# Patient Record
Sex: FEMALE | Race: WHITE | NOT HISPANIC OR LATINO | Employment: FULL TIME | URBAN - METROPOLITAN AREA
[De-identification: names, ages, dates, MRNs, and addresses within clinical notes are randomized per-mention and may not be internally consistent; named-entity substitution may affect disease eponyms.]

---

## 2017-01-09 ENCOUNTER — HOSPITAL ENCOUNTER (OUTPATIENT)
Dept: ORTHOPEDIC SURGERY | Facility: CLINIC | Age: 37
Discharge: HOME | End: 2017-01-09
Attending: ORTHOPAEDIC SURGERY | Admitting: ORTHOPAEDIC SURGERY

## 2020-03-10 ENCOUNTER — OFFICE VISIT (OUTPATIENT)
Dept: ORTHOPEDIC SURGERY | Facility: CLINIC | Age: 40
End: 2020-03-10

## 2020-03-10 ENCOUNTER — HOSPITAL ENCOUNTER (OUTPATIENT)
Dept: CT IMAGING | Facility: HOSPITAL | Age: 40
Discharge: HOME | End: 2020-03-10
Admitting: FAMILY MEDICINE

## 2020-03-10 VITALS
HEIGHT: 62 IN | DIASTOLIC BLOOD PRESSURE: 76 MMHG | SYSTOLIC BLOOD PRESSURE: 117 MMHG | BODY MASS INDEX: 25.4 KG/M2 | WEIGHT: 138 LBS | HEART RATE: 105 BPM

## 2020-03-10 DIAGNOSIS — R47.81 SLURRED SPEECH: ICD-10-CM

## 2020-03-10 DIAGNOSIS — M25.511 ACUTE PAIN OF RIGHT SHOULDER: ICD-10-CM

## 2020-03-10 DIAGNOSIS — M75.51 SUBACROMIAL BURSITIS OF RIGHT SHOULDER JOINT: ICD-10-CM

## 2020-03-10 DIAGNOSIS — M75.81 TENDINITIS OF RIGHT ROTATOR CUFF: Primary | ICD-10-CM

## 2020-03-10 PROBLEM — M25.512 PAIN IN JOINT OF LEFT SHOULDER: Status: ACTIVE | Noted: 2017-01-09

## 2020-03-10 PROBLEM — M75.52 BURSITIS OF LEFT SHOULDER: Status: ACTIVE | Noted: 2017-01-09

## 2020-03-10 PROCEDURE — 70450 CT HEAD/BRAIN W/O DYE: CPT

## 2020-03-10 PROCEDURE — 99203 OFFICE O/P NEW LOW 30 MIN: CPT | Performed by: FAMILY MEDICINE

## 2020-03-10 RX ORDER — LEVONORGESTREL AND ETHINYL ESTRADIOL 0.1-0.02MG
1 KIT ORAL DAILY
COMMUNITY
Start: 2020-02-02

## 2020-03-10 RX ORDER — IBUPROFEN 200 MG
200 TABLET ORAL EVERY 6 HOURS PRN
COMMUNITY
End: 2020-07-14 | Stop reason: HOSPADM

## 2020-03-10 NOTE — PROGRESS NOTES
"Primary Care Sports Medicine Office Visit Note     Patient ID: Rashmi Diana is a 39 y.o. female.    Chief Complaint:  Chief Complaint   Patient presents with   • Right Shoulder - Initial Evaluation     HPI:    Ms. Rashmi Diana is a 39 y.o. female who presents to the clinic today for R shoulder pain. 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 Daily chronic achy pain. Worse with activity, worse overhead, difficulty brushing hair. Has to lift R arm with the L overhead.     She also states that last night while laying in the bathtub, she felt a moderate amount pain, and arm was comfortably laying next to her. She then felt tingling and numbness into the hand and fingers, whole hand. Speaking at that time became \"jumbled\", slurred. She had a moderate HA. Blurry VA. Had recently eaten. Sitting in warm bath. No other symptoms recently, no illness.     Past Medical History:   Diagnosis Date   • Headache    • Neck pain    • Right shoulder pain        Past Surgical History:   Procedure Laterality Date   • TONSILLECTOMY AND ADENOIDECTOMY     • WISDOM TOOTH EXTRACTION         Family History   Problem Relation Age of Onset   • Cancer Mother    • Cancer Paternal Grandmother    • Diabetes Paternal Grandfather      Social History     Occupational History   • Not on file   Tobacco Use   • Smoking status: Never Smoker   • Smokeless tobacco: Never Used   Substance and Sexual Activity   • Alcohol use: Yes     Comment: Social   • Drug use: Not on file   • Sexual activity: Not on file      Review of Systems   Constitutional: Negative for activity change and fever.   Respiratory: Negative for cough and shortness of breath.    Cardiovascular: Negative for chest pain.   Gastrointestinal: Negative for constipation, diarrhea, nausea and vomiting.   Musculoskeletal: Positive for arthralgias.   Skin: Negative for color change and rash.   Neurological: " "Negative for weakness.   Hematological: Does not bruise/bleed easily.       Objective:    /76 (BP Location: Left arm, Patient Position: Sitting, Cuff Size: Adult)   Pulse 105   Ht 157.5 cm (62\")   Wt 62.6 kg (138 lb)   BMI 25.24 kg/m²     Physical Examination:  Physical Exam   Constitutional: She appears well-developed and well-nourished. No distress.   HENT:   Head: Normocephalic and atraumatic.   Eyes: Conjunctivae are normal.   Cardiovascular: Intact distal pulses.   Pulmonary/Chest: Effort normal. No respiratory distress.   Neurological: She is alert.   Skin: Skin is warm. Capillary refill takes less than 2 seconds. She is not diaphoretic.   Nursing note and vitals reviewed.    Right Shoulder Exam     Range of Motion   Active abduction: 120   Passive abduction: normal   Extension: normal   External rotation: normal   Forward flexion: 120   Internal rotation 0 degrees: normal     Muscle Strength   Abduction: 5/5   External rotation: 4/5   Supraspinatus: 4/5   Subscapularis: 5/5   Biceps: 5/5     Tests   Beck test: positive  Impingement: positive  Drop arm: negative  Sulcus: absent    Other   Erythema: absent  Sensation: normal  Pulse: present    Comments:  Mildly positive Tonja for pain, positive resisted external rotation for pain as well, negative liftoff.  Negative [place]'s, negative scarf.  Positive Neer.  Negative speeds/Yergason's.      Cervical range of motion is full with no tenderness to palpation to the bony midline or paraspinal cervical musculature.  Spurling's maneuver to the right is negative.          Imaging and other tests:  3V XR R shoulder today yields no obvious fracture, malalignment or dislocation. Essentially negative XR R shoulder.     Assessment and Plan:    1. Acute pain of right shoulder  - XR Shoulder 2+ View Right    2. Subacromial bursitis of right shoulder joint    3. Tendinitis of right rotator cuff  - Ambulatory Referral to Physical Therapy Evaluate and treat; " "Stretching, ROM, Strengthening    4. Slurred speech  - CT Head Without Contrast; Future  - Ambulatory Referral to Neurology    I discussed with this patient today that though her shoulder pain is bothering her, she has some very concerning symptoms for TIA/CVA.  Though this is unlikely in a relatively healthy 39-year-old, we will advance to emergent CT head to rule out hemorrhagic process.  The patient has recently started oral contraceptives, but she does not smoke.  She was sent directly to the hospital across the street for immediate testing.  I received a phone call at around 330 that afternoon from the radiology department, notifying me of radiologist read of negative CT head.  I then discussed this finding with the patient over the phone, who was relieved.  I would still however like for her to see a neurologist for this episode, as this sounds to be a transient ischemic attack, and needs further evaluation.    Otherwise pertaining to her shoulder, I recommended we initiate physical therapy for stretching and strengthening of the rotator cuff.  It seems that her supraspinatus is bothering her the most.  She will attempt physical therapy and return to this office status post physical therapy, in 3 months for further evaluation related to shoulder pain.    Narayan DUGGAN \"Chance\" Douglas LEDEZMA DO, CAQSM  03/11/20  17:32    Disclaimer: Please note that areas of this note were completed with computer voice recognition software.  Quite often unanticipated grammatical, syntax, homophones, and other interpretive errors are inadvertently transcribed by the computer software. Please excuse any errors that have escaped final proofreading.  "

## 2020-03-20 ENCOUNTER — OFFICE VISIT (OUTPATIENT)
Dept: ORTHOPEDIC SURGERY | Facility: CLINIC | Age: 40
End: 2020-03-20

## 2020-03-20 VITALS
SYSTOLIC BLOOD PRESSURE: 114 MMHG | HEART RATE: 103 BPM | DIASTOLIC BLOOD PRESSURE: 78 MMHG | BODY MASS INDEX: 24.45 KG/M2 | HEIGHT: 63 IN | WEIGHT: 138 LBS | TEMPERATURE: 98.6 F

## 2020-03-20 DIAGNOSIS — M75.51 SUBACROMIAL BURSITIS OF RIGHT SHOULDER JOINT: Primary | ICD-10-CM

## 2020-03-20 DIAGNOSIS — M20.011 MALLET FINGER OF RIGHT HAND: ICD-10-CM

## 2020-03-20 DIAGNOSIS — M75.81 TENDINITIS OF RIGHT ROTATOR CUFF: ICD-10-CM

## 2020-03-20 PROCEDURE — 20610 DRAIN/INJ JOINT/BURSA W/O US: CPT | Performed by: FAMILY MEDICINE

## 2020-03-20 PROCEDURE — 99214 OFFICE O/P EST MOD 30 MIN: CPT | Performed by: FAMILY MEDICINE

## 2020-03-20 RX ORDER — TRIAMCINOLONE ACETONIDE 40 MG/ML
80 INJECTION, SUSPENSION INTRA-ARTICULAR; INTRAMUSCULAR
Status: COMPLETED | OUTPATIENT
Start: 2020-03-20 | End: 2020-03-20

## 2020-03-20 RX ADMIN — TRIAMCINOLONE ACETONIDE 80 MG: 40 INJECTION, SUSPENSION INTRA-ARTICULAR; INTRAMUSCULAR at 13:26

## 2020-03-20 NOTE — PROGRESS NOTES
Procedure   Large Joint Arthrocentesis: R subacromial bursa  Date/Time: 3/20/2020 1:26 PM  Consent given by: patient  Timeout: Immediately prior to procedure a time out was called to verify the correct patient, procedure, equipment, support staff and site/side marked as required   Supporting Documentation  Indications: pain   Procedure Details  Location: shoulder - R subacromial bursa  Preparation: Patient was prepped and draped in the usual sterile fashion  Needle size: 22 G  Approach: posterior  Medications administered: 80 mg triamcinolone acetonide 40 MG/ML (6cc of 1% lidocaine without epinepherine and 2cc of 40mg Kenalog)  Patient tolerance: patient tolerated the procedure well with no immediate complications (Blood loss negligable, pt admits to almost immediate pain reflief post injection with gentle ROM.)

## 2020-03-20 NOTE — PROGRESS NOTES
Primary Care Sports Medicine Office Visit Note     Patient ID: Rashmi Diana is a 39 y.o. female.    Chief Complaint:  Chief Complaint   Patient presents with   • Right Shoulder - Follow-up, Pain     HPI:    Ms. Rashmi Diana is a 39 y.o. female who returns to the clinic today for follow-up of right shoulder pain.  The patient was previously seen and evaluated for right shoulder pain, but unfortunately had much more pressing issues at her last visit.  There was concern for TIA versus stroke, and she was sent from our office directly for CT of the brain.  This work-up was essentially negative, she returns to discuss her shoulder today.  Her shoulder pain persists, continues to wake her from sleep, points to the posterior aspect of the shoulder.  She denies any weakness, numbness, or tingling of the shoulder, but does have a moderate amount of achiness, and occasional sharp stabbing pain with overhead motion or use.    Lastly, she also states she is having difficulty after an injury of her left third finger.  She states she was lifting a bag out of the car, and jammed a straight extended finger into the back of a car seat.  This caused a forceful flexion of the DIP, that was extremely painful.  She now cannot extend this digit at the DIP joint.    Past Medical History:   Diagnosis Date   • Headache    • Neck pain    • Right shoulder pain    • Rotator cuff injury        Past Surgical History:   Procedure Laterality Date   • TONSILLECTOMY AND ADENOIDECTOMY     • WISDOM TOOTH EXTRACTION         Family History   Problem Relation Age of Onset   • Cancer Mother    • Cancer Paternal Grandmother    • Diabetes Paternal Grandfather      Social History     Occupational History   • Not on file   Tobacco Use   • Smoking status: Never Smoker   • Smokeless tobacco: Never Used   • Tobacco comment: On occassion vapes with CBD oil/ No passive exposure   Substance and Sexual Activity   • Alcohol use: Yes     Comment: Social   •  "Drug use: Never   • Sexual activity: Defer      Review of Systems   Constitutional: Negative for activity change and fever.   Respiratory: Negative for cough and shortness of breath.    Cardiovascular: Negative for chest pain.   Gastrointestinal: Negative for constipation, diarrhea, nausea and vomiting.   Musculoskeletal: Positive for arthralgias.   Skin: Negative for color change and rash.   Neurological: Negative for weakness.   Hematological: Does not bruise/bleed easily.     Objective:    /78   Pulse 103   Temp 98.6 °F (37 °C) (Oral)   Ht 160 cm (63\")   Wt 62.6 kg (138 lb)   LMP 02/25/2020   BMI 24.45 kg/m²     Physical Examination:  Physical Exam   Constitutional: She appears well-developed and well-nourished. No distress.   HENT:   Head: Normocephalic and atraumatic.   Eyes: Conjunctivae are normal.   Cardiovascular: Intact distal pulses.   Pulmonary/Chest: Effort normal. No respiratory distress.   Neurological: She is alert.   Skin: Skin is warm. Capillary refill takes less than 2 seconds. She is not diaphoretic.   Nursing note and vitals reviewed.    Left Hand Exam     Range of Motion   Wrist   Extension: normal   Flexion: normal   Pronation: normal   Supination: normal   Hand   MP Middle: normal   PIP Middle: normal   DIP Middle: normal     Muscle Strength   Wrist extension: 5/5   Wrist flexion: 5/5   :  5/5     Other   Erythema: absent  Sensation: normal    Comments:  Full passive ROM of 3rd DIP, but 0/5 strength of DIP extension      Right Shoulder Exam     Range of Motion   Active abduction: normal   Passive abduction: normal   Extension: normal   External rotation: normal   Forward flexion: normal   Internal rotation 0 degrees: normal     Muscle Strength   Abduction: 5/5   External rotation: 5/5   Supraspinatus: 5/5   Subscapularis: 5/5   Biceps: 5/5     Tests   Beck test: positive  Impingement: positive  Drop arm: negative  Sulcus: absent    Other   Erythema: absent  Sensation: " "normal  Pulse: present    Comments:  Mildly positive Tonja for pain, positive resisted external rotation for pain as well, negative liftoff.  Negative [place]'s, negative scarf.  Positive Neer.  Negative speeds/Yergason's.      Cervical range of motion is full with no tenderness to palpation to the bony midline or paraspinal cervical musculature.  Spurling's maneuver to the right is negative.            Imaging and other tests:  Three-view XR of the right hand shows no obvious fracture, malalignment, or dislocation.    Assessment and Plan:    1. Subacromial bursitis of right shoulder joint    2. Tendinitis of right rotator cuff    3. Extensor Tendon rupture, mallet finger, of right hand    After discussion of risks and benefits, the patient elected to proceed with corticosteroid injection to the R subacromial bursa.  The patient tolerated this procedure well without any complaints or problems.  I recommended continuation of conservative management as previous, RTC in 3-6 months or sooner if symptoms recur.    As far as the finger injury, the patient exhibits extensor tendon rupture.  I would like for her to be splinted in mild hyperextension for the next 6 weeks continuously.  I stressed the importance of no flexion activity at all.  Patient verbalized understanding.  She was placed in a Stax splint today.  RTC in 4 weeks.    Narayan DUGGAN \"Danilo\" Douglas LEDEZMA DO, CAQSM  03/20/20  12:29    Disclaimer: Please note that areas of this note were completed with computer voice recognition software.  Quite often unanticipated grammatical, syntax, homophones, and other interpretive errors are inadvertently transcribed by the computer software. Please excuse any errors that have escaped final proofreading.  "

## 2020-06-09 ENCOUNTER — OFFICE VISIT (OUTPATIENT)
Dept: ORTHOPEDIC SURGERY | Facility: CLINIC | Age: 40
End: 2020-06-09

## 2020-06-09 VITALS
TEMPERATURE: 98 F | HEART RATE: 88 BPM | WEIGHT: 143 LBS | SYSTOLIC BLOOD PRESSURE: 113 MMHG | DIASTOLIC BLOOD PRESSURE: 75 MMHG | BODY MASS INDEX: 25.34 KG/M2 | HEIGHT: 63 IN

## 2020-06-09 DIAGNOSIS — M75.51 SUBACROMIAL BURSITIS OF RIGHT SHOULDER JOINT: ICD-10-CM

## 2020-06-09 DIAGNOSIS — S46.009A ROTATOR CUFF INJURY, INITIAL ENCOUNTER: Primary | ICD-10-CM

## 2020-06-09 PROCEDURE — 99214 OFFICE O/P EST MOD 30 MIN: CPT | Performed by: FAMILY MEDICINE

## 2020-06-09 RX ORDER — IBUPROFEN AND FAMOTIDINE 26.6; 8 MG/1; MG/1
1 TABLET, FILM COATED ORAL 2 TIMES DAILY
Qty: 90 TABLET | Refills: 3 | Status: SHIPPED | OUTPATIENT
Start: 2020-06-09 | End: 2020-06-10 | Stop reason: SDUPTHER

## 2020-06-09 NOTE — PROGRESS NOTES
Primary Care Sports Medicine Office Visit Note     Patient ID: Rashmi Diana is a 40 y.o. female.    Chief Complaint:  Chief Complaint   Patient presents with   • Right Shoulder - Pain     HPI:    Ms. Rashmi Diana is a 40 y.o. female who presents to the clinic today for follow up of R shoulder pain. She states that initially since last visit and corticosteroid injection on 3/10/2020, she had a significant amount of relief. Unfortunately due to pandemic, PT was closed and she was unable to go. She has had return of pain, about one month ago now. Worse than before. She is again having difficultly with overhead use, combing/washing hair. Pain now radiating to th lateral and anterior arm, and down into the elbow. Taking IBU, 800mg nightly for pain, but unfortunately continues to have pain and awakens at night. Requests repeat injection.     Lastly, pt states she also had another fall since last visit. She was walking in the dark carrying a cat, and fell forward on an outstretched R arm. Pain in the shoulder significantly worsened.     Past Medical History:   Diagnosis Date   • Headache    • Neck pain    • Right shoulder pain    • Rotator cuff injury        Past Surgical History:   Procedure Laterality Date   • TONSILLECTOMY AND ADENOIDECTOMY     • WISDOM TOOTH EXTRACTION         Family History   Problem Relation Age of Onset   • Cancer Mother    • Cancer Paternal Grandmother    • Diabetes Paternal Grandfather      Social History     Occupational History   • Not on file   Tobacco Use   • Smoking status: Never Smoker   • Smokeless tobacco: Never Used   • Tobacco comment: On occassion vapes with CBD oil/ No passive exposure   Substance and Sexual Activity   • Alcohol use: Yes     Comment: Social   • Drug use: Never   • Sexual activity: Defer      Review of Systems   Constitutional: Negative for activity change and fever.   Musculoskeletal: Positive for arthralgias.   Skin: Negative for color change and rash.  "  Neurological: Negative for weakness.     Objective:    /75   Pulse 88   Temp 98 °F (36.7 °C) (Oral)   Ht 158.8 cm (62.5\")   Wt 64.9 kg (143 lb)   BMI 25.74 kg/m²     Physical Examination:  Physical Exam   Constitutional: She appears well-developed and well-nourished. No distress.   HENT:   Head: Normocephalic and atraumatic.   Eyes: Conjunctivae are normal.   Cardiovascular: Intact distal pulses.   Pulmonary/Chest: Effort normal. No respiratory distress.   Neurological: She is alert.   Skin: Skin is warm. Capillary refill takes less than 2 seconds. She is not diaphoretic.   Nursing note and vitals reviewed.    Right Shoulder Exam     Range of Motion   Active abduction:  100 abnormal   Passive abduction: normal   Extension: normal   External rotation: normal   Forward flexion:  120 abnormal   Internal rotation 0 degrees: normal     Muscle Strength   Abduction: 5/5   External rotation: 2/5   Supraspinatus: 2/5   Subscapularis: 5/5   Biceps: 5/5     Tests   Beck test: positive  Drop arm: positive  Sulcus: absent    Other   Erythema: absent  Sensation: normal  Pulse: present    Comments:  Strongly positive Tonja for weakness, positive resisted external rotation for weakness as well, negative modified liftoff.  Positive Neer.  Negative speeds/Yergason's.    Cervical range of motion is full with no tenderness to palpation to the bony midline or paraspinal cervical musculature.  Spurling's maneuver to the right is negative.            Imaging and other tests:  No new imaging today.     Assessment and Plan:    1. Subacromial bursitis of right shoulder joint    2. Rotator cuff injury, initial encounter  - Ibuprofen-Famotidine (Duexis) 800-26.6 MG tablet; Take 1 tablet by mouth 2 (Two) Times a Day for 90 days.  Dispense: 90 tablet; Refill: 3  - MRI Shoulder Right Without Contrast; Future      Status post new fall since previous evaluation, the patient is considerably weak.  With positive drop arm concern for " "full-thickness supraspinatus tear.  We will advanced MRI for further evaluation, and the patient can return to clinic in 5 to 7 days status post MRI for results discussion and treatment options at that time.  We will hold off on injection for now until further diagnostic information is obtained with MRI.    Narayan DUGGAN \"Danilo\" Douglas LEDEZMA DO, CAQSM  06/09/20  10:11    Disclaimer: Please note that areas of this note were completed with computer voice recognition software.  Quite often unanticipated grammatical, syntax, homophones, and other interpretive errors are inadvertently transcribed by the computer software. Please excuse any errors that have escaped final proofreading.  "

## 2020-06-10 DIAGNOSIS — S46.009A ROTATOR CUFF INJURY, INITIAL ENCOUNTER: ICD-10-CM

## 2020-06-10 RX ORDER — IBUPROFEN AND FAMOTIDINE 26.6; 8 MG/1; MG/1
1 TABLET, FILM COATED ORAL 2 TIMES DAILY
Qty: 90 TABLET | Refills: 3 | Status: SHIPPED | OUTPATIENT
Start: 2020-06-10 | End: 2020-07-14 | Stop reason: HOSPADM

## 2020-06-10 NOTE — TELEPHONE ENCOUNTER
Fax from pt local pharmacy regarding Duexis; need to send to speciality pharmacy.   E-scribed to [facility].

## 2020-06-19 ENCOUNTER — TELEPHONE (OUTPATIENT)
Dept: ORTHOPEDIC SURGERY | Facility: CLINIC | Age: 40
End: 2020-06-19

## 2020-06-19 NOTE — TELEPHONE ENCOUNTER
Patient called and states that she has not gotten the prescription for duexis, I let her know that it looked like you had faxed the script and that she should try and contact the pharmacy about it. I let her know if they did not get it, to call us back.

## 2020-06-22 ENCOUNTER — OFFICE VISIT (OUTPATIENT)
Dept: ORTHOPEDIC SURGERY | Facility: CLINIC | Age: 40
End: 2020-06-22

## 2020-06-22 VITALS
BODY MASS INDEX: 25.37 KG/M2 | DIASTOLIC BLOOD PRESSURE: 72 MMHG | HEART RATE: 81 BPM | HEIGHT: 63 IN | WEIGHT: 143.2 LBS | SYSTOLIC BLOOD PRESSURE: 109 MMHG

## 2020-06-22 DIAGNOSIS — S46.811A TRAUMATIC TEAR OF SUPRASPINATUS TENDON OF RIGHT SHOULDER, INITIAL ENCOUNTER: ICD-10-CM

## 2020-06-22 DIAGNOSIS — S43.431A LABRAL TEAR OF SHOULDER, RIGHT, INITIAL ENCOUNTER: Primary | ICD-10-CM

## 2020-06-22 PROCEDURE — 99214 OFFICE O/P EST MOD 30 MIN: CPT | Performed by: FAMILY MEDICINE

## 2020-06-22 NOTE — PROGRESS NOTES
"Primary Care Sports Medicine Office Visit Note     Patient ID: Rashmi Diana is a 40 y.o. female.    Chief Complaint:  Chief Complaint   Patient presents with   • Right Shoulder - Follow-up     HPI:    Ms. Rashmi Diana is a 40 y.o. female who presents to the clinic today for follow-up evaluation of right shoulder pain, and MRI results.  The patient 3 months ago had a subacromial bursa corticosteroid injection which seemed to have worked very well at that time.  She had some mild achy pain then.  Unfortunately since that visit, she had another fall with a jarring injury to her shoulder.  At last evaluation status post this fall, she was significantly weak on shoulder examination, specifically to the rotator cuff musculature.  She returns today to discuss her MRI results status post fall.    Past Medical History:   Diagnosis Date   • Headache    • Neck pain    • Right shoulder pain    • Rotator cuff injury        Past Surgical History:   Procedure Laterality Date   • TONSILLECTOMY AND ADENOIDECTOMY     • WISDOM TOOTH EXTRACTION         Family History   Problem Relation Age of Onset   • Cancer Mother    • Cancer Paternal Grandmother    • Diabetes Paternal Grandfather      Social History     Occupational History   • Not on file   Tobacco Use   • Smoking status: Never Smoker   • Smokeless tobacco: Never Used   • Tobacco comment: On occassion vapes with CBD oil/ No passive exposure   Substance and Sexual Activity   • Alcohol use: Yes     Comment: Social   • Drug use: Never   • Sexual activity: Defer      Review of Systems   Constitutional: Negative for activity change and fever.   Musculoskeletal: Positive for arthralgias.   Skin: Negative for color change and rash.   Neurological: Positive for weakness.       Objective:    /72   Pulse 81   Ht 158.8 cm (62.5\")   Wt 65 kg (143 lb 3.2 oz)   BMI 25.77 kg/m²     Physical Examination:  Physical Exam   Constitutional: She appears well-developed and " well-nourished. No distress.   HENT:   Head: Normocephalic and atraumatic.   Eyes: Conjunctivae are normal.   Cardiovascular: Intact distal pulses.   Pulmonary/Chest: Effort normal. No respiratory distress.   Neurological: She is alert.   Skin: Skin is warm. Capillary refill takes less than 2 seconds. She is not diaphoretic.   Nursing note and vitals reviewed.    Right Shoulder Exam     Range of Motion   Active abduction: 100   Passive abduction: normal   Extension: normal   External rotation: normal   Forward flexion: 100   Internal rotation 0 degrees: normal     Muscle Strength   Abduction: 5/5   External rotation: 4/5   Supraspinatus: 2/5   Subscapularis: 5/5   Biceps: 5/5     Tests   Drop arm: positive    Other   Erythema: absent  Sensation: normal  Pulse: present    Comments:  Strongly positive Tonja for weakness, positive resisted external rotation for weakness as well, negative modified liftoff.  Positive Neer.  Negative speeds/Yergason's.     Cervical range of motion is full with no tenderness to palpation to the bony midline or paraspinal cervical musculature.  Spurling's maneuver to the right is negative.            Imaging and other tests:  MRI dated 6/18/2020 yields the following IMPRESSION:   1. There is full-thickness tearing of the supraspinatus tendon without muscle atrophy. It is small in size.   2. There is a small focal full-thickness tear of the anterior infraspinatus tendon with high-grade partial-thickness tearing of the mid and posterior portion. No muscle atrophy.   3. Superior labral tear.    Assessment and Plan:    1. Labral tear of shoulder, right, initial encounter    2. Traumatic tear of supraspinatus tendon of right shoulder, initial encounter    Patient did well previous with corticosteroid injection prior to fall.  Unfortunately I feel at that time, she had a near complete disruption of supraspinatus.  She has very few fibers left on MRI.  I feel she is a good candidate for surgical  "repair.  She also has what appears to be a superior labral tear, not fully evaluated on noncontrasted MRI.  I would like for her to discuss this pathology with our shoulder surgeon, Dr. Valencia at next available visit.  I would be happy to her back showed a nonoperative course be chosen.  RTC to me PRN.    Narayan DUGGAN \"Chance\" Douglas LEDEZMA, , CAQSM  06/22/20  09:49    Disclaimer: Please note that areas of this note were completed with computer voice recognition software.  Quite often unanticipated grammatical, syntax, homophones, and other interpretive errors are inadvertently transcribed by the computer software. Please excuse any errors that have escaped final proofreading.  "

## 2020-06-29 ENCOUNTER — OFFICE VISIT (OUTPATIENT)
Dept: ORTHOPEDIC SURGERY | Facility: CLINIC | Age: 40
End: 2020-06-29

## 2020-06-29 ENCOUNTER — PREP FOR SURGERY (OUTPATIENT)
Dept: OTHER | Facility: HOSPITAL | Age: 40
End: 2020-06-29

## 2020-06-29 VITALS
SYSTOLIC BLOOD PRESSURE: 112 MMHG | BODY MASS INDEX: 25.34 KG/M2 | WEIGHT: 143 LBS | DIASTOLIC BLOOD PRESSURE: 76 MMHG | HEIGHT: 63 IN | HEART RATE: 97 BPM

## 2020-06-29 DIAGNOSIS — S46.811A TRAUMATIC TEAR OF SUPRASPINATUS TENDON OF RIGHT SHOULDER, INITIAL ENCOUNTER: Primary | ICD-10-CM

## 2020-06-29 DIAGNOSIS — S46.011D TRAUMATIC COMPLETE TEAR OF RIGHT ROTATOR CUFF, SUBSEQUENT ENCOUNTER: Primary | ICD-10-CM

## 2020-06-29 PROCEDURE — 99214 OFFICE O/P EST MOD 30 MIN: CPT | Performed by: ORTHOPAEDIC SURGERY

## 2020-06-29 RX ORDER — KETOROLAC TROMETHAMINE 15.75 MG/1
SPRAY, METERED NASAL
Qty: 1 EACH | Refills: 0 | Status: SHIPPED | OUTPATIENT
Start: 2020-06-29 | End: 2020-08-13

## 2020-06-29 NOTE — PROGRESS NOTES
"     Patient ID: Rashmi Diana is a 40 y.o. female.  Right shoulder pain  This is a 40-year-old female here with right shoulder pain since lifting an object and feeling a pop in February of this year.  She has been treated with rehab exercises, cortisone injection, rest and still has significant pain and difficulty lifting her arm.  Pain is sharp and a 6/10 and worse with lifting and at night    Review of Systems:  Right shoulder pain  Denies chest pain      Objective:    /76   Pulse 97   Ht 158.8 cm (62.5\")   Wt 64.9 kg (143 lb)   BMI 25.74 kg/m²     Physical Examination:   She is a pleasant female in no distress. She is alert and oriented x3 and appears her stated age.  Right shoulder demonstrates no scars with mild pain over the bicep groove.  Passive elevation is 160 degrees abduction 120 degrees external rotation 30 degrees internal rotation S1.  She has pain weakness on Speed, Nelson, supraspinatus testing.  Belly press and liftoff are 5/5 and 3/5.Sensory and motor exam are intact all distributions. Radial pulse is palpable and capillary refill is less than two seconds to all digits      Imaging:   X-rays demonstrate well-maintained joint spaces, MRI demonstrates full-thickness tear of the supraspinatus and infraspinatus with fluid in the biceps sheath    Assessment:    Right shoulder rotator cuff tear with possible bicep tendinitis    Plan:  Treatment options discussed, recommend right shoulder arthroscopy with rotator cuff repair and possible bicep tenodesis.Risks and benefits, specifically risks of bleeding, scar, infection, fracture, stiffness, retear, nerve, tendon, artery damage, the need for further surgery, DVT, and loss of life or limb and rehab were discussed. All questions were answered and addressed.          Disclaimer: Please note that areas of this note were completed with computer voice recognition software.  Quite often unanticipated grammatical, syntax, homophones, and other " interpretive errors are inadvertently transcribed by the computer software. Please excuse any errors that have escaped final proofreading.

## 2020-07-02 PROBLEM — S46.811A TRAUMATIC TEAR OF SUPRASPINATUS TENDON OF RIGHT SHOULDER: Status: ACTIVE | Noted: 2020-07-02

## 2020-07-11 ENCOUNTER — HOSPITAL ENCOUNTER (OUTPATIENT)
Dept: CARDIOLOGY | Facility: HOSPITAL | Age: 40
Discharge: HOME | End: 2020-07-11
Admitting: ORTHOPAEDIC SURGERY

## 2020-07-11 ENCOUNTER — LAB (OUTPATIENT)
Dept: LAB | Facility: HOSPITAL | Age: 40
End: 2020-07-11

## 2020-07-11 DIAGNOSIS — S46.811A TRAUMATIC TEAR OF SUPRASPINATUS TENDON OF RIGHT SHOULDER, INITIAL ENCOUNTER: ICD-10-CM

## 2020-07-11 LAB
ANION GAP SERPL CALCULATED.3IONS-SCNC: 12.3 MMOL/L (ref 5–15)
APTT PPP: 23.7 SECONDS (ref 24–31)
BASOPHILS # BLD AUTO: 0.01 10*3/MM3 (ref 0–0.2)
BASOPHILS NFR BLD AUTO: 0.2 % (ref 0–1.5)
BUN SERPL-MCNC: 7 MG/DL (ref 6–20)
BUN/CREAT SERPL: 8.4 (ref 7–25)
CALCIUM SPEC-SCNC: 9.4 MG/DL (ref 8.6–10.5)
CHLORIDE SERPL-SCNC: 104 MMOL/L (ref 98–107)
CO2 SERPL-SCNC: 24.7 MMOL/L (ref 22–29)
CREAT SERPL-MCNC: 0.83 MG/DL (ref 0.57–1)
DEPRECATED RDW RBC AUTO: 40 FL (ref 37–54)
EOSINOPHIL # BLD AUTO: 0.04 10*3/MM3 (ref 0–0.4)
EOSINOPHIL NFR BLD AUTO: 0.6 % (ref 0.3–6.2)
ERYTHROCYTE [DISTWIDTH] IN BLOOD BY AUTOMATED COUNT: 11.9 % (ref 12.3–15.4)
GFR SERPL CREATININE-BSD FRML MDRD: 75 ML/MIN/1.73
GLUCOSE SERPL-MCNC: 94 MG/DL (ref 65–99)
HCT VFR BLD AUTO: 38 % (ref 34–46.6)
HGB BLD-MCNC: 13.3 G/DL (ref 12–15.9)
IMM GRANULOCYTES # BLD AUTO: 0.01 10*3/MM3 (ref 0–0.05)
IMM GRANULOCYTES NFR BLD AUTO: 0.2 % (ref 0–0.5)
INR PPP: 0.92 (ref 0.9–1.1)
LYMPHOCYTES # BLD AUTO: 2.25 10*3/MM3 (ref 0.7–3.1)
LYMPHOCYTES NFR BLD AUTO: 35.9 % (ref 19.6–45.3)
MCH RBC QN AUTO: 31.9 PG (ref 26.6–33)
MCHC RBC AUTO-ENTMCNC: 35 G/DL (ref 31.5–35.7)
MCV RBC AUTO: 91.1 FL (ref 79–97)
MONOCYTES # BLD AUTO: 0.39 10*3/MM3 (ref 0.1–0.9)
MONOCYTES NFR BLD AUTO: 6.2 % (ref 5–12)
NEUTROPHILS NFR BLD AUTO: 3.56 10*3/MM3 (ref 1.7–7)
NEUTROPHILS NFR BLD AUTO: 56.9 % (ref 42.7–76)
NRBC BLD AUTO-RTO: 0 /100 WBC (ref 0–0.2)
PLATELET # BLD AUTO: 282 10*3/MM3 (ref 140–450)
PMV BLD AUTO: 10.2 FL (ref 6–12)
POTASSIUM SERPL-SCNC: 3.8 MMOL/L (ref 3.5–5.2)
PROTHROMBIN TIME: 9.8 SECONDS (ref 9.6–11.7)
RBC # BLD AUTO: 4.17 10*6/MM3 (ref 3.77–5.28)
SODIUM SERPL-SCNC: 141 MMOL/L (ref 136–145)
WBC # BLD AUTO: 6.26 10*3/MM3 (ref 3.4–10.8)

## 2020-07-11 PROCEDURE — 85610 PROTHROMBIN TIME: CPT

## 2020-07-11 PROCEDURE — 85730 THROMBOPLASTIN TIME PARTIAL: CPT

## 2020-07-11 PROCEDURE — 93005 ELECTROCARDIOGRAM TRACING: CPT | Performed by: ORTHOPAEDIC SURGERY

## 2020-07-11 PROCEDURE — U0004 COV-19 TEST NON-CDC HGH THRU: HCPCS

## 2020-07-11 PROCEDURE — C9803 HOPD COVID-19 SPEC COLLECT: HCPCS

## 2020-07-11 PROCEDURE — U0002 COVID-19 LAB TEST NON-CDC: HCPCS

## 2020-07-11 PROCEDURE — 80048 BASIC METABOLIC PNL TOTAL CA: CPT

## 2020-07-11 PROCEDURE — 85025 COMPLETE CBC W/AUTO DIFF WBC: CPT

## 2020-07-11 PROCEDURE — 36415 COLL VENOUS BLD VENIPUNCTURE: CPT

## 2020-07-13 ENCOUNTER — ANESTHESIA EVENT (OUTPATIENT)
Dept: PERIOP | Facility: HOSPITAL | Age: 40
End: 2020-07-13

## 2020-07-13 DIAGNOSIS — S46.811A TRAUMATIC TEAR OF SUPRASPINATUS TENDON OF RIGHT SHOULDER, INITIAL ENCOUNTER: Primary | ICD-10-CM

## 2020-07-13 LAB
REF LAB TEST METHOD: NORMAL
SARS-COV-2 RNA RESP QL NAA+PROBE: NOT DETECTED

## 2020-07-13 PROCEDURE — 93010 ELECTROCARDIOGRAM REPORT: CPT | Performed by: INTERNAL MEDICINE

## 2020-07-13 RX ORDER — NAPROXEN 500 MG/1
500 TABLET ORAL 2 TIMES DAILY WITH MEALS
Qty: 28 TABLET | Refills: 0 | Status: SHIPPED | OUTPATIENT
Start: 2020-07-13 | End: 2020-08-13

## 2020-07-13 RX ORDER — CEPHALEXIN 500 MG/1
500 CAPSULE ORAL 4 TIMES DAILY
Qty: 4 CAPSULE | Refills: 0 | Status: SHIPPED | OUTPATIENT
Start: 2020-07-13 | End: 2020-07-14

## 2020-07-13 RX ORDER — PROMETHAZINE HYDROCHLORIDE 25 MG/1
25 TABLET ORAL EVERY 6 HOURS PRN
Qty: 21 TABLET | Refills: 1 | Status: SHIPPED | OUTPATIENT
Start: 2020-07-13 | End: 2020-08-13

## 2020-07-13 RX ORDER — OXYCODONE AND ACETAMINOPHEN 10; 325 MG/1; MG/1
1 TABLET ORAL EVERY 6 HOURS PRN
Qty: 28 TABLET | Refills: 0 | Status: SHIPPED | OUTPATIENT
Start: 2020-07-13 | End: 2020-08-13

## 2020-07-14 ENCOUNTER — HOSPITAL ENCOUNTER (OUTPATIENT)
Facility: HOSPITAL | Age: 40
Setting detail: HOSPITAL OUTPATIENT SURGERY
Discharge: HOME | End: 2020-07-14
Attending: ORTHOPAEDIC SURGERY | Admitting: ORTHOPAEDIC SURGERY

## 2020-07-14 ENCOUNTER — ANESTHESIA (OUTPATIENT)
Dept: PERIOP | Facility: HOSPITAL | Age: 40
End: 2020-07-14

## 2020-07-14 VITALS
HEIGHT: 63 IN | SYSTOLIC BLOOD PRESSURE: 115 MMHG | WEIGHT: 143 LBS | TEMPERATURE: 97.9 F | HEART RATE: 110 BPM | DIASTOLIC BLOOD PRESSURE: 69 MMHG | RESPIRATION RATE: 16 BRPM | BODY MASS INDEX: 25.34 KG/M2 | OXYGEN SATURATION: 99 %

## 2020-07-14 DIAGNOSIS — S46.811A TRAUMATIC TEAR OF SUPRASPINATUS TENDON OF RIGHT SHOULDER, INITIAL ENCOUNTER: ICD-10-CM

## 2020-07-14 LAB — B-HCG UR QL: NEGATIVE

## 2020-07-14 PROCEDURE — C1713 ANCHOR/SCREW BN/BN,TIS/BN: HCPCS | Performed by: ORTHOPAEDIC SURGERY

## 2020-07-14 PROCEDURE — 25010000002 ROPIVACAINE PER 1 MG: Performed by: ANESTHESIOLOGY

## 2020-07-14 PROCEDURE — 81025 URINE PREGNANCY TEST: CPT | Performed by: ORTHOPAEDIC SURGERY

## 2020-07-14 PROCEDURE — 25010000002 DEXAMETHASONE PER 1 MG: Performed by: ANESTHESIOLOGY

## 2020-07-14 PROCEDURE — 25010000002 KETOROLAC TROMETHAMINE PER 15 MG: Performed by: NURSE ANESTHETIST, CERTIFIED REGISTERED

## 2020-07-14 PROCEDURE — 25010000002 EPINEPHRINE PER 0.1 MG: Performed by: ORTHOPAEDIC SURGERY

## 2020-07-14 PROCEDURE — 25010000003 LIDOCAINE 1 % SOLUTION 50 ML VIAL: Performed by: ORTHOPAEDIC SURGERY

## 2020-07-14 PROCEDURE — 25010000002 MIDAZOLAM PER 1 MG: Performed by: ANESTHESIOLOGY

## 2020-07-14 PROCEDURE — 25010000002 KETOROLAC TROMETHAMINE PER 15 MG: Performed by: ORTHOPAEDIC SURGERY

## 2020-07-14 PROCEDURE — 25010000003 LIDOCAINE 1 % SOLUTION 20 ML VIAL: Performed by: ORTHOPAEDIC SURGERY

## 2020-07-14 PROCEDURE — 25010000002 DEXAMETHASONE PER 1 MG: Performed by: NURSE ANESTHETIST, CERTIFIED REGISTERED

## 2020-07-14 PROCEDURE — 25010000002 PROPOFOL 10 MG/ML EMULSION: Performed by: NURSE ANESTHETIST, CERTIFIED REGISTERED

## 2020-07-14 PROCEDURE — 25010000002 ONDANSETRON PER 1 MG: Performed by: NURSE ANESTHETIST, CERTIFIED REGISTERED

## 2020-07-14 PROCEDURE — 25010000002 FENTANYL CITRATE (PF) 100 MCG/2ML SOLUTION: Performed by: ANESTHESIOLOGY

## 2020-07-14 PROCEDURE — 25010000003 MEPERIDINE PER 100 MG: Performed by: NURSE ANESTHETIST, CERTIFIED REGISTERED

## 2020-07-14 PROCEDURE — 29827 SHO ARTHRS SRG RT8TR CUF RPR: CPT | Performed by: ORTHOPAEDIC SURGERY

## 2020-07-14 DEVICE — SUT/ANCH BIOCOMP SWIVELOCK/C 4.75X19.1MM: Type: IMPLANTABLE DEVICE | Site: SHOULDER | Status: FUNCTIONAL

## 2020-07-14 DEVICE — SUT/ANCH BIOCOMP SWIVELOCK/C TIGR TP LP WHT: Type: IMPLANTABLE DEVICE | Site: SHOULDER | Status: FUNCTIONAL

## 2020-07-14 DEVICE — SUT/ANCH BIOCOMP SWIVELOCK/C BLU FIBR TP LP: Type: IMPLANTABLE DEVICE | Site: SHOULDER | Status: FUNCTIONAL

## 2020-07-14 DEVICE — SUT FIBERLINK W/SUT TP 1.3MM WHT/BLU: Type: IMPLANTABLE DEVICE | Site: SHOULDER | Status: FUNCTIONAL

## 2020-07-14 RX ORDER — ONDANSETRON 2 MG/ML
INJECTION INTRAMUSCULAR; INTRAVENOUS AS NEEDED
Status: DISCONTINUED | OUTPATIENT
Start: 2020-07-14 | End: 2020-07-14 | Stop reason: SURG

## 2020-07-14 RX ORDER — FENTANYL CITRATE 50 UG/ML
INJECTION, SOLUTION INTRAMUSCULAR; INTRAVENOUS
Status: COMPLETED | OUTPATIENT
Start: 2020-07-14 | End: 2020-07-14

## 2020-07-14 RX ORDER — SODIUM CHLORIDE 0.9 % (FLUSH) 0.9 %
10 SYRINGE (ML) INJECTION EVERY 12 HOURS SCHEDULED
Status: DISCONTINUED | OUTPATIENT
Start: 2020-07-14 | End: 2020-07-14 | Stop reason: HOSPADM

## 2020-07-14 RX ORDER — FENTANYL CITRATE 50 UG/ML
25 INJECTION, SOLUTION INTRAMUSCULAR; INTRAVENOUS
Status: DISCONTINUED | OUTPATIENT
Start: 2020-07-14 | End: 2020-07-14 | Stop reason: HOSPADM

## 2020-07-14 RX ORDER — ONDANSETRON 2 MG/ML
4 INJECTION INTRAMUSCULAR; INTRAVENOUS ONCE AS NEEDED
Status: DISCONTINUED | OUTPATIENT
Start: 2020-07-14 | End: 2020-07-14 | Stop reason: HOSPADM

## 2020-07-14 RX ORDER — KETOROLAC TROMETHAMINE 30 MG/ML
INJECTION, SOLUTION INTRAMUSCULAR; INTRAVENOUS AS NEEDED
Status: DISCONTINUED | OUTPATIENT
Start: 2020-07-14 | End: 2020-07-14 | Stop reason: SURG

## 2020-07-14 RX ORDER — MIDAZOLAM HYDROCHLORIDE 1 MG/ML
INJECTION INTRAMUSCULAR; INTRAVENOUS
Status: COMPLETED | OUTPATIENT
Start: 2020-07-14 | End: 2020-07-14

## 2020-07-14 RX ORDER — DEXAMETHASONE SODIUM PHOSPHATE 4 MG/ML
INJECTION, SOLUTION INTRA-ARTICULAR; INTRALESIONAL; INTRAMUSCULAR; INTRAVENOUS; SOFT TISSUE AS NEEDED
Status: DISCONTINUED | OUTPATIENT
Start: 2020-07-14 | End: 2020-07-14 | Stop reason: SURG

## 2020-07-14 RX ORDER — ROPIVACAINE HYDROCHLORIDE 5 MG/ML
INJECTION, SOLUTION EPIDURAL; INFILTRATION; PERINEURAL
Status: COMPLETED | OUTPATIENT
Start: 2020-07-14 | End: 2020-07-14

## 2020-07-14 RX ORDER — MEPERIDINE HYDROCHLORIDE 25 MG/ML
12.5 INJECTION INTRAMUSCULAR; INTRAVENOUS; SUBCUTANEOUS
Status: DISCONTINUED | OUTPATIENT
Start: 2020-07-14 | End: 2020-07-14 | Stop reason: HOSPADM

## 2020-07-14 RX ORDER — SODIUM CHLORIDE 0.9 % (FLUSH) 0.9 %
10 SYRINGE (ML) INJECTION AS NEEDED
Status: DISCONTINUED | OUTPATIENT
Start: 2020-07-14 | End: 2020-07-14 | Stop reason: HOSPADM

## 2020-07-14 RX ORDER — LIDOCAINE HYDROCHLORIDE 10 MG/ML
INJECTION, SOLUTION EPIDURAL; INFILTRATION; INTRACAUDAL; PERINEURAL AS NEEDED
Status: DISCONTINUED | OUTPATIENT
Start: 2020-07-14 | End: 2020-07-14 | Stop reason: SURG

## 2020-07-14 RX ORDER — SODIUM CHLORIDE, SODIUM LACTATE, POTASSIUM CHLORIDE, CALCIUM CHLORIDE 600; 310; 30; 20 MG/100ML; MG/100ML; MG/100ML; MG/100ML
9 INJECTION, SOLUTION INTRAVENOUS CONTINUOUS PRN
Status: DISCONTINUED | OUTPATIENT
Start: 2020-07-14 | End: 2020-07-14 | Stop reason: HOSPADM

## 2020-07-14 RX ORDER — PHENYLEPHRINE HCL IN 0.9% NACL 0.5 MG/5ML
SYRINGE (ML) INTRAVENOUS AS NEEDED
Status: DISCONTINUED | OUTPATIENT
Start: 2020-07-14 | End: 2020-07-14 | Stop reason: SURG

## 2020-07-14 RX ORDER — ESMOLOL HYDROCHLORIDE 10 MG/ML
INJECTION INTRAVENOUS AS NEEDED
Status: DISCONTINUED | OUTPATIENT
Start: 2020-07-14 | End: 2020-07-14 | Stop reason: SURG

## 2020-07-14 RX ORDER — DEXAMETHASONE SODIUM PHOSPHATE 4 MG/ML
INJECTION, SOLUTION INTRA-ARTICULAR; INTRALESIONAL; INTRAMUSCULAR; INTRAVENOUS; SOFT TISSUE
Status: COMPLETED | OUTPATIENT
Start: 2020-07-14 | End: 2020-07-14

## 2020-07-14 RX ORDER — KETAMINE HYDROCHLORIDE 10 MG/ML
INJECTION INTRAMUSCULAR; INTRAVENOUS AS NEEDED
Status: DISCONTINUED | OUTPATIENT
Start: 2020-07-14 | End: 2020-07-14 | Stop reason: SURG

## 2020-07-14 RX ORDER — PROPOFOL 10 MG/ML
VIAL (ML) INTRAVENOUS AS NEEDED
Status: DISCONTINUED | OUTPATIENT
Start: 2020-07-14 | End: 2020-07-14 | Stop reason: SURG

## 2020-07-14 RX ORDER — HYDROMORPHONE HCL 110MG/55ML
0.25 PATIENT CONTROLLED ANALGESIA SYRINGE INTRAVENOUS
Status: DISCONTINUED | OUTPATIENT
Start: 2020-07-14 | End: 2020-07-14 | Stop reason: HOSPADM

## 2020-07-14 RX ADMIN — LIDOCAINE HYDROCHLORIDE 100 MG: 10 INJECTION, SOLUTION EPIDURAL; INFILTRATION; INTRACAUDAL; PERINEURAL at 11:20

## 2020-07-14 RX ADMIN — MIDAZOLAM 2 MG: 1 INJECTION INTRAMUSCULAR; INTRAVENOUS at 09:53

## 2020-07-14 RX ADMIN — FENTANYL CITRATE 100 MCG: 50 INJECTION, SOLUTION INTRAMUSCULAR; INTRAVENOUS at 09:53

## 2020-07-14 RX ADMIN — ONDANSETRON 4 MG: 2 INJECTION INTRAMUSCULAR; INTRAVENOUS at 12:31

## 2020-07-14 RX ADMIN — DEXAMETHASONE SODIUM PHOSPHATE 6 MG: 4 INJECTION, SOLUTION INTRAMUSCULAR; INTRAVENOUS at 11:19

## 2020-07-14 RX ADMIN — KETAMINE HYDROCHLORIDE 10 MG: 10 INJECTION INTRAMUSCULAR; INTRAVENOUS at 12:00

## 2020-07-14 RX ADMIN — CEFAZOLIN SODIUM 2 G: 1 INJECTION, POWDER, FOR SOLUTION INTRAMUSCULAR; INTRAVENOUS at 11:24

## 2020-07-14 RX ADMIN — PHENYLEPHRINE HYDROCHLORIDE 100 MCG: 10 INJECTION INTRAVENOUS at 12:23

## 2020-07-14 RX ADMIN — FENTANYL CITRATE 50 MCG: 50 INJECTION, SOLUTION INTRAMUSCULAR; INTRAVENOUS at 11:22

## 2020-07-14 RX ADMIN — PHENYLEPHRINE HYDROCHLORIDE 100 MCG: 10 INJECTION INTRAVENOUS at 11:57

## 2020-07-14 RX ADMIN — KETAMINE HYDROCHLORIDE 15 MG: 10 INJECTION INTRAMUSCULAR; INTRAVENOUS at 12:31

## 2020-07-14 RX ADMIN — MEPERIDINE HYDROCHLORIDE 12.5 MG: 25 INJECTION INTRAMUSCULAR; INTRAVENOUS; SUBCUTANEOUS at 13:25

## 2020-07-14 RX ADMIN — PHENYLEPHRINE HYDROCHLORIDE 100 MCG: 10 INJECTION INTRAVENOUS at 12:11

## 2020-07-14 RX ADMIN — PROPOFOL 150 MG: 10 INJECTION, EMULSION INTRAVENOUS at 11:25

## 2020-07-14 RX ADMIN — PHENYLEPHRINE HYDROCHLORIDE 100 MCG: 10 INJECTION INTRAVENOUS at 11:50

## 2020-07-14 RX ADMIN — PHENYLEPHRINE HYDROCHLORIDE 100 MCG: 10 INJECTION INTRAVENOUS at 12:18

## 2020-07-14 RX ADMIN — ROPIVACAINE HYDROCHLORIDE 20 ML: 5 INJECTION, SOLUTION EPIDURAL; INFILTRATION; PERINEURAL at 09:53

## 2020-07-14 RX ADMIN — KETOROLAC TROMETHAMINE 30 MG: 30 INJECTION, SOLUTION INTRAMUSCULAR at 12:31

## 2020-07-14 RX ADMIN — KETAMINE HYDROCHLORIDE 25 MG: 10 INJECTION INTRAMUSCULAR; INTRAVENOUS at 11:16

## 2020-07-14 RX ADMIN — ESMOLOL HYDROCHLORIDE 20 MG: 10 INJECTION, SOLUTION INTRAVENOUS at 11:22

## 2020-07-14 RX ADMIN — DEXAMETHASONE SODIUM PHOSPHATE 4 MG: 4 INJECTION, SOLUTION INTRAMUSCULAR; INTRAVENOUS at 09:53

## 2020-07-14 RX ADMIN — SODIUM CHLORIDE, SODIUM LACTATE, POTASSIUM CHLORIDE, AND CALCIUM CHLORIDE: .6; .31; .03; .02 INJECTION, SOLUTION INTRAVENOUS at 11:11

## 2020-07-14 RX ADMIN — ESMOLOL HYDROCHLORIDE 10 MG: 10 INJECTION, SOLUTION INTRAVENOUS at 11:25

## 2020-07-14 NOTE — ANESTHESIA PROCEDURE NOTES
Airway  Date/Time: 7/14/2020 11:15 AM  Airway not difficult    General Information and Staff    Patient location during procedure: OR  Anesthesiologist: Katy Freitas MD  CRNA: Jayda Villalba CRNA    Indications and Patient Condition  Indications for airway management: airway protection    Preoxygenated: yes  MILS maintained throughout  Mask difficulty assessment: 0 - not attempted    Final Airway Details  Final airway type: supraglottic airway      Successful airway: unique  Size 4    Number of attempts at approach: 1  Assessment: lips, teeth, and gum same as pre-op and atraumatic intubation

## 2020-07-14 NOTE — ANESTHESIA POSTPROCEDURE EVALUATION
Patient: Rashmi Diana    Procedure Summary     Date:  07/14/20 Room / Location:  [facility] OR  / [facility] MAIN OR    Anesthesia Start:  1111 Anesthesia Stop:  1250    Procedure:  SHOULDER ARTHROSCOPY WITH ROTATOR CUFF REPAIR (Right Shoulder) Diagnosis:       Traumatic tear of supraspinatus tendon of right shoulder, initial encounter      (Traumatic tear of supraspinatus tendon of right shoulder, initial encounter [S46.811A])    Surgeon:  Dylan Valencia MD Provider:  Katy Freitas MD    Anesthesia Type:  general with block ASA Status:  2          Anesthesia Type: general with block    Vitals  Vitals Value Taken Time   /69 7/14/2020  1:39 PM   Temp 97.6 °F (36.4 °C) 7/14/2020 12:52 PM   Pulse 110 7/14/2020  1:39 PM   Resp 16 7/14/2020  1:37 PM   SpO2 95 % 7/14/2020  1:39 PM   Vitals shown include unvalidated device data.        Post Anesthesia Care and Evaluation    Patient location during evaluation: PACU  Patient participation: complete - patient participated  Level of consciousness: awake  Pain management: adequate  Airway patency: patent  Anesthetic complications: No anesthetic complications  PONV Status: controlled  Cardiovascular status: acceptable  Respiratory status: acceptable  Hydration status: acceptable

## 2020-07-14 NOTE — OP NOTE
SHOULDER ARTHROSCOPY WITH ROTATOR CUFF REPAIR  Procedure Report    Patient Name:  Rashmi Diana  YOB: 1980    Date of Surgery:  7/14/2020     Indications: This is a 40 y.o. female with pain to the right shoulder.  Imaging demonstrated rotator cuff tear.Treatment options were discussed.  They desired to proceed with shoulder arthroscopy with rotator cuff repair after discussing the risks including bleeding, scarring, infection, stiffness, nerve damage, tendon damage, artery damage, continued pain, DVT, loss of life or limb, and a need for further surgery.      Pre-op Diagnosis:   Traumatic tear of supraspinatus tendon of right shoulder, initial encounter [S46.811A]       Post-Op Diagnosis Codes:     * Traumatic tear of supraspinatus tendon of right shoulder, initial encounter [S46.811A]    Procedure/CPT® Codes: 23564    Procedure(s):  SHOULDER ARTHROSCOPY WITH ROTATOR CUFF REPAIR    Assistant: Sonny Mejia certified first assist    was responsible for performing the following activities: Retraction, Suction, Irrigation, Suturing, Closing and Placing Dressing and their skilled assistance was necessary for the success of this case.         Anesthesia: General with Block    IV fluids: See anesthesia record    Estimated Blood Loss: minimal    Implants:    Implant Name Type Inv. Item Serial No.  Lot No. LRB No. Used   SUT/ANCH BIOCOMP SWIVELOCK/C NANCI FIBR TP LP - [id number] Implant SUT/ANCH BIOCOMP SWIVELOCK/C NANCI FIBR TP LP  [employer] [id number] Right 1   SUT FIBERLINK W/SUT TP 1.3MM WHT/NANCI - [id number] Implant SUT FIBERLINK W/SUT TP 1.3MM WHT/NANCI  [id number] . Right 3   SUT/ANCH BIOCOMP SWIVELOCK/C TIGR TP LP WHT - [id number] Implant SUT/ANCH BIOCOMP SWIVELOCK/C TIGR TP LP T  [employer] [id number] Right 1   SUT/ANCH BIOCOMP SWIVELOCK/C 4.75X19.1MM - [id number] Implant SUT/ANCH BIOCOMP SWIVELOCK/C 4.75X19.1MM  [employer] [id number] Right 1   SUT/ANCH BIOCOMP SWIVELOCK/C 4.75X19.1MM - [id number] Implant  SUT/ANCH BIOCOMP SWIVELOCK/C 4.75X19.1MM  [employer] [id number] Right 1         Complications: None    Description of Procedure: The patient's operative site was marked.  Regional anesthesia was administered.  They were brought to the operating room and placed  on the operating room table.  General anesthesia was administered. Antibiotics were dosed.  A timeout was taken, confirming the correct operative site and procedure.  Exam under anesthesia indicated full range of motion and no instability.  They were placed in a semilateral position.  An axillary roll and SCDs were placed.  The right shoulder was prepped and draped in the standard surgical fashion.  The shoulder was injected with local anesthetic and was placed into traction.  A posterior portal was created.  A camera was inserted.  The glenoid chondral surface was intact.  The humerus surface was intact.  The subscapularis was intact.  The biceps was intact.  The labrum was intact with a [place] complex anteriorly.  The undersurface of the cuff demonstrated full-thickness supraspinatus tear. A shaver was inserted.  The labrum probed and intact.  The biceps was pulled into the shoulder and found to be intact.  The axillary pouch was free of synovitis or loose bodies. The instruments were placed in the subacromial space.  A full-thickness bursectomy was performed.  The CA ligament was intact.  No impingement was noted.  An accessory portal was created. Tear was visualized and prepared.  It was a 3 cm delaminated mildly retracted supraspinatus tear.  The tuberosity was skeletonized to bleeding bone.  A microfracture was performed and the split was abraded with a rasp.  Two medial row anchors were placed as well as 2 FiberLink sutures which were passed through the tendon split, crisscrossed, and secured to lateral row of fixation, restoring the tendon to its footprint.  The instruments were removed.  The wounds were closed with suture and Steri-Strips.    30 mg of  Toradol and lidocaine were injected into the deltoid  and a sterile dressing was applied to the rest of the shoulder.  They were placed in a sling and taken to the recovery room.  There were no complications.  I was present for all portions.  All counts were correct.  Good capillary refill was noted to the hand.      Dylan Valencia MD     Date: 7/14/2020  Time: 12:34

## 2020-07-14 NOTE — ANESTHESIA PREPROCEDURE EVALUATION
Anesthesia Evaluation     Patient summary reviewed and Nursing notes reviewed   no history of anesthetic complications:  NPO Solid Status: > 8 hours  NPO Liquid Status: > 8 hours           Airway   Mallampati: I  TM distance: >3 FB  Neck ROM: full  No difficulty expected  Dental      Pulmonary - negative pulmonary ROS    breath sounds clear to auscultation  Cardiovascular - negative cardio ROS    ECG reviewed  Rhythm: regular  Rate: normal        Neuro/Psych  (+) headaches, psychiatric history Anxiety and Depression,     GI/Hepatic/Renal/Endo - negative ROS     Musculoskeletal     (+) neck pain,   Abdominal     Abdomen: soft.   Substance History - negative use     OB/GYN negative ob/gyn ROS         Other - negative ROS                       Anesthesia Plan    ASA 2     general with block     intravenous induction     Anesthetic plan, all risks, benefits, and alternatives have been provided, discussed and informed consent has been obtained with: patient.    Plan discussed with CAA.

## 2020-07-14 NOTE — ANESTHESIA PROCEDURE NOTES
Peripheral Block    Pre-sedation assessment completed: 7/14/2020 9:35 AM    Patient reassessed immediately prior to procedure    Patient location during procedure: pre-op  Start time: 7/14/2020 9:40 AM  Stop time: 7/14/2020 9:45 AM  Reason for block: at surgeon's request and post-op pain management  Performed by  Anesthesiologist: Katy Freitas MD  Assisted by: Den Allison RN  Preanesthetic Checklist  Completed: patient identified, site marked, surgical consent, pre-op evaluation, timeout performed, IV checked, risks and benefits discussed and monitors and equipment checked  Prep:  Pt Position: sitting  Sterile barriers:cap, gloves and sterile barriers  Prep: ChloraPrep  Patient monitoring: blood pressure monitoring, continuous pulse oximetry and EKG  Procedure  Sedation:yes  Performed under: local infiltration  Guidance:ultrasound guided  ULTRASOUND INTERPRETATION.  Using ultrasound guidance a 21 G gauge needle was placed in close proximity to the brachial plexus nerve, at which point, under ultrasound guidance anesthetic was injected in the area of the nerve and spread of the anesthesia was seen on ultrasound in close proximity thereto.  There were no abnormalities seen on ultrasound; a digital image was taken; and the patient tolerated the procedure with no complications. Images:still images obtained, printed/placed on chart    Laterality:right  Block Type:interscalene  Injection Technique:single-shot  Needle Type:echogenic  Needle Gauge:21 G  Resistance on Injection: none  Sedation medications used: midazolam (VERSED) injection, 2 mg  fentaNYL citrate (PF) (SUBLIMAZE) injection, 100 mcg  Medications Used: dexamethasone (DECADRON) injection, 4 mg  ropivacaine (NAROPIN) 0.5 % injection, 20 mL      Post Assessment  Injection Assessment: negative aspiration for heme, no paresthesia on injection and incremental injection  Patient Tolerance:comfortable throughout block  Complications:no

## 2020-07-16 ENCOUNTER — OFFICE VISIT (OUTPATIENT)
Dept: ORTHOPEDIC SURGERY | Facility: CLINIC | Age: 40
End: 2020-07-16

## 2020-07-16 VITALS
BODY MASS INDEX: 25.34 KG/M2 | DIASTOLIC BLOOD PRESSURE: 83 MMHG | HEIGHT: 63 IN | SYSTOLIC BLOOD PRESSURE: 131 MMHG | HEART RATE: 88 BPM | WEIGHT: 143 LBS

## 2020-07-16 DIAGNOSIS — Z47.89 ORTHOPEDIC AFTERCARE: Primary | ICD-10-CM

## 2020-07-16 DIAGNOSIS — S46.011D TRAUMATIC COMPLETE TEAR OF RIGHT ROTATOR CUFF, SUBSEQUENT ENCOUNTER: ICD-10-CM

## 2020-07-16 PROCEDURE — 99024 POSTOP FOLLOW-UP VISIT: CPT | Performed by: ORTHOPAEDIC SURGERY

## 2020-07-16 NOTE — PROGRESS NOTES
Patient ID: Rashmi Diana is a 40 y.o. female.  7/14/20 right shoulder arthroscopy with supraspinatus repair  Pain mild    Objective:    LMP 06/16/2020 (Approximate)     Physical Examination:  Wounds are well approximated without infection.  Sensory and motor exam are intact all distributions. Radial pulse is palpable and capillary refill is less than two seconds to all digits      Imaging:      Assessment:  Doing well after cuff repair    Plan:  Wounds were cleaned and redressed. Restrictions discussed.  Begin therapy and see me in 4 weeks.  Remove dressings in two weeks

## 2020-07-16 NOTE — PATIENT INSTRUCTIONS
Shoulder Arthroscopy: Post- Operative Visit Objectives    1) Review the operative findings, procedures and photos.  2) Make sure medications are effective and not causing problems.  a) Pain: Oxycodone or hydrocodone is for pain. You may take 1 tablet every 6 hours as necessary.  Some patients don’t require the use of these…in those circumstances just use Tylenol extra-strength 1 or 2 tablets every 4-6 hours.   b) Naproxen 500 mg. For pain and inflammation.  You should take 1 every twice a day.  Do not use Aleve, Ibuprofen, Motrin or Advil during this time.  If you have had any problems stop taking these medicines and please tell us!  c) Keflex (cephalexin). This is an antibiotic to be taken as a preventive medicine.  Take 1 pill every 6 hours for 1 day. If you have a penicillin allergy this will be replaced by other options.  3) Wound Care:  a) Today we will change your dressings and cover your wounds with a plastic covering called Tegaderm. This will allow you to shower immediately.  Remove the tegaderm and underlying dressings in two weeks then ok to get wet in a shower. No Baths or swimming until 4 weeks after surgery.  Keep a towel on the dressing while applying ice.  4) Exercises and Physical Therapy Remember the protocol is 3 phases:  a) Healing (6 wks)  Continue the use of the sling for 6 weeks; depending on procedure utilized this may be shorter. You can do gentle range of motion exercise of the elbow and wrist immediately with the arm at the side.  Formal physical therapy will usually start in two weeks.    b) Rehabilitative (6 wks) You begin a more aggressive phase of physical therapy at the 6 week angel. Light strengthening and a continued emphasis on protecting the repair are important at this stage.  c) Restorative (4 wks)  Back to your sports and job activities gradually   5) Follow Up appointments Schedule Follow up visits as directed usually in 4 weeks. Physical therapy will be ordered today and you  should receive a phone call or can schedule yourself if appropriate.  6) Notes  Make sure you have all necessary notes and documentation for school or work.  7) Issues: Remember our goal is to make this process smooth and easy if there is any thing you need please ask us or call back [phone]  8)

## 2020-07-29 ENCOUNTER — TREATMENT (OUTPATIENT)
Dept: PHYSICAL THERAPY | Facility: CLINIC | Age: 40
End: 2020-07-29

## 2020-07-29 DIAGNOSIS — S46.011D TRAUMATIC TEAR OF RIGHT ROTATOR CUFF, SUBSEQUENT ENCOUNTER: Primary | ICD-10-CM

## 2020-07-29 DIAGNOSIS — M25.511 ACUTE PAIN OF RIGHT SHOULDER: ICD-10-CM

## 2020-07-29 PROCEDURE — 97140 MANUAL THERAPY 1/> REGIONS: CPT | Performed by: PHYSICAL THERAPIST

## 2020-07-29 PROCEDURE — 97014 ELECTRIC STIMULATION THERAPY: CPT | Performed by: PHYSICAL THERAPIST

## 2020-07-29 PROCEDURE — 97161 PT EVAL LOW COMPLEX 20 MIN: CPT | Performed by: PHYSICAL THERAPIST

## 2020-07-29 NOTE — PROGRESS NOTES
Physical Therapy Initial Evaluation and Plan of Care    Patient: Rashmi Diana   : 1980  Diagnosis/ICD-10 Code:  Traumatic tear of right rotator cuff, subsequent encounter [S46.011D]  Referring practitioner: Dylan Valencia, *  Date of Initial Visit: 2020  Today's Date: 2020  Patient seen for 1 sessions           Subjective Questionnaire: QuickDASH: 82% impairment      Subjective Evaluation    History of Present Illness  Date of surgery: 2020  Mechanism of injury: Pt underwent R RCR. 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 Pt received shot in 3/2020 which helped. Then sustained fall in 2020 and further injured shoulder. Stopped taking pain meds 5 days post-op due to wanting to drive. Taking Naproxen 2x/day and tylenol prn. Using ice almost every night. Having pain in R UT, R bicep, and lateral upper arm. Difficulty sleeping. Has been working from home, rearranged desk for comfort and transitioned to stand up desk.      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   Precautions and Work Restrictions: follow protocolQuality of life: excellent    Pain  Current pain ratin  At best pain ratin  At worst pain ratin  Location: R shoulder  Quality: dull ache and radiating  Relieving factors: ice and medications  Aggravating factors: keyboarding, movement, prolonged positioning and sleeping    Social Support  Lives with: spouse    Hand dominance: right    Patient Goals  Patient goals for therapy: decreased edema, decreased pain, increased motion, increased strength, independence with ADLs/IADLs, return to sport/leisure activities and return to work             Objective          Postural Observations  Seated posture: fair    Additional Postural Observation Details  Rounded shoulders, B scap abducted (R>L). Sling and abduction pillow in place.    Palpation     Right   Muscle spasm in the biceps. Tenderness of the biceps,  levator scapulae and upper trapezius.     Cervical/Thoracic Screen   Cervical range of motion within normal limits with the following exceptions: Mild tightness R UT    Active Range of Motion   Left Shoulder   Normal active range of motion    Additional Active Range of Motion Details  R elbow AROM WFL with mild tightness in bicep.    Passive Range of Motion     Right Shoulder   Flexion: 85 degrees with pain  Abduction: 70 degrees with pain  External rotation 45°: 5 degrees with pain  Internal rotation 45°: 30 degrees with pain    Scapular Mobility   Left Shoulder   Scapular mobility: WFL    Right Shoulder   Scapular mobility: fair    Strength/Myotome Testing     Left Shoulder   Normal muscle strength          Assessment & Plan     Assessment  Impairments: abnormal muscle firing, abnormal or restricted ROM, activity intolerance, impaired physical strength, lacks appropriate home exercise program and pain with function  Assessment details: Pt is 39 yo female s/p R RCR. Pt presents with decreased strength and ROM of R shoulder. Pt with limited use of R UE per protocol. Pt is having difficulty with all ADLS. Difficulty working at computer. Difficulty sleeping. Quick DASH indicates 82% impairment.    Patient presents with the impairments listed above and based on the objective findings and the physical therapy evaluation, the patient’s condition has the potential to improve in response to therapy.   The patient’s condition and/or services required are at a level of complexity that necessitates the skill & supervision of a physical therapist.    Prognosis: good  Functional Limitations: carrying objects, lifting, sleeping, pulling, pushing, uncomfortable because of pain, moving in bed, reaching behind back and reaching overhead  Goals  Plan Goals: STG:  - Increase R shoulder flex PROM to 120 deg and Ext Rt to 45 deg in 2 weeks.  - Pt to demonstrate improved posture with min verbal cues in 2 weeks.  - Pt to report 50%  improvement in pain in 3 weeks.  LTG:  - Improve Quick DASH to 20% or less impairment by discharge.  - Increase R shoulder flex strength to 4-/5 for improved functional use of by discharge.  - Pt to perform all home and work tasks with max pain of 2/10 by discharge.  - Pt to be independent with HEP by discharge.    Plan  Therapy options: will be seen for skilled physical therapy services  Planned modality interventions: cryotherapy, TENS and thermotherapy (hydrocollator packs)  Other planned modality interventions: modalities as indicated  Planned therapy interventions: body mechanics training, flexibility, functional ROM exercises, home exercise program, joint mobilization, postural training, soft tissue mobilization, spinal/joint mobilization, strengthening, stretching and therapeutic activities  Frequency: 2x week  Duration in visits: 20  Treatment plan discussed with: patient        Timed:         Manual Therapy:    20     mins  66067;     Therapeutic Exercise:    5     mins  41912;     Neuromuscular Mallorie:        mins  60062;    Therapeutic Activity:          mins  58259;     Gait Training:           mins  67180;     Ultrasound:          mins  53753;    Ionto                                   mins   48265  Can Repos          mins 06042    Un-Timed:  Electrical Stimulation:    15     mins  82386 ( );  Dry Needling          mins self-pay  Traction          mins 42627  Low Eval     20     Mins  16953  Mod Eval          Mins  17974  High Eval                            Mins  38410  Self - Care                          mins  38484        Timed Treatment:   25   mins   Total Treatment:     60   mins    PT SIGNATURE: Minnie Kathleen, PT   DATE TREATMENT INITIATED: 7/29/2020    Initial Certification  Certification Period: 10/27/2020  I certify that the therapy services are furnished while this patient is under my care.  The services outlined above are required by this patient, and will be reviewed every 90  days.     PHYSICIAN: Dylan Valencia MD  _________________________________________________________________    DATE:     Please sign and return via fax to  .. Thank you, [facility] Physical Therapy.

## 2020-07-31 ENCOUNTER — TREATMENT (OUTPATIENT)
Dept: PHYSICAL THERAPY | Facility: CLINIC | Age: 40
End: 2020-07-31

## 2020-07-31 DIAGNOSIS — M25.511 ACUTE PAIN OF RIGHT SHOULDER: ICD-10-CM

## 2020-07-31 DIAGNOSIS — S46.011D TRAUMATIC TEAR OF RIGHT ROTATOR CUFF, SUBSEQUENT ENCOUNTER: Primary | ICD-10-CM

## 2020-07-31 PROCEDURE — 97140 MANUAL THERAPY 1/> REGIONS: CPT | Performed by: PHYSICAL THERAPIST

## 2020-07-31 PROCEDURE — 97014 ELECTRIC STIMULATION THERAPY: CPT | Performed by: PHYSICAL THERAPIST

## 2020-07-31 PROCEDURE — 97110 THERAPEUTIC EXERCISES: CPT | Performed by: PHYSICAL THERAPIST

## 2020-07-31 NOTE — PROGRESS NOTES
Physical Therapy Daily Progress Note        Patient: Rashmi Diana   : 1980  Diagnosis/ICD-10 Code:  Traumatic tear of right rotator cuff, subsequent encounter [S46.011D]  Referring practitioner: Dylan Valencia, *  Date of Initial Visit: Type: THERAPY  Noted: 2020  Today's Date: 2020  Patient seen for 2 sessions         Rashmi Diana reports: feeling continued tightness in biceps and UT. Reports good response to initial visit.       Subjective     Objective Passive flexion to 100 degrees   See Exercise, Manual, and Modality Logs for complete treatment.       Assessment/Plan  Patient demonstrated improved passive flexion today following manual techniques. Patient required mild verbal cues to decrease guarding. Patient provided proper return demonstration with reviewed HEP.   Progress per Plan of Care           Manual Therapy:    20     mins  03313;  Therapeutic Exercise:    10     mins  39573;     Neuromuscular Mallorie:        mins  47243;    Therapeutic Activity:          mins  91127;     Gait Training:           mins  34288;     Ultrasound:          mins  28157;    Electrical Stimulation:    15     mins  21590 ( );  Dry Needling          mins self-pay    Timed Treatment:   30   mins   Total Treatment:     45   mins    Bulmaro Monique PTA  Physical Therapist

## 2020-08-05 ENCOUNTER — TREATMENT (OUTPATIENT)
Dept: PHYSICAL THERAPY | Facility: CLINIC | Age: 40
End: 2020-08-05

## 2020-08-05 DIAGNOSIS — M25.511 ACUTE PAIN OF RIGHT SHOULDER: ICD-10-CM

## 2020-08-05 DIAGNOSIS — S46.011D TRAUMATIC TEAR OF RIGHT ROTATOR CUFF, SUBSEQUENT ENCOUNTER: Primary | ICD-10-CM

## 2020-08-05 PROCEDURE — 97110 THERAPEUTIC EXERCISES: CPT | Performed by: PHYSICAL THERAPIST

## 2020-08-05 PROCEDURE — 97014 ELECTRIC STIMULATION THERAPY: CPT | Performed by: PHYSICAL THERAPIST

## 2020-08-05 PROCEDURE — 97140 MANUAL THERAPY 1/> REGIONS: CPT | Performed by: PHYSICAL THERAPIST

## 2020-08-05 NOTE — PROGRESS NOTES
Physical Therapy Daily Progress Note        Patient: Rashmi Diana   : 1980  Diagnosis/ICD-10 Code:  Traumatic tear of right rotator cuff, subsequent encounter [S46.011D]  Referring practitioner: Dylan Valencia, *  Date of Initial Visit: Type: THERAPY  Noted: 2020  Today's Date: 2020  Patient seen for 3 sessions         Rashmi Diana reports: feeling good following last visit, has no questions with HEP.       Subjective     Objective Added wrist flex/ext, supination/pronation 2#   See Exercise, Manual, and Modality Logs for complete treatment.        Assessment/Plan Patient tolerated progressed therapeutic exercise well with no reports of increased symptoms. Patient provided proper return demonstration with added HEP. Patient maintaining good passive motion flexion to 95 degrees today.     Progress per Plan of Care           Manual Therapy:    20     mins  70000;  Therapeutic Exercise:    10     mins  54805;     Neuromuscular Mallorie:        mins  43789;    Therapeutic Activity:          mins  11151;     Gait Training:           mins  95887;     Ultrasound:          mins  33730;    Electrical Stimulation:    15     mins  80216 ( );  Dry Needling          mins self-pay    Timed Treatment:   30   mins   Total Treatment:     45   mins    Bulmaro Monique PTA  Physical Therapist

## 2020-08-07 ENCOUNTER — TREATMENT (OUTPATIENT)
Dept: PHYSICAL THERAPY | Facility: CLINIC | Age: 40
End: 2020-08-07

## 2020-08-07 DIAGNOSIS — M25.511 ACUTE PAIN OF RIGHT SHOULDER: ICD-10-CM

## 2020-08-07 DIAGNOSIS — S46.011D TRAUMATIC TEAR OF RIGHT ROTATOR CUFF, SUBSEQUENT ENCOUNTER: Primary | ICD-10-CM

## 2020-08-07 PROCEDURE — 97014 ELECTRIC STIMULATION THERAPY: CPT | Performed by: PHYSICAL THERAPIST

## 2020-08-07 PROCEDURE — 97110 THERAPEUTIC EXERCISES: CPT | Performed by: PHYSICAL THERAPIST

## 2020-08-07 PROCEDURE — 97140 MANUAL THERAPY 1/> REGIONS: CPT | Performed by: PHYSICAL THERAPIST

## 2020-08-07 NOTE — PROGRESS NOTES
Physical Therapy Daily Progress Note        Patient: Rashmi Diana   : 1980  Diagnosis/ICD-10 Code:  Traumatic tear of right rotator cuff, subsequent encounter [S46.011D]  Referring practitioner: Dylan Valencia, *  Date of Initial Visit: Type: THERAPY  Noted: 2020  Today's Date: 2020  Patient seen for 4 sessions         Rashmi Diana reports: having some continued tenderness in right shoulder with occasional spasms.       Subjective     Objective dowel contreras shoulder flexion,   See Exercise, Manual, and Modality Logs for complete treatment.       Assessment/Plan Patient demonstrated improved passive flexion to 105 degrees with decreased verbal cues to relax. Patient demonstrating improved scapular awareness while performing passive ROM.     Progress per Plan of Care           Manual Therapy:    20     mins  58013;  Therapeutic Exercise:    10     mins  17406;     Neuromuscular Mallorie:        mins  92465;    Therapeutic Activity:          mins  57769;     Gait Training:           mins  47132;     Ultrasound:          mins  81518;    Electrical Stimulation:    15     mins  80815 ( );  Dry Needling          mins self-pay    Timed Treatment:   30   mins   Total Treatment:     45   mins    Bulmaro Monique PTA  Physical Therapist

## 2020-08-10 ENCOUNTER — TREATMENT (OUTPATIENT)
Dept: PHYSICAL THERAPY | Facility: CLINIC | Age: 40
End: 2020-08-10

## 2020-08-10 DIAGNOSIS — S46.011D TRAUMATIC TEAR OF RIGHT ROTATOR CUFF, SUBSEQUENT ENCOUNTER: Primary | ICD-10-CM

## 2020-08-10 DIAGNOSIS — M25.511 ACUTE PAIN OF RIGHT SHOULDER: ICD-10-CM

## 2020-08-10 PROCEDURE — 97140 MANUAL THERAPY 1/> REGIONS: CPT | Performed by: PHYSICAL THERAPIST

## 2020-08-10 PROCEDURE — 97110 THERAPEUTIC EXERCISES: CPT | Performed by: PHYSICAL THERAPIST

## 2020-08-10 PROCEDURE — 97014 ELECTRIC STIMULATION THERAPY: CPT | Performed by: PHYSICAL THERAPIST

## 2020-08-10 NOTE — PROGRESS NOTES
Physical Therapy Daily Progress Note      Patient: Rashmi Diana   : 1980  Diagnosis/ICD-10 Code:  Traumatic tear of right rotator cuff, subsequent encounter [S46.011D]  Referring practitioner: Dylan Valencia, *  Date of Initial Visit: Type: THERAPY  Noted: 2020  Today's Date: 8/10/2020  Patient seen for 5 sessions         Rashmi Diana reports: she is able to get arm higher with use of pulleys and has been working hard on it. Reports having pain in tricep. Sees surgeon on Thurs, 2020. States using ice at end of session seems to help a lot.      Objective : Added supine ext rot PROM with use of dowel contreras to ther ex and HEP. Pt demonstrated good understanding. Issued copy of exercises. Guarded during supine PROM flex and abd.   See Exercise, Manual, and Modality Logs for complete treatment.       Assessment/Plan : Flex PROM improving with use of over the door pulleys. Needs continued ROM work.     Progress per Plan of Care per protocol.           Timed:         Manual Therapy:    20     mins  99451;     Therapeutic Exercise:    10     mins  78420;     Neuromuscular Mallorie:        mins  55442;    Therapeutic Activity:          mins  14245;     Gait Training:           mins  69271;     Ultrasound:          mins  51090;    Ionto                                   mins   83602  Self Care                            mins   16616  Canalith Repos                   mins  4209    Un-Timed:  Electrical Stimulation:    15     mins  27695 ( );  Dry Needling          mins self-pay  Traction          mins 33398  Low Eval          Mins  14255  Mod Eval          Mins  15321  High Eval                            Mins  33538    Timed Treatment:   30   mins   Total Treatment:     45   mins    Minnie Kathleen, PT  Physical Therapist

## 2020-08-12 ENCOUNTER — TREATMENT (OUTPATIENT)
Dept: PHYSICAL THERAPY | Facility: CLINIC | Age: 40
End: 2020-08-12

## 2020-08-12 DIAGNOSIS — S46.011D TRAUMATIC TEAR OF RIGHT ROTATOR CUFF, SUBSEQUENT ENCOUNTER: Primary | ICD-10-CM

## 2020-08-12 DIAGNOSIS — M25.511 ACUTE PAIN OF RIGHT SHOULDER: ICD-10-CM

## 2020-08-12 PROCEDURE — 97140 MANUAL THERAPY 1/> REGIONS: CPT | Performed by: PHYSICAL THERAPIST

## 2020-08-12 PROCEDURE — 97014 ELECTRIC STIMULATION THERAPY: CPT | Performed by: PHYSICAL THERAPIST

## 2020-08-12 PROCEDURE — 97110 THERAPEUTIC EXERCISES: CPT | Performed by: PHYSICAL THERAPIST

## 2020-08-12 NOTE — PROGRESS NOTES
Physical Therapy Daily Progress Note        Patient: Rashmi Diana   : 1980  Diagnosis/ICD-10 Code:  Traumatic tear of right rotator cuff, subsequent encounter [S46.011D]  Referring practitioner: Dylan Valencia, *  Date of Initial Visit: Type: THERAPY  Noted: 2020  Today's Date: 2020  Patient seen for 6 sessions         Rashmi Diana reports: feeling better and better every week, noticing improved passive motion and is pleased with progress.       Subjective     Objective   See Exercise, Manual, and Modality Logs for complete treatment.       Assessment/Plan Patient requires mild verbal cues for decreased guarding and with patient responding well and demonstrating improved passive flexion with over the door pulleys. Patient needs continued improvements with ROM at this time.     Progress per Plan of Care           Manual Therapy:    20     mins  91548;  Therapeutic Exercise:    15     mins  96302;     Neuromuscular Mallorie:        mins  84949;    Therapeutic Activity:          mins  41552;     Gait Training:           mins  95156;     Ultrasound:          mins  09083;    Electrical Stimulation:    15     mins  46850 ( );  Dry Needling          mins self-pay    Timed Treatment:   35   mins   Total Treatment:     50   mins    Bulmaro Monique PTA  Physical Therapist

## 2020-08-13 ENCOUNTER — OFFICE VISIT (OUTPATIENT)
Dept: ORTHOPEDIC SURGERY | Facility: CLINIC | Age: 40
End: 2020-08-13

## 2020-08-13 VITALS
BODY MASS INDEX: 25.34 KG/M2 | DIASTOLIC BLOOD PRESSURE: 77 MMHG | HEIGHT: 63 IN | SYSTOLIC BLOOD PRESSURE: 112 MMHG | HEART RATE: 91 BPM | WEIGHT: 143 LBS

## 2020-08-13 DIAGNOSIS — Z47.89 ORTHOPEDIC AFTERCARE: Primary | ICD-10-CM

## 2020-08-13 PROCEDURE — 99024 POSTOP FOLLOW-UP VISIT: CPT | Performed by: ORTHOPAEDIC SURGERY

## 2020-08-13 NOTE — PROGRESS NOTES
Patient ID: Rashmi Diana is a 40 y.o. female.    7/14/20 right shoulder arthroscopy with supraspinatus repair  Pain mild    Objective:    There were no vitals taken for this visit.    Physical Examination:  Incisions are healed, passive elevation 90 degrees, external rotation 5 degrees      Imaging:      Assessment:  Mild stiffness after cuff repair    Plan:  Restrictions discussed. Continue physical therapy. See me in 8 weeks. Sling for 2 weeks

## 2020-08-17 ENCOUNTER — TREATMENT (OUTPATIENT)
Dept: PHYSICAL THERAPY | Facility: CLINIC | Age: 40
End: 2020-08-17

## 2020-08-17 DIAGNOSIS — M25.511 ACUTE PAIN OF RIGHT SHOULDER: ICD-10-CM

## 2020-08-17 DIAGNOSIS — S46.011D TRAUMATIC TEAR OF RIGHT ROTATOR CUFF, SUBSEQUENT ENCOUNTER: Primary | ICD-10-CM

## 2020-08-17 PROCEDURE — 97014 ELECTRIC STIMULATION THERAPY: CPT | Performed by: PHYSICAL THERAPIST

## 2020-08-17 PROCEDURE — 97110 THERAPEUTIC EXERCISES: CPT | Performed by: PHYSICAL THERAPIST

## 2020-08-17 PROCEDURE — 97140 MANUAL THERAPY 1/> REGIONS: CPT | Performed by: PHYSICAL THERAPIST

## 2020-08-17 NOTE — PROGRESS NOTES
Physical Therapy Daily Progress Note      Patient: Rashmi Diana   : 1980  Diagnosis/ICD-10 Code:  Traumatic tear of right rotator cuff, subsequent encounter [S46.011D]  Referring practitioner: Dylan Valencia, *  Date of Initial Visit: Type: THERAPY  Noted: 2020  Today's Date: 2020  Patient seen for 7 sessions         Rashmi Diana reports: she has not done any of her exercises and today and shoulder is tight. States she is so ready to get rid of her sling. (6 wk post-op = 2020)    Objective : Added bent over rows to neutral. Provided cues for technique and to avoid overactivation of UT. Educated pt on importance of doing HEP and strongly encouraged to use over the door pulleys if she is short on time and can only do a couple exercises. Pt verbalized understanding.  See Exercise, Manual, and Modality Logs for complete treatment.       Assessment/Plan : Increased tightness at start of session, improved with PROM. Cues provided for proper technique during exercises. Pt needs continued scap base strengthening.     Progress per Plan of Care per protocol.           Timed:         Manual Therapy:    20     mins  50873;     Therapeutic Exercise:    15     mins  42495;     Neuromuscular Mallorie:        mins  88582;    Therapeutic Activity:          mins  94401;     Gait Training:           mins  18156;     Ultrasound:          mins  72979;    Ionto                                   mins   00876  Self Care                            mins   87932  Canalith Repos                   mins  4209    Un-Timed:  Electrical Stimulation:    15     mins  88707 ( );  Dry Needling          mins self-pay  Traction          mins 72577  Low Eval          Mins  14136  Mod Eval          Mins  27962  High Eval                            Mins  16485    Timed Treatment:   35   mins   Total Treatment:     60   mins    Minnie Kathleen, PT  Physical Therapist

## 2020-08-19 ENCOUNTER — TREATMENT (OUTPATIENT)
Dept: PHYSICAL THERAPY | Facility: CLINIC | Age: 40
End: 2020-08-19

## 2020-08-19 DIAGNOSIS — S46.011D TRAUMATIC TEAR OF RIGHT ROTATOR CUFF, SUBSEQUENT ENCOUNTER: Primary | ICD-10-CM

## 2020-08-19 DIAGNOSIS — M25.511 ACUTE PAIN OF RIGHT SHOULDER: ICD-10-CM

## 2020-08-19 PROCEDURE — 97140 MANUAL THERAPY 1/> REGIONS: CPT | Performed by: PHYSICAL THERAPIST

## 2020-08-19 PROCEDURE — 97110 THERAPEUTIC EXERCISES: CPT | Performed by: PHYSICAL THERAPIST

## 2020-08-19 PROCEDURE — 97014 ELECTRIC STIMULATION THERAPY: CPT | Performed by: PHYSICAL THERAPIST

## 2020-08-19 NOTE — PROGRESS NOTES
Physical Therapy Daily Progress Note        Patient: Rashmi Diana   : 1980  Diagnosis/ICD-10 Code:  Traumatic tear of right rotator cuff, subsequent encounter [S46.011D]  Referring practitioner: Dylan Valencia, *  Date of Initial Visit: Type: THERAPY  Noted: 2020  Today's Date: 2020  Patient seen for 8 sessions         Rashmi Diana reports: shoulder feeling ok, with continued tightness noted.       Subjective     Objective Required mild verbal cues for improved scapular awareness with patient providing proper return demonstration.   See Exercise, Manual, and Modality Logs for complete treatment.       Assessment/Plan Patient demonstrated improved passive motion following verbal cues and is gradually making progress towards goals at this time.     Progress per Plan of Care           Manual Therapy:    20     mins  59656;  Therapeutic Exercise:    15     mins  46563;     Neuromuscular Mallorie:        mins  02495;    Therapeutic Activity:          mins  25847;     Gait Training:           mins  15864;     Ultrasound:          mins  58169;    Electrical Stimulation:    15     mins  56099 ( );  Dry Needling          mins self-pay    Timed Treatment:   35   mins   Total Treatment:     60   mins    Bulmaro Monique PTA  Physical Therapist

## 2020-08-24 ENCOUNTER — TREATMENT (OUTPATIENT)
Dept: PHYSICAL THERAPY | Facility: CLINIC | Age: 40
End: 2020-08-24

## 2020-08-24 DIAGNOSIS — S46.011D TRAUMATIC TEAR OF RIGHT ROTATOR CUFF, SUBSEQUENT ENCOUNTER: Primary | ICD-10-CM

## 2020-08-24 DIAGNOSIS — M25.511 ACUTE PAIN OF RIGHT SHOULDER: ICD-10-CM

## 2020-08-24 PROCEDURE — 97014 ELECTRIC STIMULATION THERAPY: CPT | Performed by: PHYSICAL THERAPIST

## 2020-08-24 PROCEDURE — 97140 MANUAL THERAPY 1/> REGIONS: CPT | Performed by: PHYSICAL THERAPIST

## 2020-08-24 PROCEDURE — 97110 THERAPEUTIC EXERCISES: CPT | Performed by: PHYSICAL THERAPIST

## 2020-08-24 NOTE — PROGRESS NOTES
Physical Therapy Daily Progress Note      Patient: Rashmi Diana   : 1980  Diagnosis/ICD-10 Code:  Traumatic tear of right rotator cuff, subsequent encounter [S46.011D]  Referring practitioner: Dylan Valencia, *  Date of Initial Visit: Type: THERAPY  Noted: 2020  Today's Date: 2020  Patient seen for 9 sessions         Rashmi Diana reports: she stopped wearing her brace today and shoulder feels more achy. States she is having elbow and bicep pain.      Objective : Added strap for teres stretch during over the door pulleys.  See Exercise, Manual, and Modality Logs for complete treatment.       Assessment/Plan : Supination and pronation mobility improved post-mobs. Needs continued shoulder ROM.    Progress per Plan of Care per protocol.           Timed:         Manual Therapy:    20     mins  46921;     Therapeutic Exercise:    15     mins  89154;     Neuromuscular Mallorie:        mins  36267;    Therapeutic Activity:          mins  83131;     Gait Training:           mins  82989;     Ultrasound:          mins  12313;    Ionto                                   mins   09901  Self Care                            mins   44431  Canalith Repos                   mins  4209    Un-Timed:  Electrical Stimulation:    15     mins  22300 ( );  Dry Needling          mins self-pay  Traction          mins 45118  Low Eval          Mins  47570  Mod Eval          Mins  78868  High Eval                            Mins  10344    Timed Treatment:   35   mins   Total Treatment:     50   mins    Minnie Kathleen, PT  Physical Therapist

## 2020-08-26 ENCOUNTER — TREATMENT (OUTPATIENT)
Dept: PHYSICAL THERAPY | Facility: CLINIC | Age: 40
End: 2020-08-26

## 2020-08-26 DIAGNOSIS — S46.011D TRAUMATIC TEAR OF RIGHT ROTATOR CUFF, SUBSEQUENT ENCOUNTER: Primary | ICD-10-CM

## 2020-08-26 DIAGNOSIS — M25.511 ACUTE PAIN OF RIGHT SHOULDER: ICD-10-CM

## 2020-08-26 PROCEDURE — 97014 ELECTRIC STIMULATION THERAPY: CPT | Performed by: PHYSICAL THERAPIST

## 2020-08-26 PROCEDURE — 97140 MANUAL THERAPY 1/> REGIONS: CPT | Performed by: PHYSICAL THERAPIST

## 2020-08-26 PROCEDURE — 97110 THERAPEUTIC EXERCISES: CPT | Performed by: PHYSICAL THERAPIST

## 2020-08-26 NOTE — PROGRESS NOTES
Physical Therapy Daily Progress Note        Patient: Rashmi Diana   : 1980  Diagnosis/ICD-10 Code:  Traumatic tear of right rotator cuff, subsequent encounter [S46.011D]  Referring practitioner: Dylan Valencia, *  Date of Initial Visit: Type: THERAPY  Noted: 2020  Today's Date: 2020  Patient seen for 10 sessions         Rashmi Diana reports: feeling ok today, does have continued soreness in elbow from time to time.       Subjective     Objective  Added scaption wall slides with therapist resistance  See Exercise, Manual, and Modality Logs for complete treatment.       Assessment/Plan Patient tolerated progressed therapeutic exercise well with no reports of increased symptoms. Patient displayed continued teres minor tightness with decreased scapular upward rotation causing increased impingement symptoms with shoulder flexion.     Progress per Plan of Care           Manual Therapy:    20     mins  20741;  Therapeutic Exercise:    15     mins  66644;     Neuromuscular Mallorie:        mins  71070;    Therapeutic Activity:          mins  24722;     Gait Training:           mins  54534;     Ultrasound:          mins  60257;    Electrical Stimulation:    15     mins  28563 ( );  Dry Needling          mins self-pay    Timed Treatment:   35   mins   Total Treatment:     50   mins    Bulmaro Monique PTA  Physical Therapist

## 2020-08-31 ENCOUNTER — TREATMENT (OUTPATIENT)
Dept: PHYSICAL THERAPY | Facility: CLINIC | Age: 40
End: 2020-08-31

## 2020-08-31 DIAGNOSIS — M25.511 ACUTE PAIN OF RIGHT SHOULDER: ICD-10-CM

## 2020-08-31 DIAGNOSIS — S46.011D TRAUMATIC TEAR OF RIGHT ROTATOR CUFF, SUBSEQUENT ENCOUNTER: Primary | ICD-10-CM

## 2020-08-31 PROCEDURE — 97014 ELECTRIC STIMULATION THERAPY: CPT | Performed by: PHYSICAL THERAPIST

## 2020-08-31 PROCEDURE — 97140 MANUAL THERAPY 1/> REGIONS: CPT | Performed by: PHYSICAL THERAPIST

## 2020-08-31 PROCEDURE — 97110 THERAPEUTIC EXERCISES: CPT | Performed by: PHYSICAL THERAPIST

## 2020-08-31 NOTE — PROGRESS NOTES
Physical Therapy Daily Progress Note      Patient: Rashmi Diana   : 1980  Diagnosis/ICD-10 Code:  Traumatic tear of right rotator cuff, subsequent encounter [S46.011D]  Referring practitioner: Dylan Valencia, *  Date of Initial Visit: Type: THERAPY  Noted: 2020  Today's Date: 2020  Patient seen for 11 sessions         Rashmi Diana reports: her shoulder feels very stiff. Wants to try dry needling. 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 At end of session, pt stated she felt like dry needling helped and wants to try doing it 1x/wk.      Objective : Dry needling with written consent. R lat, teres, and subscap with manual palpation and deep tissue activity. MHP applied to this area after TDN prior to additional stretching. Improved flex ROM noted post-tx and pt reported improved comfort with flexion.  See Exercise, Manual, and Modality Logs for complete treatment.       Assessment/Plan : Continued tightness at lateral scap impairing mobility and biomechanics. Good initial response to dry needling. Needs continued scap base strengthening and further shoulder ROM.    Progress per Plan of Care. Progress ther ex per protocol as tolerated.           Timed:         Manual Therapy:    30     mins  74612;     Therapeutic Exercise:    15     mins  56549;     Neuromuscular Mallorie:        mins  94869;    Therapeutic Activity:          mins  71268;     Gait Training:           mins  77309;     Ultrasound:          mins  55701;    Ionto                                   mins   86067  Self Care                            mins   56873  Canalith Repos                   mins  4209    Un-Timed:  Electrical Stimulation:    15     mins  45054 ( );  Dry Needling          mins self-pay  Traction          mins 67247  Low Eval          Mins  18546  Mod Eval          Mins  12430  High Eval                            Mins  50297    Timed Treatment:    45   mins   Total Treatment:     70   mins    Minnie Kathleen, PT  Physical Therapist

## 2020-09-02 ENCOUNTER — TREATMENT (OUTPATIENT)
Dept: PHYSICAL THERAPY | Facility: CLINIC | Age: 40
End: 2020-09-02

## 2020-09-02 DIAGNOSIS — M75.52 BURSITIS OF LEFT SHOULDER: ICD-10-CM

## 2020-09-02 DIAGNOSIS — M25.512 PAIN IN JOINT OF LEFT SHOULDER: ICD-10-CM

## 2020-09-02 DIAGNOSIS — S46.811D TRAUMATIC TEAR OF SUPRASPINATUS TENDON OF RIGHT SHOULDER, SUBSEQUENT ENCOUNTER: Primary | ICD-10-CM

## 2020-09-02 PROCEDURE — 97110 THERAPEUTIC EXERCISES: CPT | Performed by: PHYSICAL THERAPIST

## 2020-09-02 PROCEDURE — 97014 ELECTRIC STIMULATION THERAPY: CPT | Performed by: PHYSICAL THERAPIST

## 2020-09-02 PROCEDURE — 97140 MANUAL THERAPY 1/> REGIONS: CPT | Performed by: PHYSICAL THERAPIST

## 2020-09-02 NOTE — PROGRESS NOTES
Physical Therapy Daily Progress Note      Patient: Rashmi Diana   : 1980  Diagnosis/ICD-10 Code:  Traumatic tear of supraspinatus tendon of right shoulder, subsequent encounter [S46.811D]  Referring practitioner: Dylan Valencia, *  Date of Initial Visit: Type: THERAPY  Noted: 2020  Today's Date: 2020  Patient seen for 12 sessions         Rashmi Diana reports: having good response to dry needling last visit noticing decreased irritation with passive motion.       Objective :  Improved flex ROM noted post-tx and pt reported improved comfort with flexion.  See Exercise, Manual, and Modality Logs for complete treatment.       Assessment/Plan : Continued tightness at lateral scap impairing mobility and biomechanics.  Needs continued scap base strengthening and further shoulder ROM.    Progress per Plan of Care..           Timed:         Manual Therapy:    20     mins  38006;     Therapeutic Exercise:    15     mins  74520;     Neuromuscular Mallorie:        mins  23610;    Therapeutic Activity:          mins  14083;     Gait Training:           mins  17733;     Ultrasound:          mins  05593;    Ionto                                   mins   74978  Self Care                            mins   00007  Canalith Repos                   mins  4209    Un-Timed:  Electrical Stimulation:    15     mins  13086 ( );  Dry Needling          mins self-pay  Traction          mins 22997  Low Eval          Mins  64449  Mod Eval          Mins  29082  High Eval                            Mins  56570    Timed Treatment:   35   mins   Total Treatment:     60   mins    Bulmaro Monique PTA  Physical Therapist

## 2020-09-09 ENCOUNTER — TREATMENT (OUTPATIENT)
Dept: PHYSICAL THERAPY | Facility: CLINIC | Age: 40
End: 2020-09-09

## 2020-09-09 DIAGNOSIS — M75.52 BURSITIS OF LEFT SHOULDER: ICD-10-CM

## 2020-09-09 DIAGNOSIS — M25.512 PAIN IN JOINT OF LEFT SHOULDER: ICD-10-CM

## 2020-09-09 DIAGNOSIS — S46.811D TRAUMATIC TEAR OF SUPRASPINATUS TENDON OF RIGHT SHOULDER, SUBSEQUENT ENCOUNTER: Primary | ICD-10-CM

## 2020-09-09 PROCEDURE — 97014 ELECTRIC STIMULATION THERAPY: CPT | Performed by: PHYSICAL THERAPIST

## 2020-09-09 PROCEDURE — 97110 THERAPEUTIC EXERCISES: CPT | Performed by: PHYSICAL THERAPIST

## 2020-09-09 PROCEDURE — 97140 MANUAL THERAPY 1/> REGIONS: CPT | Performed by: PHYSICAL THERAPIST

## 2020-09-09 NOTE — PROGRESS NOTES
Physical Therapy Daily Progress Note      Patient: Rashmi Diana   : 1980  Diagnosis/ICD-10 Code:  Traumatic tear of supraspinatus tendon of right shoulder, subsequent encounter [S46.811D]   Problem List Items Addressed This Visit        Nervous and Auditory    Pain in joint of left shoulder       Musculoskeletal and Integument    Bursitis of left shoulder    Traumatic tear of supraspinatus tendon of right shoulder - Primary         Referring practitioner: Dylan Valencia, *  Date of Initial Visit: Type: THERAPY  Noted: 2020  Today's Date: 2020    VISIT#: 13    Subjective Patient reports feeling increased tightness over the weekend and would like to do dry needling again.       Objective TDN R lat, teres, UT, subscap performed by Kelli HOOD, contract relax with over the door pulleys and supine passive motion.     See Exercise, Manual, and Modality Logs for complete treatment.     Assessment/Plan Patient demonstrated improved flexion and scapular upward rotation following dry needling and manual interventions with improved flexion. Patient will continue to benefit from improved passive motion delaying base scapular strengthening at this time.     Progress per Plan of Care         Timed:         Manual Therapy:    20     mins  32435;     Therapeutic Exercise:    15     mins  49412;     Neuromuscular Mallorie:        mins  65200;    Therapeutic Activity:          mins  08138;     Gait Training:           mins  85896;     Ultrasound:          mins  74422;    Ionto                                   mins   46611  Canalith Repos                   mins  17279    Un-Timed:  Electrical Stimulation:    15     mins  95793 ( );  Dry Needling     10     mins self-pay  Traction          mins 78536    Timed Treatment:   45   mins   Total Treatment:     70   mins    Bulmaro Monique PTA  Physical Therapist

## 2020-09-11 ENCOUNTER — TREATMENT (OUTPATIENT)
Dept: PHYSICAL THERAPY | Facility: CLINIC | Age: 40
End: 2020-09-11

## 2020-09-11 DIAGNOSIS — M75.52 BURSITIS OF LEFT SHOULDER: ICD-10-CM

## 2020-09-11 DIAGNOSIS — S46.811D TRAUMATIC TEAR OF SUPRASPINATUS TENDON OF RIGHT SHOULDER, SUBSEQUENT ENCOUNTER: Primary | ICD-10-CM

## 2020-09-11 DIAGNOSIS — M25.512 PAIN IN JOINT OF LEFT SHOULDER: ICD-10-CM

## 2020-09-11 PROCEDURE — 97110 THERAPEUTIC EXERCISES: CPT | Performed by: PHYSICAL THERAPIST

## 2020-09-11 PROCEDURE — 97140 MANUAL THERAPY 1/> REGIONS: CPT | Performed by: PHYSICAL THERAPIST

## 2020-09-11 PROCEDURE — 97014 ELECTRIC STIMULATION THERAPY: CPT | Performed by: PHYSICAL THERAPIST

## 2020-09-11 NOTE — PROGRESS NOTES
Physical Therapy Daily Progress Note      Patient: Rashmi Diana   : 1980  Diagnosis/ICD-10 Code:  Traumatic tear of supraspinatus tendon of right shoulder, subsequent encounter [S46.811D]   Problem List Items Addressed This Visit        Nervous and Auditory    Pain in joint of left shoulder       Musculoskeletal and Integument    Bursitis of left shoulder    Traumatic tear of supraspinatus tendon of right shoulder - Primary         Referring practitioner: Dylan Valencia, *  Date of Initial Visit: Type: THERAPY  Noted: 2020  Today's Date: 2020    VISIT#: 14    Subjective reports mild but manageable soreness following last visit. Noticing improved ability to perform passive stretch with over the door pulleys.       Objective     See Exercise, Manual, and Modality Logs for complete treatment.     Assessment/Plan Improved flexion to 140 degrees, continued teres minor tightness limiting scapular upward rotation. Patient will continue to benefit from improved passive motion in order to progress to base scapular strengthening.     Progress per Plan of Care         Timed:         Manual Therapy:    20     mins  55962;     Therapeutic Exercise:    15     mins  97222;     Neuromuscular Mallorie:        mins  67976;    Therapeutic Activity:          mins  39952;     Gait Training:           mins  33120;     Ultrasound:          mins  50884;    Ionto                                  mins   08791  Canalith Repos                   mins  72113    Un-Timed:  Electrical Stimulation:    15     mins  74141 ( );  Dry Needling          mins self-pay  Traction          mins 43006    Timed Treatment:   35   mins   Total Treatment:     60   mins    Bulmaro Monique PTA  Physical Therapist

## 2020-09-14 ENCOUNTER — TREATMENT (OUTPATIENT)
Dept: PHYSICAL THERAPY | Facility: CLINIC | Age: 40
End: 2020-09-14

## 2020-09-14 DIAGNOSIS — S46.811D TRAUMATIC TEAR OF SUPRASPINATUS TENDON OF RIGHT SHOULDER, SUBSEQUENT ENCOUNTER: Primary | ICD-10-CM

## 2020-09-14 DIAGNOSIS — M75.52 BURSITIS OF LEFT SHOULDER: ICD-10-CM

## 2020-09-14 DIAGNOSIS — M25.512 PAIN IN JOINT OF LEFT SHOULDER: ICD-10-CM

## 2020-09-14 PROCEDURE — 97110 THERAPEUTIC EXERCISES: CPT | Performed by: PHYSICAL THERAPIST

## 2020-09-14 PROCEDURE — 97014 ELECTRIC STIMULATION THERAPY: CPT | Performed by: PHYSICAL THERAPIST

## 2020-09-14 PROCEDURE — 97140 MANUAL THERAPY 1/> REGIONS: CPT | Performed by: PHYSICAL THERAPIST

## 2020-09-14 NOTE — PROGRESS NOTES
Physical Therapy Daily Progress Note      Patient: Rashmi Diana   : 1980  Diagnosis/ICD-10 Code:  Traumatic tear of supraspinatus tendon of right shoulder, subsequent encounter [S46.811D]   Problem List Items Addressed This Visit        Nervous and Auditory    Pain in joint of left shoulder       Musculoskeletal and Integument    Bursitis of left shoulder    Traumatic tear of supraspinatus tendon of right shoulder - Primary         Referring practitioner: Dylan Valencia, *  Date of Initial Visit: Type: THERAPY  Noted: 2020  Today's Date: 2020    VISIT#: 15    Subjective Patient reports feeling improved shoulder mobility over the weekend and is pleased with progress.       Objective improved passive motion to 150 degrees flexion.     See Exercise, Manual, and Modality Logs for complete treatment.     Assessment/Plan Required less cueing to relax and is demonstrating improved scapular upward rotation with passive shoulder flexion. Patient making gradual gains towards goals at this time.     Progress per Plan of Care         Timed:         Manual Therapy:    20     mins  98342;     Therapeutic Exercise:    15     mins  90944;     Neuromuscular Mallorie:        mins  19915;    Therapeutic Activity:          mins  07373;     Gait Training:           mins  69023;     Ultrasound:          mins  27945;    Ionto                                   mins   80179  Canalith Repos                   mins  47351    Un-Timed:  Electrical Stimulation:    15     mins  01562 ( );  Dry Needling          mins self-pay  Traction          mins 76033    Timed Treatment:   35   mins   Total Treatment:     60   mins    Bulmaro Monique PTA  Physical Therapist

## 2020-09-16 ENCOUNTER — TREATMENT (OUTPATIENT)
Dept: PHYSICAL THERAPY | Facility: CLINIC | Age: 40
End: 2020-09-16

## 2020-09-16 DIAGNOSIS — M25.512 PAIN IN JOINT OF LEFT SHOULDER: ICD-10-CM

## 2020-09-16 DIAGNOSIS — S46.811D TRAUMATIC TEAR OF SUPRASPINATUS TENDON OF RIGHT SHOULDER, SUBSEQUENT ENCOUNTER: Primary | ICD-10-CM

## 2020-09-16 DIAGNOSIS — M75.52 BURSITIS OF LEFT SHOULDER: ICD-10-CM

## 2020-09-16 PROCEDURE — 97110 THERAPEUTIC EXERCISES: CPT | Performed by: PHYSICAL THERAPIST

## 2020-09-16 PROCEDURE — DRYNDL PR CUSTOM DRY NEEDLING SELF PAY: Performed by: PHYSICAL THERAPIST

## 2020-09-16 PROCEDURE — 97014 ELECTRIC STIMULATION THERAPY: CPT | Performed by: PHYSICAL THERAPIST

## 2020-09-16 PROCEDURE — 97140 MANUAL THERAPY 1/> REGIONS: CPT | Performed by: PHYSICAL THERAPIST

## 2020-09-16 NOTE — PROGRESS NOTES
Physical Therapy Daily Progress Note      Patient: Rashmi Diana   : 1980  Diagnosis/ICD-10 Code:  Traumatic tear of supraspinatus tendon of right shoulder, subsequent encounter [S46.811D]   Problem List Items Addressed This Visit        Nervous and Auditory    Pain in joint of left shoulder       Musculoskeletal and Integument    Bursitis of left shoulder    Traumatic tear of supraspinatus tendon of right shoulder - Primary         Referring practitioner: Dylan Valencia, *  Date of Initial Visit: Type: THERAPY  Noted: 2020  Today's Date: 2020    VISIT#: 16    Subjective patient reports tightness has returned and continues to feel lateral shoulder discomfort.       Objective resumed TDN R UT/ subscapularis,  Manual stretch beginning and end of treatment.     See Exercise, Manual, and Modality Logs for complete treatment.     Assessment/Plan Patient responded well to TDN improving shoulder flexion to 150 degrees with more ease. Patient will continue to benefit from improved passive motion and base scapular strength.     Progress per Plan of Care         Timed:         Manual Therapy:    25     mins  35684;     Therapeutic Exercise:    15     mins  17144;     Neuromuscular Mallorie:        mins  55077;    Therapeutic Activity:          mins  98602;     Gait Training:           mins  37426;     Ultrasound:          mins  79542;    Ionto                                   mins   84628  Canalith Repos                   mins  31053    Un-Timed:  Electrical Stimulation:    15     mins  13063 ( );  Dry Needling     10     mins self-pay  Traction          mins 31939    Timed Treatment:   40   mins   Total Treatment:     65   mins    Bulmaro Monique PTA  Physical Therapist

## 2020-09-21 ENCOUNTER — TREATMENT (OUTPATIENT)
Dept: PHYSICAL THERAPY | Facility: CLINIC | Age: 40
End: 2020-09-21

## 2020-09-21 DIAGNOSIS — M75.52 BURSITIS OF LEFT SHOULDER: ICD-10-CM

## 2020-09-21 DIAGNOSIS — M25.512 PAIN IN JOINT OF LEFT SHOULDER: ICD-10-CM

## 2020-09-21 DIAGNOSIS — S46.811D TRAUMATIC TEAR OF SUPRASPINATUS TENDON OF RIGHT SHOULDER, SUBSEQUENT ENCOUNTER: Primary | ICD-10-CM

## 2020-09-21 PROCEDURE — DRYNDL PR CUSTOM DRY NEEDLING SELF PAY: Performed by: PHYSICAL THERAPIST

## 2020-09-21 PROCEDURE — 97014 ELECTRIC STIMULATION THERAPY: CPT | Performed by: PHYSICAL THERAPIST

## 2020-09-21 PROCEDURE — 97140 MANUAL THERAPY 1/> REGIONS: CPT | Performed by: PHYSICAL THERAPIST

## 2020-09-21 PROCEDURE — 97110 THERAPEUTIC EXERCISES: CPT | Performed by: PHYSICAL THERAPIST

## 2020-09-21 NOTE — PROGRESS NOTES
Physical Therapy Daily Progress Note      Patient: Rashmi Diana   : 1980  Diagnosis/ICD-10 Code:  Traumatic tear of supraspinatus tendon of right shoulder, subsequent encounter [S46.811D]   Problem List Items Addressed This Visit        Nervous and Auditory    Pain in joint of left shoulder       Musculoskeletal and Integument    Bursitis of left shoulder    Traumatic tear of supraspinatus tendon of right shoulder - Primary         Referring practitioner: Dylan Valencia, *  Date of Initial Visit: Type: THERAPY  Noted: 2020  Today's Date: 2020    VISIT#: 17    Subjective Patient reports having continued tightness but was able to put hair into a high ponytail.       Objective Added wall push ups, performed TDN R UT, teres minor, subscapularis by Kelli HOOD.     See Exercise, Manual, and Modality Logs for complete treatment.     Assessment/Plan Patient achieving 145 degrees flexion with pulleys before feeling impingement like symptoms. Patient responding well to TDN achieving 145 degrees quicker without impingement like symptoms. Patient will continue to benefit from improved strength and scapular awareness.     Progress per Plan of Care         Timed:         Manual Therapy:    15     mins  31712;     Therapeutic Exercise:    25     mins  21007;     Neuromuscular Mallorie:        mins  67507;    Therapeutic Activity:          mins  01069;     Gait Training:           mins  07025;     Ultrasound:          mins  75895;    Ionto                                   mins   51417  Canalith Repos                   mins  78542    Un-Timed:  Electrical Stimulation:    15     mins  09761 ( );  Dry Needling     10     mins self-pay  Traction          mins 02275    Timed Treatment:   50   mins   Total Treatment:     65   mins    Bulmaro Monique PTA  Physical Therapist

## 2020-09-23 ENCOUNTER — TREATMENT (OUTPATIENT)
Dept: PHYSICAL THERAPY | Facility: CLINIC | Age: 40
End: 2020-09-23

## 2020-09-23 DIAGNOSIS — S46.811D TRAUMATIC TEAR OF SUPRASPINATUS TENDON OF RIGHT SHOULDER, SUBSEQUENT ENCOUNTER: Primary | ICD-10-CM

## 2020-09-23 DIAGNOSIS — M25.511 ACUTE PAIN OF RIGHT SHOULDER: ICD-10-CM

## 2020-09-23 DIAGNOSIS — S46.011D TRAUMATIC TEAR OF RIGHT ROTATOR CUFF, SUBSEQUENT ENCOUNTER: ICD-10-CM

## 2020-09-23 PROCEDURE — DRYNDL PR CUSTOM DRY NEEDLING SELF PAY: Performed by: PHYSICAL THERAPIST

## 2020-09-23 PROCEDURE — 97014 ELECTRIC STIMULATION THERAPY: CPT | Performed by: PHYSICAL THERAPIST

## 2020-09-23 PROCEDURE — 97110 THERAPEUTIC EXERCISES: CPT | Performed by: PHYSICAL THERAPIST

## 2020-09-23 PROCEDURE — 97140 MANUAL THERAPY 1/> REGIONS: CPT | Performed by: PHYSICAL THERAPIST

## 2020-09-24 NOTE — PROGRESS NOTES
Physical Therapy Daily Progress Note      Patient: Rashmi Diana   : 1980  Diagnosis/ICD-10 Code:  Traumatic tear of supraspinatus tendon of right shoulder, subsequent encounter [S46.811D]   Problem List Items Addressed This Visit        Musculoskeletal and Integument    Traumatic tear of supraspinatus tendon of right shoulder - Primary      Other Visit Diagnoses     Traumatic tear of right rotator cuff, subsequent encounter        Acute pain of right shoulder            Referring practitioner: Dylan Valencia, *  Date of Initial Visit: Type: THERAPY  Noted: 2020  Today's Date: 2020    VISIT#: 18    Subjective : Reports continued tightness. Wants to be able to use arm better.      Objective : TDN done this date of B UT, R teres minor and subscapularis by trained PT. Add contract/relax flex standing with red tband. Also added weighted pendulums with cues for scap awareness. Pt with difficulty holding scap position and relaxing shoulder but with practice this improved.     See Exercise, Manual, and Modality Logs for complete treatment.     Assessment/Plan : Pt demonstrating improved flexion with over-the-door pulleys at end of session. Responded well to contract/relax flex exercise. Improved flexion ROM post-tx. Will benefit from continued strengthening and scap awareness.    Progress per Plan of Care         Timed:         Manual Therapy:    15     mins  93708;     Therapeutic Exercise:    25     mins  61957;     Neuromuscular Mallorie:        mins  05947;    Therapeutic Activity:          mins  79919;     Gait Training:           mins  05057;     Ultrasound:          mins  15996;    Ionto                                   mins   72742  Self Care                            mins   77978  Canalith Repos                   mins  37445    Un-Timed:  Electrical Stimulation:    15     mins  38419 ( );  Dry Needling     10     mins self-pay  Traction         mins 87262  Low Eval          Mins   46073  Mod Eval          Mins  98932  High Eval                            Mins  00803  Re-Eval                               mins  76735    Timed Treatment:   40   mins   Total Treatment:     75   mins    Minnie Kathleen, PT  Physical Therapist

## 2020-09-28 ENCOUNTER — TREATMENT (OUTPATIENT)
Dept: PHYSICAL THERAPY | Facility: CLINIC | Age: 40
End: 2020-09-28

## 2020-09-28 DIAGNOSIS — M25.511 ACUTE PAIN OF RIGHT SHOULDER: ICD-10-CM

## 2020-09-28 DIAGNOSIS — S46.811D TRAUMATIC TEAR OF SUPRASPINATUS TENDON OF RIGHT SHOULDER, SUBSEQUENT ENCOUNTER: Primary | ICD-10-CM

## 2020-09-28 DIAGNOSIS — S46.011D TRAUMATIC TEAR OF RIGHT ROTATOR CUFF, SUBSEQUENT ENCOUNTER: ICD-10-CM

## 2020-09-28 PROCEDURE — 97014 ELECTRIC STIMULATION THERAPY: CPT | Performed by: PHYSICAL THERAPIST

## 2020-09-28 PROCEDURE — 97110 THERAPEUTIC EXERCISES: CPT | Performed by: PHYSICAL THERAPIST

## 2020-09-28 PROCEDURE — 97140 MANUAL THERAPY 1/> REGIONS: CPT | Performed by: PHYSICAL THERAPIST

## 2020-09-28 PROCEDURE — DRYNDL PR CUSTOM DRY NEEDLING SELF PAY: Performed by: PHYSICAL THERAPIST

## 2020-09-28 NOTE — PROGRESS NOTES
Physical Therapy Daily Progress Note      Patient: Rashmi Diana   : 1980  Diagnosis/ICD-10 Code:  Traumatic tear of supraspinatus tendon of right shoulder, subsequent encounter [S46.811D]   Problem List Items Addressed This Visit        Musculoskeletal and Integument    Traumatic tear of supraspinatus tendon of right shoulder - Primary      Other Visit Diagnoses     Traumatic tear of right rotator cuff, subsequent encounter        Acute pain of right shoulder             Referring practitioner: Dylan Valencia, *  Date of Initial Visit: Type: THERAPY  Noted: 2020  Today's Date: 2020    VISIT#: 19    Subjective Patient reports beginning to notice improved mobility in shoulder and is pleased with progress with decreased aching in shoulder.       Objective Added PNF seatbelt 1.5#, TDN performed on R subscap, teres minor, deltoid, UT. PROM flexion to 155 degrees on OTD pulleys.     See Exercise, Manual, and Modality Logs for complete treatment.     Assessment/Plan Patient continues to respond well to dry needling with improved capsular mobility. Patient making progress towards goals with improved passive motion.     Progress per Plan of Care         Timed:         Manual Therapy:    15     mins  23624;     Therapeutic Exercise:    20     mins  98317;     Neuromuscular Mallorie:        mins  73593;    Therapeutic Activity:          mins  07691;     Gait Training:           mins  57254;     Ultrasound:          mins  34454;    Ionto                                   mins   32663  Canalith Repos                   mins  39600    Un-Timed:  Electrical Stimulation:    15     mins  51803 ( );  Dry Needling     10     mins self-pay  Traction          mins 14992    Timed Treatment:   45   mins   Total Treatment:     60   mins    Bulmaro Monique PTA  Physical Therapist

## 2020-09-30 ENCOUNTER — TREATMENT (OUTPATIENT)
Dept: PHYSICAL THERAPY | Facility: CLINIC | Age: 40
End: 2020-09-30

## 2020-09-30 DIAGNOSIS — M25.511 ACUTE PAIN OF RIGHT SHOULDER: ICD-10-CM

## 2020-09-30 DIAGNOSIS — S46.011D TRAUMATIC TEAR OF RIGHT ROTATOR CUFF, SUBSEQUENT ENCOUNTER: ICD-10-CM

## 2020-09-30 DIAGNOSIS — S46.811D TRAUMATIC TEAR OF SUPRASPINATUS TENDON OF RIGHT SHOULDER, SUBSEQUENT ENCOUNTER: Primary | ICD-10-CM

## 2020-09-30 PROCEDURE — 97140 MANUAL THERAPY 1/> REGIONS: CPT | Performed by: PHYSICAL THERAPIST

## 2020-09-30 PROCEDURE — 97110 THERAPEUTIC EXERCISES: CPT | Performed by: PHYSICAL THERAPIST

## 2020-09-30 PROCEDURE — DRYNDL PR CUSTOM DRY NEEDLING SELF PAY: Performed by: PHYSICAL THERAPIST

## 2020-09-30 PROCEDURE — 97014 ELECTRIC STIMULATION THERAPY: CPT | Performed by: PHYSICAL THERAPIST

## 2020-09-30 NOTE — PROGRESS NOTES
Re-Assessment / Re-Certification        Patient: Rashmi Diana   : 1980  Diagnosis/ICD-10 Code:  Traumatic tear of supraspinatus tendon of right shoulder, subsequent encounter [S46.813A]  Referring practitioner: Dylan Valencia, *  Date of Initial Visit: Type: THERAPY  Noted: 2020  Today's Date: 2020  Patient seen for 20 sessions      Subjective:   Rashmi Diana reports: her shoulder is feeling tight today. States she is doing HEP consistently and wishes her arm would quit tightening back up. Has reported longer relief of tightness with dry needling.  Subjective Questionnaire: QuickDASH: not completed this date in error.  Clinical Progress: improved but with slow ROM gains.  Home Program Compliance: Yes  Treatment has included: therapeutic exercise, manual therapy, electrical stimulation, dry needling and moist heat    Objective : PROM flex = 150 deg, abd = 140. PROM measurements were after stretching.   Flex AROM in standing to 80 deg with start of shoulder hiking and compensation.   Pt with significant tightness at lats and teres. Pt responds well to dry needling to UT, posterior lateral scap border, and subscapularis.    Assessment/Plan : PROM improved post-tx. Cues provided to perform ther ex slower. Needs continued work on L shoulder ROM and scap strengthening.  Progress toward previous goals: Partially Met   STG:  - Increase R shoulder flex PROM to 120 deg and Ext Rt to 45 deg in 2 weeks. - partially met  - Pt to demonstrate improved posture with min verbal cues in 2 weeks. - MET  - Pt to report 50% improvement in pain in 3 weeks. - MET  LTG:  - Improve Quick DASH to 20% or less impairment by discharge. - Not met  - Increase R shoulder flex strength to 4-/5 for improved functional use of by discharge. - Not met  - Pt to perform all home and work tasks with max pain of 2/10 by discharge. - Not met  - Pt to be independent with HEP by discharge. - Progressing    New Goals  Short-term  goals (STG): Increase R shoulder flex AROM to 100 deg with good mechanics in 3 weeks.  Long-term goals (LTG):  Plan to continue to work toward established goals.      Recommendations: Continue as planned  Timeframe: 20 additional visits if needed  Prognosis to achieve goals: good    PT Signature: Minnie Kathleen, PT      Based upon review of the patient's progress and continued therapy plan, it is my medical opinion that Rashmi Diana should continue physical therapy treatment at [facility]  [address] [place] [address] BIA  [place] [address]  [facility] [place] [address] [phone].    Signature: __________________________________  Dylan Valencia MD    Timed:         Manual Therapy:    15     mins  10799;     Therapeutic Exercise:    20     mins  55384;     Neuromuscular Mallorie:        mins  83648;    Therapeutic Activity:          mins  62466;     Gait Training:           mins  67823;     Ultrasound:          mins  43824;    Ionto                                   mins   46915  Self Care                            mins   20972  Canalith Repos                   mins  4209    Un-Timed:  Electrical Stimulation:    15     mins  16093 ( );  Dry Needling     10     mins self-pay  Traction          mins 64132  Low Eval          Mins  53671  Mod Eval          Mins  86507  High Eval                            Mins  62504  Re-Eval                               mins  43240      Timed Treatment:   35   mins   Total Treatment:     70   mins

## 2020-10-07 ENCOUNTER — TELEPHONE (OUTPATIENT)
Dept: ORTHOPEDIC SURGERY | Facility: CLINIC | Age: 40
End: 2020-10-07

## 2020-10-07 ENCOUNTER — TREATMENT (OUTPATIENT)
Dept: PHYSICAL THERAPY | Facility: CLINIC | Age: 40
End: 2020-10-07

## 2020-10-07 DIAGNOSIS — S46.011D TRAUMATIC COMPLETE TEAR OF RIGHT ROTATOR CUFF, SUBSEQUENT ENCOUNTER: Primary | ICD-10-CM

## 2020-10-07 DIAGNOSIS — M75.52 BURSITIS OF LEFT SHOULDER: ICD-10-CM

## 2020-10-07 DIAGNOSIS — M25.512 PAIN IN JOINT OF LEFT SHOULDER: ICD-10-CM

## 2020-10-07 DIAGNOSIS — S46.011D TRAUMATIC TEAR OF RIGHT ROTATOR CUFF, SUBSEQUENT ENCOUNTER: ICD-10-CM

## 2020-10-07 DIAGNOSIS — M25.511 ACUTE PAIN OF RIGHT SHOULDER: ICD-10-CM

## 2020-10-07 DIAGNOSIS — S46.811D TRAUMATIC TEAR OF SUPRASPINATUS TENDON OF RIGHT SHOULDER, SUBSEQUENT ENCOUNTER: Primary | ICD-10-CM

## 2020-10-07 PROCEDURE — 97140 MANUAL THERAPY 1/> REGIONS: CPT | Performed by: PHYSICAL THERAPIST

## 2020-10-07 PROCEDURE — 97110 THERAPEUTIC EXERCISES: CPT | Performed by: PHYSICAL THERAPIST

## 2020-10-07 PROCEDURE — 97014 ELECTRIC STIMULATION THERAPY: CPT | Performed by: PHYSICAL THERAPIST

## 2020-10-07 NOTE — PROGRESS NOTES
Physical Therapy Daily Progress Note      Patient: Rashmi Diana   : 1980  Diagnosis/ICD-10 Code:  Traumatic tear of supraspinatus tendon of right shoulder, subsequent encounter [S46.811D]   Problem List Items Addressed This Visit        Nervous and Auditory    Pain in joint of left shoulder       Musculoskeletal and Integument    Bursitis of left shoulder    Traumatic tear of supraspinatus tendon of right shoulder - Primary      Other Visit Diagnoses     Traumatic tear of right rotator cuff, subsequent encounter        Acute pain of right shoulder             Referring practitioner: Dylan Valencia, *  Date of Initial Visit: Type: THERAPY  Noted: 2020  Today's Date: 10/7/2020    VISIT#: 21    Subjective Patient reports having wet cupping and dry cupping performed in left shoulder an is feeling improved mobility in arm, feeling more normal.       Objective Passive flexion improved to 160 degrees, active flexion to 80 degrees with UT hiking.     See Exercise, Manual, and Modality Logs for complete treatment.     Assessment/Plan Patient demonstrated improved passive mobility with decreased impingement like symptoms. Patient will continue to benefit from improved base scapular strength and passive motion in order to return to full and pain free function on L UE.   STG:  - Increase R shoulder flex PROM to 120 deg and Ext Rt to 45 deg in 2 weeks. - partially met  - Pt to demonstrate improved posture with min verbal cues in 2 weeks. - MET  - Pt to report 50% improvement in pain in 3 weeks. - MET  LTG:  - Improve Quick DASH to 20% or less impairment by discharge. - Not met  - Increase R shoulder flex strength to 4-/5 for improved functional use of by discharge. - Not met  - Pt to perform all home and work tasks with max pain of 2/10 by discharge. - Not met  - Pt to be independent with HEP by discharge. - Progressing  Progress per Plan of Care         Timed:         Manual Therapy:    15     mins   69294;     Therapeutic Exercise:    20     mins  51205;     Neuromuscular Mallorie:        mins  98410;    Therapeutic Activity:          mins  68660;     Gait Training:           mins  22282;     Ultrasound:          mins  81438;    Ionto                                   mins   54204  Canalith Repos                   mins  51148    Un-Timed:  Electrical Stimulation:    15     mins  31214 ( );  Dry Needling          mins self-pay  Traction          mins 80713    Timed Treatment:   35   mins   Total Treatment:     50   mins    Bulmaro Monique PTA  Physical Therapist

## 2020-10-08 NOTE — TELEPHONE ENCOUNTER
Jesus denied Duexis because she hasn't had a trial of either generic misoprostol, a generic histamine- 2 receptor antagonist, or a preferred PPIs.     I called and verified with patient and she said she has never had any stomach issues or ever been on Omeprazole or generic Pantoprazole.     Patient hasn't to know if we can prescribe stronger dose of Ibuprofen.

## 2020-10-09 ENCOUNTER — TREATMENT (OUTPATIENT)
Dept: PHYSICAL THERAPY | Facility: CLINIC | Age: 40
End: 2020-10-09

## 2020-10-09 DIAGNOSIS — S46.011D TRAUMATIC TEAR OF RIGHT ROTATOR CUFF, SUBSEQUENT ENCOUNTER: ICD-10-CM

## 2020-10-09 DIAGNOSIS — S46.811D TRAUMATIC TEAR OF SUPRASPINATUS TENDON OF RIGHT SHOULDER, SUBSEQUENT ENCOUNTER: Primary | ICD-10-CM

## 2020-10-09 DIAGNOSIS — M25.511 ACUTE PAIN OF RIGHT SHOULDER: ICD-10-CM

## 2020-10-09 PROCEDURE — 97110 THERAPEUTIC EXERCISES: CPT | Performed by: PHYSICAL THERAPIST

## 2020-10-09 PROCEDURE — 97140 MANUAL THERAPY 1/> REGIONS: CPT | Performed by: PHYSICAL THERAPIST

## 2020-10-09 PROCEDURE — 97014 ELECTRIC STIMULATION THERAPY: CPT | Performed by: PHYSICAL THERAPIST

## 2020-10-09 PROCEDURE — DRYNDL PR CUSTOM DRY NEEDLING SELF PAY: Performed by: PHYSICAL THERAPIST

## 2020-10-09 NOTE — PROGRESS NOTES
Physical Therapy Daily Progress Note      Patient: Rashmi Diana   : 1980  Diagnosis/ICD-10 Code:  Traumatic tear of supraspinatus tendon of right shoulder, subsequent encounter [S46.811D]   Problem List Items Addressed This Visit        Musculoskeletal and Integument    Traumatic tear of supraspinatus tendon of right shoulder - Primary      Other Visit Diagnoses     Traumatic tear of right rotator cuff, subsequent encounter        Acute pain of right shoulder             Referring practitioner: Dylan Valencia, *  Date of Initial Visit: Type: THERAPY  Noted: 2020  Today's Date: 10/9/2020    VISIT#: 22    Subjective Patient reports noticing increased tightness in right shoulder.       Objective performed TDN to R teres minor, subscapularis, UT, distal tricep. Passive flexion to 160 degrees post stretch     See Exercise, Manual, and Modality Logs for complete treatment.     Assessment/Plan Patient continues to require aggressive stretch to improve to 160 degrees flexion. Patient will continue to benefit form improved shoulder mobility and base scapular strength for improved stability with functional use. Discussed possibility of manipulation due to continued tightness and lack of improved ROM. Patient to follow up with Dr. Valencia next Tuesday.     Progress per Plan of Care         Timed:         Manual Therapy:    20     mins  83678;     Therapeutic Exercise:    20     mins  92761;     Neuromuscular Mallorie:        mins  70549;    Therapeutic Activity:          mins  47295;     Gait Training:           mins  51346;     Ultrasound:          mins  87864;    Ionto                                   mins   75913  Canalith Repos                   mins  01892    Un-Timed:  Electrical Stimulation:    15     mins  26359 (MC );  Dry Needling     10     mins self-pay  Traction          mins 71253    Timed Treatment:   50   mins   Total Treatment:     60   mins    Bulmaro Monique PTA  Physical Therapist

## 2020-10-10 RX ORDER — IBUPROFEN 800 MG/1
800 TABLET ORAL 3 TIMES DAILY
Qty: 90 TABLET | Refills: 0 | Status: SHIPPED | OUTPATIENT
Start: 2020-10-10 | End: 2020-11-03

## 2020-10-12 ENCOUNTER — TREATMENT (OUTPATIENT)
Dept: PHYSICAL THERAPY | Facility: CLINIC | Age: 40
End: 2020-10-12

## 2020-10-12 DIAGNOSIS — S46.011D TRAUMATIC TEAR OF RIGHT ROTATOR CUFF, SUBSEQUENT ENCOUNTER: ICD-10-CM

## 2020-10-12 DIAGNOSIS — M25.511 ACUTE PAIN OF RIGHT SHOULDER: ICD-10-CM

## 2020-10-12 DIAGNOSIS — S46.811D TRAUMATIC TEAR OF SUPRASPINATUS TENDON OF RIGHT SHOULDER, SUBSEQUENT ENCOUNTER: Primary | ICD-10-CM

## 2020-10-12 PROCEDURE — 97014 ELECTRIC STIMULATION THERAPY: CPT | Performed by: PHYSICAL THERAPIST

## 2020-10-12 PROCEDURE — 97110 THERAPEUTIC EXERCISES: CPT | Performed by: PHYSICAL THERAPIST

## 2020-10-12 PROCEDURE — 97140 MANUAL THERAPY 1/> REGIONS: CPT | Performed by: PHYSICAL THERAPIST

## 2020-10-12 NOTE — PROGRESS NOTES
Physical Therapy Daily Progress Note      Patient: Rashmi Diana   : 1980  Diagnosis/ICD-10 Code:  Traumatic tear of supraspinatus tendon of right shoulder, subsequent encounter [S46.811D]   Problem List Items Addressed This Visit        Musculoskeletal and Integument    Traumatic tear of supraspinatus tendon of right shoulder - Primary      Other Visit Diagnoses     Traumatic tear of right rotator cuff, subsequent encounter        Acute pain of right shoulder            Referring practitioner: Dylan Valencia, *  Date of Initial Visit: Type: THERAPY  Noted: 2020  Today's Date: 10/12/2020    VISIT#: 23    Subjective : Reports her shoulder still feels tight all over. States she is in a lot of pain today in her shoulder, down her arm, and at her elbow. Rates current pain at 7/10. Denies any tenderness to palpation and states it all just aches. Pt sees MD on Wed and is in agreement with manipulation if he recommends it. States she just wants her arm to work again.      Objective : Tightness R UT, R teres and lats.  AROM flex = 95 deg without shoulder hiking. AAROM flex with use of pulleys = 130 deg. Pt has achieved at most 160 deg in past visits after dry needling, e-stim with MHP, and stretching but unable to sustain these gains.    See Exercise, Manual, and Modality Logs for complete treatment.     Assessment/Plan : Pt not making any additional gains in ROM and is unable to sustain gains made in previous treatments despite being diligent with HEP and frequent use of over the door pulleys for ROM. Pt is very motivated to improve. Pt will benefit from continued PT to progress strength and ROM as able.    Progress per Plan of Care         Timed:         Manual Therapy:    15     mins  30096;     Therapeutic Exercise:    30     mins  59569;     Neuromuscular Mallorie:        mins  59125;    Therapeutic Activity:          mins  99928;     Gait Training:           mins  66612;     Ultrasound:           mins  68526;    Ionto                                   mins   42534  Self Care                            mins   99939  Canalith Repos                   mins  38988    Un-Timed:  Electrical Stimulation:    15     mins  06070 ( );  Dry Needling          mins self-pay  Traction          mins 36298  Low Eval          Mins  43989  Mod Eval          Mins  66607  High Eval                            Mins  11915  Re-Eval                               mins  60405    Timed Treatment:   45   mins   Total Treatment:     70   mins    Minnie Kathleen, PT  Physical Therapist

## 2020-10-14 ENCOUNTER — TREATMENT (OUTPATIENT)
Dept: PHYSICAL THERAPY | Facility: CLINIC | Age: 40
End: 2020-10-14

## 2020-10-14 ENCOUNTER — OFFICE VISIT (OUTPATIENT)
Dept: ORTHOPEDIC SURGERY | Facility: CLINIC | Age: 40
End: 2020-10-14

## 2020-10-14 VITALS
SYSTOLIC BLOOD PRESSURE: 128 MMHG | DIASTOLIC BLOOD PRESSURE: 82 MMHG | BODY MASS INDEX: 25.34 KG/M2 | HEIGHT: 63 IN | HEART RATE: 97 BPM | WEIGHT: 143 LBS

## 2020-10-14 DIAGNOSIS — M25.511 ACUTE PAIN OF RIGHT SHOULDER: ICD-10-CM

## 2020-10-14 DIAGNOSIS — S46.011D TRAUMATIC TEAR OF RIGHT ROTATOR CUFF, SUBSEQUENT ENCOUNTER: ICD-10-CM

## 2020-10-14 DIAGNOSIS — S46.011D TRAUMATIC COMPLETE TEAR OF RIGHT ROTATOR CUFF, SUBSEQUENT ENCOUNTER: ICD-10-CM

## 2020-10-14 DIAGNOSIS — S46.811D TRAUMATIC TEAR OF SUPRASPINATUS TENDON OF RIGHT SHOULDER, SUBSEQUENT ENCOUNTER: Primary | ICD-10-CM

## 2020-10-14 DIAGNOSIS — Z47.89 ORTHOPEDIC AFTERCARE: Primary | ICD-10-CM

## 2020-10-14 DIAGNOSIS — M75.52 BURSITIS OF LEFT SHOULDER: ICD-10-CM

## 2020-10-14 PROCEDURE — 97140 MANUAL THERAPY 1/> REGIONS: CPT | Performed by: PHYSICAL THERAPIST

## 2020-10-14 PROCEDURE — 99024 POSTOP FOLLOW-UP VISIT: CPT | Performed by: ORTHOPAEDIC SURGERY

## 2020-10-14 PROCEDURE — 97014 ELECTRIC STIMULATION THERAPY: CPT | Performed by: PHYSICAL THERAPIST

## 2020-10-14 PROCEDURE — 97110 THERAPEUTIC EXERCISES: CPT | Performed by: PHYSICAL THERAPIST

## 2020-10-14 NOTE — PROGRESS NOTES
"     Patient ID: Rashmi Diana is a 40 y.o. female.  7/14/20 right shoulder arthroscopy with supraspinatus repair  Having significant difficulty regaining full range of motion      Objective:    /82   Pulse 97   Ht 158.8 cm (62.5\")   Wt 64.9 kg (143 lb)   BMI 25.74 kg/m²     Physical Examination:    Incisions are healed to the right shoulder.  Passive elevation 90 degrees abduction 70 degrees external rotation 10 degrees    Imaging:      Assessment:  Stiffness after cuff repair    Plan:  I recommend MRI to evaluate her repair  "

## 2020-10-14 NOTE — PROGRESS NOTES
Physical Therapy Daily Progress Note      Patient: Rashmi Diana   : 1980  Diagnosis/ICD-10 Code:  Traumatic tear of supraspinatus tendon of right shoulder, subsequent encounter [S46.811D]   Problems Addressed this Visit        Musculoskeletal and Integument    Bursitis of left shoulder    Traumatic tear of supraspinatus tendon of right shoulder - Primary      Other Visit Diagnoses     Traumatic tear of right rotator cuff, subsequent encounter        Acute pain of right shoulder          Diagnoses       Codes Comments    Traumatic tear of supraspinatus tendon of right shoulder, subsequent encounter    -  Primary ICD-10-CM: S46.811D  ICD-9-CM: V58.89, 840.6     Traumatic tear of right rotator cuff, subsequent encounter     ICD-10-CM: S46.011D  ICD-9-CM: V58.89, 840.4     Acute pain of right shoulder     ICD-10-CM: M25.511  ICD-9-CM: 719.41     Bursitis of left shoulder     ICD-10-CM: M75.52  ICD-9-CM: 726.10          Referring practitioner: Dylan Valencia, *  Date of Initial Visit: Type: THERAPY  Noted: 2020  Today's Date: 10/14/2020    VISIT#: 24    Subjective Patient reports having MD visit and is to have another MRI on shoulder pending insurance approval.       Objective  Added proximal humeral posterior mobs grade I     See Exercise, Manual, and Modality Logs for complete treatment.     Assessment/Plan Patient responded well to added mobs with improved passive flexion to 150 degrees and AAROM flexion to 145 degrees. Patient provided proper return demonstration with added active stretch. Patient will continue to benefit from improved passive flexion and base scapular strength.     Progress per Plan of Care         Timed:         Manual Therapy:    15     mins  12630;     Therapeutic Exercise:    30     mins  64357;     Neuromuscular Mallorie:        mins  89243;    Therapeutic Activity:          mins  14150;     Gait Training:           mins  74455;     Ultrasound:          mins  63133;    Ionto                                    mins   41258  Canalith Repos                   mins  37138    Un-Timed:  Electrical Stimulation:    15     mins  24792 ( );  Dry Needling          mins self-pay  Traction          mins 16913    Timed Treatment:   45   mins   Total Treatment:     60  mins    Bulmaro Monique PTA  Physical Therapist

## 2020-10-19 ENCOUNTER — TREATMENT (OUTPATIENT)
Dept: PHYSICAL THERAPY | Facility: CLINIC | Age: 40
End: 2020-10-19

## 2020-10-19 DIAGNOSIS — S46.011D TRAUMATIC TEAR OF RIGHT ROTATOR CUFF, SUBSEQUENT ENCOUNTER: ICD-10-CM

## 2020-10-19 DIAGNOSIS — S46.811D TRAUMATIC TEAR OF SUPRASPINATUS TENDON OF RIGHT SHOULDER, SUBSEQUENT ENCOUNTER: Primary | ICD-10-CM

## 2020-10-19 PROCEDURE — 97014 ELECTRIC STIMULATION THERAPY: CPT | Performed by: PHYSICAL THERAPIST

## 2020-10-19 PROCEDURE — 97110 THERAPEUTIC EXERCISES: CPT | Performed by: PHYSICAL THERAPIST

## 2020-10-19 PROCEDURE — DRYNDL PR CUSTOM DRY NEEDLING SELF PAY: Performed by: PHYSICAL THERAPIST

## 2020-10-19 PROCEDURE — 97140 MANUAL THERAPY 1/> REGIONS: CPT | Performed by: PHYSICAL THERAPIST

## 2020-10-19 NOTE — PROGRESS NOTES
Physical Therapy Daily Progress Note      Patient: Rashmi Diana   : 1980  Diagnosis/ICD-10 Code:  Traumatic tear of supraspinatus tendon of right shoulder, subsequent encounter [S46.811D]   Problems Addressed this Visit        Musculoskeletal and Integument    Traumatic tear of supraspinatus tendon of right shoulder - Primary      Other Visit Diagnoses     Traumatic tear of right rotator cuff, subsequent encounter          Diagnoses       Codes Comments    Traumatic tear of supraspinatus tendon of right shoulder, subsequent encounter    -  Primary ICD-10-CM: S46.811D  ICD-9-CM: V58.89, 840.6     Traumatic tear of right rotator cuff, subsequent encounter     ICD-10-CM: S46.011D  ICD-9-CM: V58.89, 840.4          Referring practitioner: Dylan Valencia, *  Date of Initial Visit: Type: THERAPY  Noted: 2020  Today's Date: 10/19/2020    VISIT#: 25    Subjective Patient reports having some soreness in posterior shoulder, with stiffness feeling returning following last visit.       Objective Resumed TDN teres minor, R UT, deltoid, latissimus dorsi. Posterior glenohumeral glides grade I  Oscillating.     See Exercise, Manual, and Modality Logs for complete treatment.     Assessment/Plan first stretch achieved 135 degrees flexion, secondary stretch post manual stretch and TDN to 155 degrees before pinch. Patient continues to have limited flexion and early scapular upward rotation causing impingement.     Progress per Plan of Care         Timed:         Manual Therapy:    15     mins  02595;     Therapeutic Exercise:    20     mins  76468;     Neuromuscular Mallorie:        mins  79158;    Therapeutic Activity:          mins  92419;     Gait Training:          mins  51325;     Ultrasound:          mins  44807;    Ionto                                   mins   73504  Canalith Repos                   mins  72712    Un-Timed:  Electrical Stimulation:    15     mins  69013 ( );  Dry Needling     10      mins self-pay  Traction          mins 41360    Timed Treatment:   45   mins   Total Treatment:     55   mins    Bulmaro Monique, ELEAZAR  Physical Therapist

## 2020-10-21 ENCOUNTER — TREATMENT (OUTPATIENT)
Dept: PHYSICAL THERAPY | Facility: CLINIC | Age: 40
End: 2020-10-21

## 2020-10-21 DIAGNOSIS — M25.511 ACUTE PAIN OF RIGHT SHOULDER: ICD-10-CM

## 2020-10-21 DIAGNOSIS — S46.811D TRAUMATIC TEAR OF SUPRASPINATUS TENDON OF RIGHT SHOULDER, SUBSEQUENT ENCOUNTER: Primary | ICD-10-CM

## 2020-10-21 DIAGNOSIS — S46.011D TRAUMATIC TEAR OF RIGHT ROTATOR CUFF, SUBSEQUENT ENCOUNTER: ICD-10-CM

## 2020-10-21 PROCEDURE — 97110 THERAPEUTIC EXERCISES: CPT | Performed by: PHYSICAL THERAPIST

## 2020-10-21 PROCEDURE — 97140 MANUAL THERAPY 1/> REGIONS: CPT | Performed by: PHYSICAL THERAPIST

## 2020-10-21 PROCEDURE — 97014 ELECTRIC STIMULATION THERAPY: CPT | Performed by: PHYSICAL THERAPIST

## 2020-10-21 NOTE — PROGRESS NOTES
Physical Therapy Daily Progress Note      Patient: Rashmi Diana   : 1980  Diagnosis/ICD-10 Code:  Traumatic tear of supraspinatus tendon of right shoulder, subsequent encounter [S46.811D]   Problems Addressed this Visit        Musculoskeletal and Integument    Traumatic tear of supraspinatus tendon of right shoulder - Primary      Other Visit Diagnoses     Traumatic tear of right rotator cuff, subsequent encounter        Acute pain of right shoulder          Diagnoses       Codes Comments    Traumatic tear of supraspinatus tendon of right shoulder, subsequent encounter    -  Primary ICD-10-CM: S46.811D  ICD-9-CM: V58.89, 840.6     Traumatic tear of right rotator cuff, subsequent encounter     ICD-10-CM: S46.011D  ICD-9-CM: V58.89, 840.4     Acute pain of right shoulder     ICD-10-CM: M25.511  ICD-9-CM: 719.41          Referring practitioner: Dylan Valencia, *  Date of Initial Visit: Type: THERAPY  Noted: 2020  Today's Date: 10/21/2020    VISIT#: 26    Subjective 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       Objective improved passive flexion to 160 degrees.     See Exercise, Manual, and Modality Logs for complete treatment.     Assessment/Plan Patient responded well to modified active stretch HEP with improved pain free functional mobility. Patient making gradual gains at this time towards goals.     Progress per Plan of Care         Timed:         Manual Therapy:    15     mins  95557;     Therapeutic Exercise:    30     mins  26029;     Neuromuscular Mallorie:        mins  03260;    Therapeutic Activity:          mins  19104;     Gait Training:           mins  27806;     Ultrasound:          mins  46690;    Ionto                                   mins   59316  Canalith Repos                   mins  39267    Un-Timed:  Electrical Stimulation:    15     mins  53346 ( );  Dry Needling          mins self-pay  Traction          mins 04614    Timed Treatment:   45    mins   Total Treatment:     70   mins    Bulmaro Monique, PTA  Physical Therapist

## 2020-10-22 ENCOUNTER — TELEPHONE (OUTPATIENT)
Dept: ORTHOPEDICS | Facility: OTHER | Age: 40
End: 2020-10-22

## 2020-10-22 NOTE — TELEPHONE ENCOUNTER
PT CALLED IN TO LET US KNOW THAT SHE HAD HER MRI DONE TODAY ON HER SHOULDER AND WAS WANTING TO MAKE HER F/U APPT TO GO OVER RESULTS SCHEDULED PT FOR A F/U FOR 11/4 TRIED TO DO REACH  TO CONFIRM THAT THIS WASN'T TOO FAR OUT FROM HER MRI NO ANSWER, ADVISED PT THAT I PUT HER ON THE WAITING LIST AS WELL AND WOULD SEND A MESSAGE OVER TO CONFIRM THAT THIS APPT DATE WAS OK. PT CAN BE REACHED @ [phone]

## 2020-10-26 ENCOUNTER — OFFICE VISIT (OUTPATIENT)
Dept: ORTHOPEDIC SURGERY | Facility: CLINIC | Age: 40
End: 2020-10-26

## 2020-10-26 ENCOUNTER — TREATMENT (OUTPATIENT)
Dept: PHYSICAL THERAPY | Facility: CLINIC | Age: 40
End: 2020-10-26

## 2020-10-26 VITALS
WEIGHT: 143 LBS | DIASTOLIC BLOOD PRESSURE: 77 MMHG | BODY MASS INDEX: 25.34 KG/M2 | SYSTOLIC BLOOD PRESSURE: 112 MMHG | HEART RATE: 81 BPM | HEIGHT: 63 IN

## 2020-10-26 DIAGNOSIS — S46.811D TRAUMATIC TEAR OF SUPRASPINATUS TENDON OF RIGHT SHOULDER, SUBSEQUENT ENCOUNTER: Primary | ICD-10-CM

## 2020-10-26 DIAGNOSIS — M25.511 ACUTE PAIN OF RIGHT SHOULDER: ICD-10-CM

## 2020-10-26 DIAGNOSIS — S46.011D TRAUMATIC TEAR OF RIGHT ROTATOR CUFF, SUBSEQUENT ENCOUNTER: ICD-10-CM

## 2020-10-26 DIAGNOSIS — M75.01 ADHESIVE CAPSULITIS OF RIGHT SHOULDER: Primary | ICD-10-CM

## 2020-10-26 PROCEDURE — DRYNDL PR CUSTOM DRY NEEDLING SELF PAY: Performed by: PHYSICAL THERAPIST

## 2020-10-26 PROCEDURE — 97140 MANUAL THERAPY 1/> REGIONS: CPT | Performed by: PHYSICAL THERAPIST

## 2020-10-26 PROCEDURE — 97014 ELECTRIC STIMULATION THERAPY: CPT | Performed by: PHYSICAL THERAPIST

## 2020-10-26 PROCEDURE — 97110 THERAPEUTIC EXERCISES: CPT | Performed by: PHYSICAL THERAPIST

## 2020-10-26 PROCEDURE — 20610 DRAIN/INJ JOINT/BURSA W/O US: CPT | Performed by: ORTHOPAEDIC SURGERY

## 2020-10-26 PROCEDURE — 99213 OFFICE O/P EST LOW 20 MIN: CPT | Performed by: ORTHOPAEDIC SURGERY

## 2020-10-26 RX ORDER — TRIAMCINOLONE ACETONIDE 40 MG/ML
40 INJECTION, SUSPENSION INTRA-ARTICULAR; INTRAMUSCULAR ONCE
Status: COMPLETED | OUTPATIENT
Start: 2020-10-26 | End: 2020-10-26

## 2020-10-26 RX ADMIN — TRIAMCINOLONE ACETONIDE 40 MG: 40 INJECTION, SUSPENSION INTRA-ARTICULAR; INTRAMUSCULAR at 10:46

## 2020-10-26 NOTE — PROGRESS NOTES
Patient ID: Rashmi Diana is a 40 y.o. female.  Right shoulder pain  7/14/20 right shoulder arthroscopy with supraspinatus repair  Having significant difficulty regaining full range of motion    Review of Systems:  Right shoulder pain  Denies chest pain      Objective:    There were no vitals taken for this visit.    Physical Examination:   She is a pleasant female in no distress. She is alert and oriented x3 and appears her stated age.  Incisions are healed to the right shoulder.  Passive elevation 90 degrees abduction 70 degrees external rotation 10 degrees  Sensory and motor exam are intact all distributions. Radial pulse is palpable and capillary refill is less than two seconds to all digits    Imaging:   MRI demonstrates intact repair with capsulitis and edema in the cuff musculature    Assessment:    Stiffness after cuff repair    Plan:  Treatment options discussed.  I recommend injection after today's evaluation. Risks and benefits of the injection were discussed. Under sterile technique and written consent I injected 40mg of Kenalog and 1cc of 1% Lidocaine in the shoulder and subacromial space. It was well tolerated  See me in 4 weeks          Disclaimer: Please note that areas of this note were completed with computer voice recognition software.  Quite often unanticipated grammatical, syntax, homophones, and other interpretive errors are inadvertently transcribed by the computer software. Please excuse any errors that have escaped final proofreading.

## 2020-10-26 NOTE — PROGRESS NOTES
Physical Therapy Daily Progress Note      Patient: Rashmi Diana   : 1980  Diagnosis/ICD-10 Code:  Traumatic tear of supraspinatus tendon of right shoulder, subsequent encounter [S46.811D]   Problems Addressed this Visit        Musculoskeletal and Integument    Traumatic tear of supraspinatus tendon of right shoulder - Primary      Other Visit Diagnoses     Traumatic tear of right rotator cuff, subsequent encounter        Acute pain of right shoulder          Diagnoses       Codes Comments    Traumatic tear of supraspinatus tendon of right shoulder, subsequent encounter    -  Primary ICD-10-CM: S46.811D  ICD-9-CM: V58.89, 840.6     Traumatic tear of right rotator cuff, subsequent encounter     ICD-10-CM: S46.011D  ICD-9-CM: V58.89, 840.4     Acute pain of right shoulder     ICD-10-CM: M25.511  ICD-9-CM: 719.41          Referring practitioner: Dylan Valencia, *  Date of Initial Visit: Type: THERAPY  Noted: 2020  Today's Date: 10/26/2020    VISIT#: 27    Subjective patient reports having MRI and received cortisone injection in R posterior shoulder capsule.       Objective passive flexion to 165 degrees. TDN R UT, deltoid, teres minor, subscapularis,     See Exercise, Manual, and Modality Logs for complete treatment.     Assessment/Plan  Patient making gradual improvements with passive motion. Patient improved to 165 degrees with more ease today. Patient will continue to benefit from improved passive motion and base scapular strength for improved stability with functional use above 90 degrees flexion.     Progress per Plan of Care         Timed:         Manual Therapy:    15     mins  52509;     Therapeutic Exercise:    30     mins  40053;     Neuromuscular Mallorie:        mins  29775;    Therapeutic Activity:          mins  60135;     Gait Training:           mins  54920;     Ultrasound:          mins  35934;    Ionto                                   mins   16982  [facility] Repos                    mins  88000    Un-Timed:  Electrical Stimulation:    15     mins  57764 ( );  Dry Needling     10     mins self-pay  Traction          mins 46342    Timed Treatment:   55   mins   Total Treatment:     70   mins    Bulmaro Monique PTA  Physical Therapist

## 2020-10-28 ENCOUNTER — TREATMENT (OUTPATIENT)
Dept: PHYSICAL THERAPY | Facility: CLINIC | Age: 40
End: 2020-10-28

## 2020-10-28 DIAGNOSIS — S46.811D TRAUMATIC TEAR OF SUPRASPINATUS TENDON OF RIGHT SHOULDER, SUBSEQUENT ENCOUNTER: Primary | ICD-10-CM

## 2020-10-28 DIAGNOSIS — S46.011D TRAUMATIC TEAR OF RIGHT ROTATOR CUFF, SUBSEQUENT ENCOUNTER: ICD-10-CM

## 2020-10-28 DIAGNOSIS — M25.511 ACUTE PAIN OF RIGHT SHOULDER: ICD-10-CM

## 2020-10-28 PROCEDURE — 97140 MANUAL THERAPY 1/> REGIONS: CPT | Performed by: PHYSICAL THERAPIST

## 2020-10-28 PROCEDURE — 97014 ELECTRIC STIMULATION THERAPY: CPT | Performed by: PHYSICAL THERAPIST

## 2020-10-28 PROCEDURE — 97110 THERAPEUTIC EXERCISES: CPT | Performed by: PHYSICAL THERAPIST

## 2020-10-28 NOTE — PROGRESS NOTES
Physical Therapy Daily Progress Note      Patient: Rashmi Diana   : 1980  Diagnosis/ICD-10 Code:  Traumatic tear of supraspinatus tendon of right shoulder, subsequent encounter [S46.811D]   Problems Addressed this Visit        Musculoskeletal and Integument    Traumatic tear of supraspinatus tendon of right shoulder - Primary      Other Visit Diagnoses     Traumatic tear of right rotator cuff, subsequent encounter        Acute pain of right shoulder          Diagnoses       Codes Comments    Traumatic tear of supraspinatus tendon of right shoulder, subsequent encounter    -  Primary ICD-10-CM: S46.811D  ICD-9-CM: V58.89, 840.6     Traumatic tear of right rotator cuff, subsequent encounter     ICD-10-CM: S46.011D  ICD-9-CM: V58.89, 840.4     Acute pain of right shoulder     ICD-10-CM: M25.511  ICD-9-CM: 719.41         Referring practitioner: Dylan Valencia, *  Date of Initial Visit: Type: THERAPY  Noted: 2020  Today's Date: 10/28/2020    VISIT#: 28    Subjective : States her shoulder does not feel as tight. Reports having pain at lateral upper arm with trying to fix her hair, stirring things, and using computer mouse. States this pain radiates down to her elbow.      Objective : Passive flexion to ~165 deg with mild resistance first noted at ~110-120 deg. Added resisted IR/ER to HEP with yellow Tband. Encouraged pt to perform standing B shoulder flex with dowel contreras in front of mirror for awareness of compensation. Pt verbalized understanding. Added ball on wall to exercises and pt stated that exercise felt good and liked it.    See Exercise, Manual, and Modality Logs for complete treatment.     Assessment/Plan : Flexion ROM improving. Continued tightness into ext rotation. Will benefit from continued PROM gains and base scap strength and progression of shoulder strengthening form improved functional use with overhead activities.    Progress per Plan of Care         Timed:         Manual  Therapy:    15     mins  74879;     Therapeutic Exercise:    30     mins  26753;     Neuromuscular Mallorie:        mins  64254;    Therapeutic Activity:          mins  07407;     Gait Training:           mins  75642;     Ultrasound:          mins  41318;    Ionto                                   mins   71583  Self Care                            mins   18467  Canalith Repos                   mins  37166    Un-Timed:  Electrical Stimulation:    15     mins  11891 ( );  Dry Needling          mins self-pay  Traction          mins 48527  Low Eval          Mins  81905  Mod Eval          Mins  20465  High Eval                            Mins  56402  Re-Eval                               mins  88088    Timed Treatment:   45   mins   Total Treatment:     70   mins    Minnie Kathleen, PT  Physical Therapist

## 2020-11-02 ENCOUNTER — TREATMENT (OUTPATIENT)
Dept: PHYSICAL THERAPY | Facility: CLINIC | Age: 40
End: 2020-11-02

## 2020-11-02 DIAGNOSIS — M25.511 ACUTE PAIN OF RIGHT SHOULDER: ICD-10-CM

## 2020-11-02 DIAGNOSIS — M25.512 PAIN IN JOINT OF LEFT SHOULDER: ICD-10-CM

## 2020-11-02 DIAGNOSIS — S46.011D TRAUMATIC TEAR OF RIGHT ROTATOR CUFF, SUBSEQUENT ENCOUNTER: ICD-10-CM

## 2020-11-02 DIAGNOSIS — M75.52 BURSITIS OF LEFT SHOULDER: ICD-10-CM

## 2020-11-02 DIAGNOSIS — S46.811D TRAUMATIC TEAR OF SUPRASPINATUS TENDON OF RIGHT SHOULDER, SUBSEQUENT ENCOUNTER: Primary | ICD-10-CM

## 2020-11-02 PROCEDURE — 97140 MANUAL THERAPY 1/> REGIONS: CPT | Performed by: PHYSICAL THERAPIST

## 2020-11-02 PROCEDURE — DRYNDL PR CUSTOM DRY NEEDLING SELF PAY: Performed by: PHYSICAL THERAPIST

## 2020-11-02 PROCEDURE — 97110 THERAPEUTIC EXERCISES: CPT | Performed by: PHYSICAL THERAPIST

## 2020-11-02 PROCEDURE — 97014 ELECTRIC STIMULATION THERAPY: CPT | Performed by: PHYSICAL THERAPIST

## 2020-11-02 NOTE — PROGRESS NOTES
Physical Therapy Daily Progress Note      Patient: Rashmi Diana   : 1980  Diagnosis/ICD-10 Code:  Traumatic tear of supraspinatus tendon of right shoulder, subsequent encounter [S46.811D]   Problems Addressed this Visit        Nervous and Auditory    Pain in joint of left shoulder       Musculoskeletal and Integument    Bursitis of left shoulder    Traumatic tear of supraspinatus tendon of right shoulder - Primary      Other Visit Diagnoses     Traumatic tear of right rotator cuff, subsequent encounter        Acute pain of right shoulder          Diagnoses       Codes Comments    Traumatic tear of supraspinatus tendon of right shoulder, subsequent encounter    -  Primary ICD-10-CM: S46.811D  ICD-9-CM: V58.89, 840.6     Traumatic tear of right rotator cuff, subsequent encounter     ICD-10-CM: S46.011D  ICD-9-CM: V58.89, 840.4     Acute pain of right shoulder     ICD-10-CM: M25.511  ICD-9-CM: 719.41     Bursitis of left shoulder     ICD-10-CM: M75.52  ICD-9-CM: 726.10     Pain in joint of left shoulder     ICD-10-CM: M25.512  ICD-9-CM: 719.41          Referring practitioner: Dylan Valencia, *  Date of Initial Visit: Type: THERAPY  Noted: 2020  Today's Date: 2020    VISIT#: 29    Subjective patient reports still feeling a little tight, but has noticed mild improvements the past couple weeks with decreased pain in shoulder.       Objective passive flexion to 165 degrees, continued limitations with ER/ IR    See Exercise, Manual, and Modality Logs for complete treatment.     Assessment/Plan Patient will continue to benefit from continued passive motion gains and improved base scapular strengthening for improved functional use of R UE. Patient making slow but gradual gains towards goals at this time.     Progress per Plan of Care         Timed:         Manual Therapy:    15     mins  82232;     Therapeutic Exercise:    20     mins  75332;     Neuromuscular Mallorie:        mins  54997;     Therapeutic Activity:          mins  52140;     Gait Training:           mins  37125;     Ultrasound:          mins  23892;    Ionto                                   mins   26722  Canalith Repos                   mins  73306    Un-Timed:  Electrical Stimulation:    15     mins  54742 ( );  Dry Needling     10     mins self-pay  Traction          mins 37910    Timed Treatment:   45   mins   Total Treatment:     80   mins    Bulmaro Monique PTA  Physical Therapist

## 2020-11-03 DIAGNOSIS — S46.011D TRAUMATIC COMPLETE TEAR OF RIGHT ROTATOR CUFF, SUBSEQUENT ENCOUNTER: ICD-10-CM

## 2020-11-03 RX ORDER — IBUPROFEN 800 MG/1
TABLET ORAL
Qty: 90 TABLET | Refills: 0 | Status: SHIPPED | OUTPATIENT
Start: 2020-11-03 | End: 2020-12-18 | Stop reason: HOSPADM

## 2020-11-04 ENCOUNTER — TREATMENT (OUTPATIENT)
Dept: PHYSICAL THERAPY | Facility: CLINIC | Age: 40
End: 2020-11-04

## 2020-11-04 DIAGNOSIS — M25.511 ACUTE PAIN OF RIGHT SHOULDER: ICD-10-CM

## 2020-11-04 DIAGNOSIS — S46.811D TRAUMATIC TEAR OF SUPRASPINATUS TENDON OF RIGHT SHOULDER, SUBSEQUENT ENCOUNTER: Primary | ICD-10-CM

## 2020-11-04 DIAGNOSIS — S46.011D TRAUMATIC TEAR OF RIGHT ROTATOR CUFF, SUBSEQUENT ENCOUNTER: ICD-10-CM

## 2020-11-04 PROCEDURE — 97140 MANUAL THERAPY 1/> REGIONS: CPT | Performed by: PHYSICAL THERAPIST

## 2020-11-04 PROCEDURE — 97014 ELECTRIC STIMULATION THERAPY: CPT | Performed by: PHYSICAL THERAPIST

## 2020-11-04 PROCEDURE — 97110 THERAPEUTIC EXERCISES: CPT | Performed by: PHYSICAL THERAPIST

## 2020-11-04 NOTE — PROGRESS NOTES
Physical Therapy Daily Progress Note      Patient: Rashmi Diana   : 1980  Diagnosis/ICD-10 Code:  Traumatic tear of supraspinatus tendon of right shoulder, subsequent encounter [S46.811D]   Problems Addressed this Visit        Musculoskeletal and Integument    Traumatic tear of supraspinatus tendon of right shoulder - Primary      Other Visit Diagnoses     Traumatic tear of right rotator cuff, subsequent encounter        Acute pain of right shoulder          Diagnoses       Codes Comments    Traumatic tear of supraspinatus tendon of right shoulder, subsequent encounter    -  Primary ICD-10-CM: S46.811D  ICD-9-CM: V58.89, 840.6     Traumatic tear of right rotator cuff, subsequent encounter     ICD-10-CM: S46.011D  ICD-9-CM: V58.89, 840.4     Acute pain of right shoulder     ICD-10-CM: M25.511  ICD-9-CM: 719.41         Referring practitioner: Dylan Valencia, *  Date of Initial Visit: Type: THERAPY  Noted: 2020  Today's Date: 2020    VISIT#: 30    Subjective : Pt states her shoulder is not hurting and has been noticing some twitches. Reports that her shoulder feels looser. Hopes to keep making gains. Has been working hard on reaching behind her back.      Objective : Instructed pt in sleeper stretching in R sidelying and at wall also. Instructed in ER stretch at doorway. Pt demonstrated good understanding. Issued copy of exercises for HEP. Added ant shoulder mobs with good response and improved IR post-tx. Added red Tband for resistance during wall slides.    See Exercise, Manual, and Modality Logs for complete treatment.     Assessment/Plan : Decreased resistance noted during flex PROM. Responds well to joint mobs. Good tolerance to all ther ex with no reports of pain.    Progress per Plan of Care         Timed:         Manual Therapy:    15     mins  86059;     Therapeutic Exercise:    25     mins  11890;     Neuromuscular Mallorie:        mins  42840;    Therapeutic Activity:          mins   22848;     Gait Training:           mins  95613;     Ultrasound:          mins  46396;    Ionto                                   mins   74728  Self Care                            mins   85225  Canalith Repos                   mins  79410    Un-Timed:  Electrical Stimulation:    15     mins  80784 ( );  Dry Needling          mins self-pay  Traction          mins 71359  Low Eval          Mins  18846  Mod Eval          Mins  37495  High Eval                            Mins  47385  Re-Eval                               mins  35179    Timed Treatment:   40   mins   Total Treatment:     65   mins    Minnie Kathleen PT  Physical Therapist

## 2020-11-09 ENCOUNTER — TREATMENT (OUTPATIENT)
Dept: PHYSICAL THERAPY | Facility: CLINIC | Age: 40
End: 2020-11-09

## 2020-11-09 DIAGNOSIS — M25.511 ACUTE PAIN OF RIGHT SHOULDER: ICD-10-CM

## 2020-11-09 DIAGNOSIS — S46.011D TRAUMATIC TEAR OF RIGHT ROTATOR CUFF, SUBSEQUENT ENCOUNTER: ICD-10-CM

## 2020-11-09 DIAGNOSIS — S46.811D TRAUMATIC TEAR OF SUPRASPINATUS TENDON OF RIGHT SHOULDER, SUBSEQUENT ENCOUNTER: Primary | ICD-10-CM

## 2020-11-09 PROCEDURE — 97014 ELECTRIC STIMULATION THERAPY: CPT | Performed by: PHYSICAL THERAPIST

## 2020-11-09 PROCEDURE — 97110 THERAPEUTIC EXERCISES: CPT | Performed by: PHYSICAL THERAPIST

## 2020-11-09 PROCEDURE — 97140 MANUAL THERAPY 1/> REGIONS: CPT | Performed by: PHYSICAL THERAPIST

## 2020-11-09 NOTE — PROGRESS NOTES
Physical Therapy Daily Progress Note      Patient: Rashmi Diana   : 1980  Diagnosis/ICD-10 Code:  Traumatic tear of supraspinatus tendon of right shoulder, subsequent encounter [S46.811D]   Problems Addressed this Visit        Musculoskeletal and Integument    Traumatic tear of supraspinatus tendon of right shoulder - Primary      Other Visit Diagnoses     Traumatic tear of right rotator cuff, subsequent encounter        Acute pain of right shoulder          Diagnoses       Codes Comments    Traumatic tear of supraspinatus tendon of right shoulder, subsequent encounter    -  Primary ICD-10-CM: S46.811D  ICD-9-CM: V58.89, 840.6     Traumatic tear of right rotator cuff, subsequent encounter     ICD-10-CM: S46.011D  ICD-9-CM: V58.89, 840.4     Acute pain of right shoulder     ICD-10-CM: M25.511  ICD-9-CM: 719.41         Referring practitioner: Dylan Valencia, *  Date of Initial Visit: Type: THERAPY  Noted: 2020  Today's Date: 2020    VISIT#: 31    Subjective : Pt reports that her shoulder is still feeling less tight. Wants her shoulder to get stronger so that she can wash and fix her hair easier. Has been working at doing stretches behind her back and feels like it is better. States she has been having some L UT pain after reaching high and retrieving a pot from overhead and cannot get the knot worked out.      Objective : Added supine punch with eccentric lower with good control.    See Exercise, Manual, and Modality Logs for complete treatment.     Assessment/Plan : Decreased pain at start of session. No resistance noted during flex PROM. Improved ROM noted in ER, IR, and flexion. Good tolerance to all ther ex with fatigue reported. Will benefit from continued scap base strengthening and R shoulder strengthening to improve functional use of R UE with all overhead tasks.    Progress per Plan of Care and Progress strengthening /stabilization /functional activity         Timed:          Manual Therapy:    20     mins  09515;     Therapeutic Exercise:    25     mins  46686;     Neuromuscular Mallorie:        mins  44807;    Therapeutic Activity:          mins  86319;     Gait Training:           mins  85018;     Ultrasound:          mins  93757;    Ionto                                   mins   61777  Self Care                            mins   67459  Canalith Repos                   mins  64473    Un-Timed:  Electrical Stimulation:    15     mins  34364 ( );  Dry Needling          mins self-pay  Traction          mins 82744  Low Eval          Mins  55722  Mod Eval          Mins  27099  High Eval                            Mins  77357  Re-Eval                               mins  43020    Timed Treatment:   45   mins   Total Treatment:     70   mins    Minnie Kathleen, PT  Physical Therapist

## 2020-11-12 ENCOUNTER — TREATMENT (OUTPATIENT)
Dept: PHYSICAL THERAPY | Facility: CLINIC | Age: 40
End: 2020-11-12

## 2020-11-12 DIAGNOSIS — S46.011D TRAUMATIC TEAR OF RIGHT ROTATOR CUFF, SUBSEQUENT ENCOUNTER: ICD-10-CM

## 2020-11-12 DIAGNOSIS — S46.811D TRAUMATIC TEAR OF SUPRASPINATUS TENDON OF RIGHT SHOULDER, SUBSEQUENT ENCOUNTER: Primary | ICD-10-CM

## 2020-11-12 DIAGNOSIS — M25.511 ACUTE PAIN OF RIGHT SHOULDER: ICD-10-CM

## 2020-11-12 PROCEDURE — 97014 ELECTRIC STIMULATION THERAPY: CPT | Performed by: PHYSICAL THERAPIST

## 2020-11-12 PROCEDURE — 97110 THERAPEUTIC EXERCISES: CPT | Performed by: PHYSICAL THERAPIST

## 2020-11-12 PROCEDURE — 97140 MANUAL THERAPY 1/> REGIONS: CPT | Performed by: PHYSICAL THERAPIST

## 2020-11-16 ENCOUNTER — TREATMENT (OUTPATIENT)
Dept: PHYSICAL THERAPY | Facility: CLINIC | Age: 40
End: 2020-11-16

## 2020-11-16 DIAGNOSIS — S46.011D TRAUMATIC TEAR OF RIGHT ROTATOR CUFF, SUBSEQUENT ENCOUNTER: ICD-10-CM

## 2020-11-16 DIAGNOSIS — M25.511 ACUTE PAIN OF RIGHT SHOULDER: ICD-10-CM

## 2020-11-16 DIAGNOSIS — S46.811D TRAUMATIC TEAR OF SUPRASPINATUS TENDON OF RIGHT SHOULDER, SUBSEQUENT ENCOUNTER: Primary | ICD-10-CM

## 2020-11-16 PROCEDURE — 97110 THERAPEUTIC EXERCISES: CPT | Performed by: PHYSICAL THERAPIST

## 2020-11-16 PROCEDURE — 97014 ELECTRIC STIMULATION THERAPY: CPT | Performed by: PHYSICAL THERAPIST

## 2020-11-16 PROCEDURE — 97140 MANUAL THERAPY 1/> REGIONS: CPT | Performed by: PHYSICAL THERAPIST

## 2020-11-16 NOTE — PROGRESS NOTES
Physical Therapy Daily Progress Note      Patient: Rashmi Diana   : 1980  Diagnosis/ICD-10 Code:  Traumatic tear of supraspinatus tendon of right shoulder, subsequent encounter [S46.811D]   Problems Addressed this Visit        Musculoskeletal and Integument    Traumatic tear of supraspinatus tendon of right shoulder - Primary      Other Visit Diagnoses     Traumatic tear of right rotator cuff, subsequent encounter        Acute pain of right shoulder          Diagnoses       Codes Comments    Traumatic tear of supraspinatus tendon of right shoulder, subsequent encounter    -  Primary ICD-10-CM: S46.811D  ICD-9-CM: V58.89, 840.6     Traumatic tear of right rotator cuff, subsequent encounter     ICD-10-CM: S46.011D  ICD-9-CM: V58.89, 840.4     Acute pain of right shoulder     ICD-10-CM: M25.511  ICD-9-CM: 719.41          Referring practitioner: Dylan Valencia, *  Date of Initial Visit: Type: THERAPY  Noted: 2020  Today's Date: 2020    VISIT#: 33    Subjective Patient reports shoulder is feeling ok, has noticed some improved mobility, just slower than would like.       Objective maintaining 160-165 degrees flexion.     See Exercise, Manual, and Modality Logs for complete treatment.     Assessment/Plan Continued tightness noted with end range motion, demonstrating improved ER/IR active motion. Patient will continue to benefit from improved ROM and base scapular strength for improved functional use.     Progress per Plan of Care         Timed:         Manual Therapy:    20     mins  81477;     Therapeutic Exercise:    25     mins  46528;     Neuromuscular Mallorie:        mins  51032;    Therapeutic Activity:          mins  34600;     Gait Training:           mins  60703;     Ultrasound:          mins  59425;    Ionto                                   mins   07971  Canalith Repos                   mins  94229    Un-Timed:  Electrical Stimulation:    15     mins  94652 ( );  Dry Needling           mins self-pay  Traction          mins 53857    Timed Treatment:   60   mins   Total Treatment:     70   mins    Bulmaro Monique, ELEAZAR  Physical Therapist

## 2020-11-18 ENCOUNTER — TREATMENT (OUTPATIENT)
Dept: PHYSICAL THERAPY | Facility: CLINIC | Age: 40
End: 2020-11-18

## 2020-11-18 DIAGNOSIS — S46.011D TRAUMATIC TEAR OF RIGHT ROTATOR CUFF, SUBSEQUENT ENCOUNTER: ICD-10-CM

## 2020-11-18 DIAGNOSIS — M25.511 ACUTE PAIN OF RIGHT SHOULDER: ICD-10-CM

## 2020-11-18 DIAGNOSIS — S46.811D TRAUMATIC TEAR OF SUPRASPINATUS TENDON OF RIGHT SHOULDER, SUBSEQUENT ENCOUNTER: Primary | ICD-10-CM

## 2020-11-18 PROCEDURE — 97140 MANUAL THERAPY 1/> REGIONS: CPT | Performed by: PHYSICAL THERAPIST

## 2020-11-18 PROCEDURE — 97110 THERAPEUTIC EXERCISES: CPT | Performed by: PHYSICAL THERAPIST

## 2020-11-18 PROCEDURE — 97014 ELECTRIC STIMULATION THERAPY: CPT | Performed by: PHYSICAL THERAPIST

## 2020-11-18 PROCEDURE — DRYNDL PR CUSTOM DRY NEEDLING SELF PAY: Performed by: PHYSICAL THERAPIST

## 2020-11-18 NOTE — PROGRESS NOTES
Physical Therapy Daily Progress Note      Patient: Rashmi Diana   : 1980  Diagnosis/ICD-10 Code:  Traumatic tear of supraspinatus tendon of right shoulder, subsequent encounter [S46.811D]   Problems Addressed this Visit        Musculoskeletal and Integument    Traumatic tear of supraspinatus tendon of right shoulder - Primary      Other Visit Diagnoses     Traumatic tear of right rotator cuff, subsequent encounter        Acute pain of right shoulder          Diagnoses       Codes Comments    Traumatic tear of supraspinatus tendon of right shoulder, subsequent encounter    -  Primary ICD-10-CM: S46.811D  ICD-9-CM: V58.89, 840.6     Traumatic tear of right rotator cuff, subsequent encounter     ICD-10-CM: S46.011D  ICD-9-CM: V58.89, 840.4     Acute pain of right shoulder     ICD-10-CM: M25.511  ICD-9-CM: 719.41          Referring practitioner: Dylan Valencia, *  Date of Initial Visit: Type: THERAPY  Noted: 2020  Today's Date: 2020    VISIT#: 34    Subjective Patient reports interest in resuming dry needling due to some increased tightness today.       Objective resumed dry needling to right UT, subscapularis, deltoid, rhomboid, teres minor.     See Exercise, Manual, and Modality Logs for complete treatment.     Assessment/Plan Laxmi Diana has attended 34 visits s/p Right RCR with great compliance and attendance. Patient biggest restraint remains with ROM and limitations of functional use with flexion to 155 degrees to 160 degrees with hard end feel and early scapular upward rotation. IR to L5 , ER to 50 degrees.     Progress per Plan of Care         Timed:         Manual Therapy:    20     mins  19640;     Therapeutic Exercise:    25     mins  33674;     Neuromuscular Mallorie:        mins  64593;    Therapeutic Activity:          mins  91018;     Gait Training:           mins  97524;     Ultrasound:          mins  16284;    Ionto                                  mins   66797  Replaced by [facility] Repos                    mins  31260    Un-Timed:  Electrical Stimulation:    15     mins  10802 ( );  Dry Needling     10     mins self-pay  Traction          mins 76188    Timed Treatment:   70   mins   Total Treatment:     80   mins    Bulmaro Monique PTA  Physical Therapist

## 2020-11-23 ENCOUNTER — OFFICE VISIT (OUTPATIENT)
Dept: ORTHOPEDIC SURGERY | Facility: CLINIC | Age: 40
End: 2020-11-23

## 2020-11-23 ENCOUNTER — PREP FOR SURGERY (OUTPATIENT)
Dept: OTHER | Facility: HOSPITAL | Age: 40
End: 2020-11-23

## 2020-11-23 VITALS
BODY MASS INDEX: 25.34 KG/M2 | SYSTOLIC BLOOD PRESSURE: 113 MMHG | DIASTOLIC BLOOD PRESSURE: 73 MMHG | HEIGHT: 63 IN | HEART RATE: 93 BPM | WEIGHT: 143 LBS

## 2020-11-23 DIAGNOSIS — M75.01 ADHESIVE CAPSULITIS OF RIGHT SHOULDER: Primary | ICD-10-CM

## 2020-11-23 PROCEDURE — 99214 OFFICE O/P EST MOD 30 MIN: CPT | Performed by: ORTHOPAEDIC SURGERY

## 2020-11-23 NOTE — PROGRESS NOTES
"     Patient ID: Rashmi Diana is a 40 y.o. female.  Right shoulder pain  7/14/20 right shoulder arthroscopy with supraspinatus repair  Had a cortisone injection on October 26 and feels like she is improved a little bit but still has significant stiffness overhead and behind her body    Review of Systems:  Right shoulder pain  Denies chest pain      Objective:    /73   Pulse 93   Ht 158.8 cm (62.5\")   Wt 64.9 kg (143 lb)   BMI 25.74 kg/m²     Physical Examination:     appears her stated age.  Incisions are healed to the right shoulder.  Passive elevation 120 degrees abduction 70 degrees external rotation 10 degrees  Sensory and motor exam are intact all distributions. Radial pulse is palpable and capillary refill is less than two seconds to all digits    Imaging:       Assessment:    Continued stiffness after cuff repair    Plan:  Further treatment options were discussed and she would like to proceed with right shoulder arthroscopic capsular release  Risks and benefits, specifically risks of bleeding, scar, infection, fracture, stiffness, retear, nerve, tendon, artery damage, the need for further surgery, DVT, and loss of life or limb were discussed. All questions were answered and addressed. Rehabilitation restrictions and timeframes were discussed.          Disclaimer: Please note that areas of this note were completed with computer voice recognition software.  Quite often unanticipated grammatical, syntax, homophones, and other interpretive errors are inadvertently transcribed by the computer software. Please excuse any errors that have escaped final proofreading.  "

## 2020-11-24 ENCOUNTER — LAB (OUTPATIENT)
Dept: LAB | Facility: HOSPITAL | Age: 40
End: 2020-11-24

## 2020-11-24 DIAGNOSIS — M75.01 ADHESIVE CAPSULITIS OF RIGHT SHOULDER: ICD-10-CM

## 2020-11-24 LAB
ANION GAP SERPL CALCULATED.3IONS-SCNC: 10 MMOL/L (ref 5–15)
APTT PPP: 24.7 SECONDS (ref 24–31)
BASOPHILS # BLD AUTO: 0.01 10*3/MM3 (ref 0–0.2)
BASOPHILS NFR BLD AUTO: 0.2 % (ref 0–1.5)
BUN SERPL-MCNC: 4 MG/DL (ref 6–20)
BUN/CREAT SERPL: 5.4 (ref 7–25)
CALCIUM SPEC-SCNC: 9.1 MG/DL (ref 8.6–10.5)
CHLORIDE SERPL-SCNC: 106 MMOL/L (ref 98–107)
CO2 SERPL-SCNC: 27 MMOL/L (ref 22–29)
CREAT SERPL-MCNC: 0.74 MG/DL (ref 0.57–1)
DEPRECATED RDW RBC AUTO: 40 FL (ref 37–54)
EOSINOPHIL # BLD AUTO: 0.02 10*3/MM3 (ref 0–0.4)
EOSINOPHIL NFR BLD AUTO: 0.4 % (ref 0.3–6.2)
ERYTHROCYTE [DISTWIDTH] IN BLOOD BY AUTOMATED COUNT: 12.3 % (ref 12.3–15.4)
GFR SERPL CREATININE-BSD FRML MDRD: 86 ML/MIN/1.73
GLUCOSE SERPL-MCNC: 89 MG/DL (ref 65–99)
HCT VFR BLD AUTO: 38.7 % (ref 34–46.6)
HGB BLD-MCNC: 13.3 G/DL (ref 12–15.9)
IMM GRANULOCYTES # BLD AUTO: 0.02 10*3/MM3 (ref 0–0.05)
IMM GRANULOCYTES NFR BLD AUTO: 0.4 % (ref 0–0.5)
INR PPP: 0.94 (ref 0.93–1.1)
LYMPHOCYTES # BLD AUTO: 1.7 10*3/MM3 (ref 0.7–3.1)
LYMPHOCYTES NFR BLD AUTO: 31 % (ref 19.6–45.3)
MCH RBC QN AUTO: 30.6 PG (ref 26.6–33)
MCHC RBC AUTO-ENTMCNC: 34.4 G/DL (ref 31.5–35.7)
MCV RBC AUTO: 89.2 FL (ref 79–97)
MONOCYTES # BLD AUTO: 0.31 10*3/MM3 (ref 0.1–0.9)
MONOCYTES NFR BLD AUTO: 5.7 % (ref 5–12)
NEUTROPHILS NFR BLD AUTO: 3.42 10*3/MM3 (ref 1.7–7)
NEUTROPHILS NFR BLD AUTO: 62.3 % (ref 42.7–76)
NRBC BLD AUTO-RTO: 0 /100 WBC (ref 0–0.2)
PLATELET # BLD AUTO: 306 10*3/MM3 (ref 140–450)
PMV BLD AUTO: 10.4 FL (ref 6–12)
POTASSIUM SERPL-SCNC: 3.6 MMOL/L (ref 3.5–5.2)
PROTHROMBIN TIME: 10.4 SECONDS (ref 9.6–11.7)
RBC # BLD AUTO: 4.34 10*6/MM3 (ref 3.77–5.28)
SODIUM SERPL-SCNC: 143 MMOL/L (ref 136–145)
WBC # BLD AUTO: 5.48 10*3/MM3 (ref 3.4–10.8)

## 2020-11-24 PROCEDURE — 80048 BASIC METABOLIC PNL TOTAL CA: CPT

## 2020-11-24 PROCEDURE — 36415 COLL VENOUS BLD VENIPUNCTURE: CPT

## 2020-11-24 PROCEDURE — 85610 PROTHROMBIN TIME: CPT

## 2020-11-24 PROCEDURE — 85730 THROMBOPLASTIN TIME PARTIAL: CPT

## 2020-11-24 PROCEDURE — 85025 COMPLETE CBC W/AUTO DIFF WBC: CPT

## 2020-11-28 ENCOUNTER — LAB (OUTPATIENT)
Dept: LAB | Facility: HOSPITAL | Age: 40
End: 2020-11-28

## 2020-11-28 DIAGNOSIS — M75.01 ADHESIVE CAPSULITIS OF RIGHT SHOULDER: ICD-10-CM

## 2020-11-28 PROCEDURE — U0004 COV-19 TEST NON-CDC HGH THRU: HCPCS

## 2020-11-28 PROCEDURE — C9803 HOPD COVID-19 SPEC COLLECT: HCPCS

## 2020-11-30 LAB — SARS-COV-2 RNA RESP QL NAA+PROBE: DETECTED

## 2020-12-16 ENCOUNTER — LAB (OUTPATIENT)
Dept: LAB | Facility: HOSPITAL | Age: 40
End: 2020-12-16

## 2020-12-16 LAB
ANION GAP SERPL CALCULATED.3IONS-SCNC: 10.1 MMOL/L (ref 5–15)
BUN SERPL-MCNC: 7 MG/DL (ref 6–20)
BUN/CREAT SERPL: 9 (ref 7–25)
CALCIUM SPEC-SCNC: 9.2 MG/DL (ref 8.6–10.5)
CHLORIDE SERPL-SCNC: 105 MMOL/L (ref 98–107)
CO2 SERPL-SCNC: 24.9 MMOL/L (ref 22–29)
CREAT SERPL-MCNC: 0.78 MG/DL (ref 0.57–1)
DEPRECATED RDW RBC AUTO: 40.7 FL (ref 37–54)
ERYTHROCYTE [DISTWIDTH] IN BLOOD BY AUTOMATED COUNT: 12.5 % (ref 12.3–15.4)
GFR SERPL CREATININE-BSD FRML MDRD: 81 ML/MIN/1.73
GLUCOSE SERPL-MCNC: 86 MG/DL (ref 65–99)
HCT VFR BLD AUTO: 39.5 % (ref 34–46.6)
HGB BLD-MCNC: 13.4 G/DL (ref 12–15.9)
MCH RBC QN AUTO: 30.5 PG (ref 26.6–33)
MCHC RBC AUTO-ENTMCNC: 33.9 G/DL (ref 31.5–35.7)
MCV RBC AUTO: 90 FL (ref 79–97)
PLATELET # BLD AUTO: 262 10*3/MM3 (ref 140–450)
PMV BLD AUTO: 10.5 FL (ref 6–12)
POTASSIUM SERPL-SCNC: 3.8 MMOL/L (ref 3.5–5.2)
RBC # BLD AUTO: 4.39 10*6/MM3 (ref 3.77–5.28)
SODIUM SERPL-SCNC: 140 MMOL/L (ref 136–145)
WBC # BLD AUTO: 4.86 10*3/MM3 (ref 3.4–10.8)

## 2020-12-16 PROCEDURE — 85027 COMPLETE CBC AUTOMATED: CPT | Performed by: ORTHOPAEDIC SURGERY

## 2020-12-16 PROCEDURE — C9803 HOPD COVID-19 SPEC COLLECT: HCPCS

## 2020-12-16 PROCEDURE — 80048 BASIC METABOLIC PNL TOTAL CA: CPT | Performed by: ORTHOPAEDIC SURGERY

## 2020-12-16 PROCEDURE — U0004 COV-19 TEST NON-CDC HGH THRU: HCPCS

## 2020-12-16 PROCEDURE — 36415 COLL VENOUS BLD VENIPUNCTURE: CPT | Performed by: ORTHOPAEDIC SURGERY

## 2020-12-17 DIAGNOSIS — M75.01 ADHESIVE CAPSULITIS OF RIGHT SHOULDER: Primary | ICD-10-CM

## 2020-12-17 LAB — SARS-COV-2 RNA RESP QL NAA+PROBE: NOT DETECTED

## 2020-12-17 RX ORDER — CEPHALEXIN 500 MG/1
500 CAPSULE ORAL 4 TIMES DAILY
Qty: 4 CAPSULE | Refills: 0 | Status: SHIPPED | OUTPATIENT
Start: 2020-12-17 | End: 2020-12-18

## 2020-12-17 RX ORDER — PROMETHAZINE HYDROCHLORIDE 12.5 MG/1
12.5 TABLET ORAL EVERY 6 HOURS PRN
Qty: 21 TABLET | Refills: 1 | Status: SHIPPED | OUTPATIENT
Start: 2020-12-17 | End: 2021-01-18

## 2020-12-17 RX ORDER — NAPROXEN 500 MG/1
500 TABLET ORAL 2 TIMES DAILY WITH MEALS
Qty: 28 TABLET | Refills: 0 | Status: SHIPPED | OUTPATIENT
Start: 2020-12-17 | End: 2021-01-18

## 2020-12-17 RX ORDER — OXYCODONE HYDROCHLORIDE AND ACETAMINOPHEN 5; 325 MG/1; MG/1
1 TABLET ORAL EVERY 6 HOURS PRN
Qty: 28 TABLET | Refills: 0 | Status: SHIPPED | OUTPATIENT
Start: 2020-12-17 | End: 2021-01-18

## 2020-12-17 NOTE — H&P
Patient Care Team:  Provider, No Known as PCP - General    Chief complaint right shoulder pain    This is a 40-year-old female with previous cuff repair earlier this year presented for shoulder arthroscopy capsular release      Review of Systems   Cardiovascular: Negative for chest pain.   Musculoskeletal: Positive for arthralgias.        Past History:  Medical History: has a past medical history of Anxiety, Headache, Hypoglycemia, Migraine, Neck pain, Right shoulder pain, Rotator cuff injury (02/01/2020), and Spastic colon.   Surgical History: has a past surgical history that includes Tonsillectomy and adenoidectomy; [place] tooth extraction; and Shoulder arthroscopy w/ rotator cuff repair (Right, 7/14/2020).   Family History: family history includes Cancer in her mother and paternal grandmother; Diabetes in her paternal grandfather.   Social History: reports that she has never smoked. She has never used smokeless tobacco. She reports previous alcohol use. She reports previous drug use. Drug: Marijuana.    No medications prior to admission.      Allergies: Patient has no known allergies.    Objective      Vital Signs       appears her stated age.  Incisions are healed to the right shoulder.  Passive elevation 120 degrees abduction 70 degrees external rotation 10 degrees  Sensory and motor exam are intact all distributions. Radial pulse is palpable and capillary refill is less than two seconds to all digits     Imaging:         Assessment:    Continued stiffness after cuff repair     Plan:  Further treatment options were discussed and she would like to proceed with right shoulder arthroscopic capsular release  Risks and benefits, specifically risks of bleeding, scar, infection, fracture, stiffness, retear, nerve, tendon, artery damage, the need for further surgery, DVT, and loss of life or limb were discussed. All questions were answered and addressed. Rehabilitation restrictions and timeframes were  discussed.

## 2020-12-18 ENCOUNTER — DOCUMENTATION (OUTPATIENT)
Dept: PHYSICAL THERAPY | Facility: CLINIC | Age: 40
End: 2020-12-18

## 2020-12-18 ENCOUNTER — ANESTHESIA (OUTPATIENT)
Dept: PERIOP | Facility: HOSPITAL | Age: 40
End: 2020-12-18

## 2020-12-18 ENCOUNTER — ANESTHESIA EVENT (OUTPATIENT)
Dept: PERIOP | Facility: HOSPITAL | Age: 40
End: 2020-12-18

## 2020-12-18 ENCOUNTER — HOSPITAL ENCOUNTER (OUTPATIENT)
Facility: HOSPITAL | Age: 40
Setting detail: HOSPITAL OUTPATIENT SURGERY
Discharge: HOME | End: 2020-12-18
Attending: ORTHOPAEDIC SURGERY | Admitting: ORTHOPAEDIC SURGERY

## 2020-12-18 ENCOUNTER — TREATMENT (OUTPATIENT)
Dept: PHYSICAL THERAPY | Facility: CLINIC | Age: 40
End: 2020-12-18

## 2020-12-18 VITALS
HEART RATE: 110 BPM | HEIGHT: 62 IN | SYSTOLIC BLOOD PRESSURE: 133 MMHG | TEMPERATURE: 98.1 F | OXYGEN SATURATION: 97 % | BODY MASS INDEX: 25.4 KG/M2 | DIASTOLIC BLOOD PRESSURE: 82 MMHG | RESPIRATION RATE: 16 BRPM | WEIGHT: 138 LBS

## 2020-12-18 DIAGNOSIS — M25.511 ACUTE PAIN OF RIGHT SHOULDER: ICD-10-CM

## 2020-12-18 DIAGNOSIS — Z98.890 STATUS POST SHOULDER SURGERY: ICD-10-CM

## 2020-12-18 DIAGNOSIS — M75.01 ADHESIVE CAPSULITIS OF RIGHT SHOULDER: ICD-10-CM

## 2020-12-18 DIAGNOSIS — S46.011D TRAUMATIC TEAR OF RIGHT ROTATOR CUFF, SUBSEQUENT ENCOUNTER: ICD-10-CM

## 2020-12-18 DIAGNOSIS — M25.511 ACUTE PAIN OF RIGHT SHOULDER: Primary | ICD-10-CM

## 2020-12-18 DIAGNOSIS — S46.811D TRAUMATIC TEAR OF SUPRASPINATUS TENDON OF RIGHT SHOULDER, SUBSEQUENT ENCOUNTER: Primary | ICD-10-CM

## 2020-12-18 LAB — B-HCG UR QL: NEGATIVE

## 2020-12-18 PROCEDURE — 25010000002 PROPOFOL 10 MG/ML EMULSION: Performed by: NURSE ANESTHETIST, CERTIFIED REGISTERED

## 2020-12-18 PROCEDURE — 97110 THERAPEUTIC EXERCISES: CPT | Performed by: PHYSICAL THERAPIST

## 2020-12-18 PROCEDURE — 97140 MANUAL THERAPY 1/> REGIONS: CPT | Performed by: PHYSICAL THERAPIST

## 2020-12-18 PROCEDURE — 25010000002 KETOROLAC TROMETHAMINE PER 15 MG: Performed by: ORTHOPAEDIC SURGERY

## 2020-12-18 PROCEDURE — 25010000002 FENTANYL CITRATE (PF) 100 MCG/2ML SOLUTION: Performed by: ANESTHESIOLOGY

## 2020-12-18 PROCEDURE — 25010000003 LIDOCAINE 1 % SOLUTION 20 ML VIAL: Performed by: ORTHOPAEDIC SURGERY

## 2020-12-18 PROCEDURE — 81025 URINE PREGNANCY TEST: CPT | Performed by: ORTHOPAEDIC SURGERY

## 2020-12-18 PROCEDURE — 25010000002 DEXAMETHASONE PER 1 MG: Performed by: ANESTHESIOLOGY

## 2020-12-18 PROCEDURE — 25010000002 DEXAMETHASONE PER 1 MG: Performed by: NURSE ANESTHETIST, CERTIFIED REGISTERED

## 2020-12-18 PROCEDURE — 25010000002 ONDANSETRON PER 1 MG: Performed by: NURSE ANESTHETIST, CERTIFIED REGISTERED

## 2020-12-18 PROCEDURE — 76942 ECHO GUIDE FOR BIOPSY: CPT | Performed by: ORTHOPAEDIC SURGERY

## 2020-12-18 PROCEDURE — 25010000002 ROPIVACAINE PER 1 MG: Performed by: ANESTHESIOLOGY

## 2020-12-18 PROCEDURE — 25010000002 EPINEPHRINE PER 0.1 MG: Performed by: ORTHOPAEDIC SURGERY

## 2020-12-18 PROCEDURE — 97161 PT EVAL LOW COMPLEX 20 MIN: CPT | Performed by: PHYSICAL THERAPIST

## 2020-12-18 PROCEDURE — 25010000002 MIDAZOLAM PER 1 MG: Performed by: ANESTHESIOLOGY

## 2020-12-18 PROCEDURE — 29825 SHO ARTHRS SRG LSS&RESCJ ADS: CPT | Performed by: ORTHOPAEDIC SURGERY

## 2020-12-18 RX ORDER — LIDOCAINE HYDROCHLORIDE 20 MG/ML
INJECTION, SOLUTION EPIDURAL; INFILTRATION; INTRACAUDAL; PERINEURAL AS NEEDED
Status: DISCONTINUED | OUTPATIENT
Start: 2020-12-18 | End: 2020-12-18 | Stop reason: SURG

## 2020-12-18 RX ORDER — ROCURONIUM BROMIDE 10 MG/ML
INJECTION, SOLUTION INTRAVENOUS AS NEEDED
Status: DISCONTINUED | OUTPATIENT
Start: 2020-12-18 | End: 2020-12-18 | Stop reason: SURG

## 2020-12-18 RX ORDER — SODIUM CHLORIDE, SODIUM LACTATE, POTASSIUM CHLORIDE, CALCIUM CHLORIDE 600; 310; 30; 20 MG/100ML; MG/100ML; MG/100ML; MG/100ML
20 INJECTION, SOLUTION INTRAVENOUS CONTINUOUS
Status: DISCONTINUED | OUTPATIENT
Start: 2020-12-18 | End: 2020-12-18 | Stop reason: HOSPADM

## 2020-12-18 RX ORDER — LIDOCAINE HYDROCHLORIDE 10 MG/ML
0.5 INJECTION, SOLUTION INFILTRATION; PERINEURAL ONCE AS NEEDED
Status: DISCONTINUED | OUTPATIENT
Start: 2020-12-18 | End: 2020-12-18 | Stop reason: HOSPADM

## 2020-12-18 RX ORDER — ONDANSETRON 2 MG/ML
INJECTION INTRAMUSCULAR; INTRAVENOUS AS NEEDED
Status: DISCONTINUED | OUTPATIENT
Start: 2020-12-18 | End: 2020-12-18 | Stop reason: SURG

## 2020-12-18 RX ORDER — FENTANYL CITRATE 50 UG/ML
50 INJECTION, SOLUTION INTRAMUSCULAR; INTRAVENOUS
Status: DISCONTINUED | OUTPATIENT
Start: 2020-12-18 | End: 2020-12-18 | Stop reason: HOSPADM

## 2020-12-18 RX ORDER — IBUPROFEN 400 MG/1
600 TABLET ORAL ONCE AS NEEDED
Status: DISCONTINUED | OUTPATIENT
Start: 2020-12-18 | End: 2020-12-18 | Stop reason: HOSPADM

## 2020-12-18 RX ORDER — FENTANYL CITRATE 50 UG/ML
INJECTION, SOLUTION INTRAMUSCULAR; INTRAVENOUS
Status: COMPLETED | OUTPATIENT
Start: 2020-12-18 | End: 2020-12-18

## 2020-12-18 RX ORDER — HYDROMORPHONE HCL 110MG/55ML
0.25 PATIENT CONTROLLED ANALGESIA SYRINGE INTRAVENOUS
Status: DISCONTINUED | OUTPATIENT
Start: 2020-12-18 | End: 2020-12-18 | Stop reason: HOSPADM

## 2020-12-18 RX ORDER — ONDANSETRON 2 MG/ML
4 INJECTION INTRAMUSCULAR; INTRAVENOUS ONCE AS NEEDED
Status: DISCONTINUED | OUTPATIENT
Start: 2020-12-18 | End: 2020-12-18 | Stop reason: HOSPADM

## 2020-12-18 RX ORDER — DEXAMETHASONE SODIUM PHOSPHATE 4 MG/ML
INJECTION, SOLUTION INTRA-ARTICULAR; INTRALESIONAL; INTRAMUSCULAR; INTRAVENOUS; SOFT TISSUE
Status: COMPLETED | OUTPATIENT
Start: 2020-12-18 | End: 2020-12-18

## 2020-12-18 RX ORDER — PROPOFOL 10 MG/ML
VIAL (ML) INTRAVENOUS AS NEEDED
Status: DISCONTINUED | OUTPATIENT
Start: 2020-12-18 | End: 2020-12-18 | Stop reason: SURG

## 2020-12-18 RX ORDER — ROPIVACAINE HYDROCHLORIDE 5 MG/ML
INJECTION, SOLUTION EPIDURAL; INFILTRATION; PERINEURAL
Status: COMPLETED | OUTPATIENT
Start: 2020-12-18 | End: 2020-12-18

## 2020-12-18 RX ORDER — SODIUM CHLORIDE 0.9 % (FLUSH) 0.9 %
10 SYRINGE (ML) INJECTION AS NEEDED
Status: DISCONTINUED | OUTPATIENT
Start: 2020-12-18 | End: 2020-12-18 | Stop reason: HOSPADM

## 2020-12-18 RX ORDER — DEXAMETHASONE SODIUM PHOSPHATE 4 MG/ML
INJECTION, SOLUTION INTRA-ARTICULAR; INTRALESIONAL; INTRAMUSCULAR; INTRAVENOUS; SOFT TISSUE AS NEEDED
Status: DISCONTINUED | OUTPATIENT
Start: 2020-12-18 | End: 2020-12-18 | Stop reason: SURG

## 2020-12-18 RX ORDER — MIDAZOLAM HYDROCHLORIDE 1 MG/ML
INJECTION INTRAMUSCULAR; INTRAVENOUS
Status: COMPLETED | OUTPATIENT
Start: 2020-12-18 | End: 2020-12-18

## 2020-12-18 RX ADMIN — SUGAMMADEX 200 MG: 100 INJECTION, SOLUTION INTRAVENOUS at 12:23

## 2020-12-18 RX ADMIN — PROPOFOL 50 MG: 10 INJECTION, EMULSION INTRAVENOUS at 11:45

## 2020-12-18 RX ADMIN — SODIUM CHLORIDE, POTASSIUM CHLORIDE, SODIUM LACTATE AND CALCIUM CHLORIDE 20 ML/HR: 600; 310; 30; 20 INJECTION, SOLUTION INTRAVENOUS at 10:31

## 2020-12-18 RX ADMIN — ONDANSETRON 4 MG: 2 INJECTION INTRAMUSCULAR; INTRAVENOUS at 12:26

## 2020-12-18 RX ADMIN — LIDOCAINE HYDROCHLORIDE 80 MG: 20 INJECTION, SOLUTION EPIDURAL; INFILTRATION; INTRACAUDAL; PERINEURAL at 11:42

## 2020-12-18 RX ADMIN — ROCURONIUM BROMIDE 40 MG: 10 INJECTION, SOLUTION INTRAVENOUS at 11:42

## 2020-12-18 RX ADMIN — DEXAMETHASONE SODIUM PHOSPHATE 4 MG: 4 INJECTION, SOLUTION INTRAMUSCULAR; INTRAVENOUS at 12:06

## 2020-12-18 RX ADMIN — CEFAZOLIN SODIUM 2 G: 1 INJECTION, POWDER, FOR SOLUTION INTRAMUSCULAR; INTRAVENOUS at 11:52

## 2020-12-18 RX ADMIN — PROPOFOL 150 MG: 10 INJECTION, EMULSION INTRAVENOUS at 11:42

## 2020-12-18 RX ADMIN — DEXAMETHASONE SODIUM PHOSPHATE 4 MG: 4 INJECTION, SOLUTION INTRAMUSCULAR; INTRAVENOUS at 11:11

## 2020-12-18 RX ADMIN — FENTANYL CITRATE 100 MCG: 50 INJECTION, SOLUTION INTRAMUSCULAR; INTRAVENOUS at 11:11

## 2020-12-18 RX ADMIN — MIDAZOLAM 2 MG: 1 INJECTION INTRAMUSCULAR; INTRAVENOUS at 11:11

## 2020-12-18 RX ADMIN — ROPIVACAINE HYDROCHLORIDE 16 ML: 5 INJECTION, SOLUTION EPIDURAL; INFILTRATION; PERINEURAL at 11:11

## 2020-12-18 NOTE — PROGRESS NOTES
Discharge Summary  Discharge Summary from Physical Therapy Report      Dates  PT visit: 7/29/2020 - 11/18/2020     Number of Visits: 34     Discharge Status of Patient: See MD Note dated 11/18/2020    Goals: Not Met    Discharge Plan: Future need for rehabilitation activities    Comments : Pt to have arthroscopic surgery with manipulation.    Date of Discharge 12/17/2020        Minnie Kathleen, PT  Physical Therapist

## 2020-12-18 NOTE — ANESTHESIA PROCEDURE NOTES
Peripheral Block      Patient reassessed immediately prior to procedure    Patient location during procedure: pre-op  Start time: 12/18/2020 11:00 AM  Reason for block: procedure for pain, at surgeon's request and post-op pain management  Performed by  Anesthesiologist: Ramiro Pandey MD  Preanesthetic Checklist  Completed: patient identified, site marked, surgical consent, pre-op evaluation, timeout performed, IV checked, risks and benefits discussed and monitors and equipment checked  Prep:  Pt Position: supine  Sterile barriers:cap, gloves and sterile barriers  Prep: ChloraPrep  Patient monitoring: blood pressure monitoring, continuous pulse oximetry and EKG  Procedure  Sedation:yes  Performed under: local infiltration  Guidance:ultrasound guided  ULTRASOUND INTERPRETATION.  Using ultrasound guidance a 20 G gauge needle was placed in close proximity to the brachial plexus nerve, at which point, under ultrasound guidance anesthetic was injected in the area of the nerve and spread of the anesthesia was seen on ultrasound in close proximity thereto.  There were no abnormalities seen on ultrasound; a digital image was taken; and the patient tolerated the procedure with no complications. Images:still images obtained, printed/placed on chart    Laterality:right  Block Type:interscalene  Injection Technique:single-shot  Needle Type:echogenic  Needle Gauge:20 G  Resistance on Injection: none  Sedation medications used: midazolam (VERSED) injection, 2 mg  fentaNYL citrate (PF) (SUBLIMAZE) injection, 100 mcg  Medications Used: dexamethasone (DECADRON) injection, 4 mg  ropivacaine (NAROPIN) 0.5 % injection, 16 mL      Post Assessment  Injection Assessment: negative aspiration for heme, no paresthesia on injection and incremental injection  Patient Tolerance:comfortable throughout block  Complications:no

## 2020-12-18 NOTE — PROGRESS NOTES
Physical Therapy Initial Evaluation and Plan of Care    Patient: Rashmi Diana   : 1980  Diagnosis/ICD-10 Code:  Acute pain of right shoulder [M25.511]  Referring practitioner: Dylan Valencia, *  Date of Initial Visit: 2020  Today's Date: 2020  Patient seen for 1 sessions           Subjective Questionnaire: QuickDASH: 68% disability      Subjective Evaluation    History of Present Illness  Mechanism of injury: Pt is a 39 yo female s/p R shoulder manipulation and arthroscopy 20.  To follow up with Dr. Valencia 20.  Current pain in R shoulder 0/10, arm is still quite asleep.  Planning to continue to work on mobility throughout the rest of today and the weekend.   is willing to help her with PROM as needed.  Pleased that scapula doesn't seem to want to push out when stretching now.  Does feel like tape is pulling her skin some when stretching.  Prior to surgery was having long term difficulty with tightness despite working really hard with physical therapy in clinic and at home as well as trying other conservative measures to improve mobility.  Hopeful to be able to put on belt, reach behind back and reach up to fix her hair.      Quality of life: good    Pain  Current pain ratin  Quality: tight  Relieving factors: ice  Progression: improved    Social Support  Lives with: spouse    Treatments  Previous treatment: physical therapy  Current treatment: physical therapy  Discharged from (in last 30 days): inpatient hospitalization  Patient Goals  Patient goals for therapy: decreased pain, decreased edema, increased motion, increased strength, independence with ADLs/IADLs and return to sport/leisure activities             Objective          Passive Range of Motion     Right Shoulder   Flexion: 160 degrees   Abduction: 120 degrees   External rotation 45°: 60 degrees   Internal rotation 45°: 45 degrees      General Comments     Shoulder Comments   Unable to actively move RUE  at this time due to recent manip and nerve block  Incisions covered with large padded adhesive bandage  Great passive mobility currently  Pt pleased with initial mobility and very willing to complete stretching through the weekend           Assessment & Plan     Assessment  Impairments: abnormal or restricted ROM, activity intolerance, impaired physical strength, lacks appropriate home exercise program and pain with function  Assessment details: Pt presents with inc R shoulder pain.  Difficulty reaching behind back.  Difficulty fixing hair.  Difficulty reaching above shoulder height.    Prognosis: fair  Functional Limitations: carrying objects, lifting, reaching behind back and reaching overhead  Goals  Plan Goals: STG  Pt I with HEP in 2 weeks.  To improve R sh active flex to 150 in 2-3 weeks.    To dec pain in shoulder 0-1/10 max in 2-3 weeks.  LTG  To dem R sh AROM WFL in 4-6 weeks.  To dem R sh strength 4+/5 grossly in 4-6 weeks.  To dec pain in R sh 0-1/10 max in 4-6 weeks.      Plan  Therapy options: will be seen for skilled physical therapy services  Planned modality interventions: electrical stimulation/Russian stimulation, thermotherapy (hydrocollator packs), cryotherapy, dry needling and ultrasound  Planned therapy interventions: flexibility, functional ROM exercises, home exercise program, manual therapy, ADL retraining, soft tissue mobilization, strengthening, stretching and therapeutic activities  Frequency: 3x week  Duration in visits: 20  Treatment plan discussed with: patient and family  Plan details: Plan to begin with daily PT x 1 week then to decrease to 2-3 x/ week x 4-5 weeks as needed to reach ROM goals.  Plan to progress active mobility and strength as tolerated.  Will continue manual and modalities for pain control as needed.          Timed:         Manual Therapy:    15     mins  29215;     Therapeutic Exercise:    10     mins  93551;     Neuromuscular Mallorie:        mins  25562;     Therapeutic Activity:          mins  63596;     Gait Training:           mins  29096;     Ultrasound:          mins  08914;    Ionto                                   mins   82342    Un-Timed:  Electrical Stimulation:         mins  24405 ( );  Dry Needling          mins self-pay  Traction          mins 79474  Low Eval    20      Mins  36544  Mod Eval          Mins  12822  High Eval                            Mins  77013        Timed Treatment:   25   mins   Total Treatment:     55   mins    PT SIGNATURE: Venus Baum, PT   DATE TREATMENT INITIATED: 12/18/2020    Initial Certification  Certification Period: 3/18/2021  I certify that the therapy services are furnished while this patient is under my care.  The services outlined above are required by this patient, and will be reviewed every 90 days.     PHYSICIAN: Dylan Valencia MD      DATE:     Please sign and return via fax to [phone].. Thank you, [facility].

## 2020-12-18 NOTE — ANESTHESIA POSTPROCEDURE EVALUATION
Patient: Rashmi Diana    Procedure Summary     Date: 12/18/20 Room / Location: [facility] OR  / [facility] MAIN OR    Anesthesia Start: 1129 Anesthesia Stop: 1232    Procedure: SHOULDER ARTHROSCOPY with capsular release (Right Shoulder) Diagnosis:       Adhesive capsulitis of right shoulder      (Adhesive capsulitis of right shoulder [M75.01])    Surgeon: Dylan Valencia MD Provider: Ramiro Pandey MD    Anesthesia Type: general with block ASA Status: 1          Anesthesia Type: general with block    Vitals  Vitals Value Taken Time   /69 12/18/20 1316   Temp 97.3 °F (36.3 °C) 12/18/20 1234   Pulse 99 12/18/20 1317   Resp 20 12/18/20 1304   SpO2 100 % 12/18/20 1317   Vitals shown include unvalidated device data.        Post Anesthesia Care and Evaluation    Patient location during evaluation: PACU  Patient participation: complete - patient participated  Level of consciousness: awake  Pain scale: See nurse's notes for pain score.  Pain management: adequate  Airway patency: patent  Anesthetic complications: No anesthetic complications  PONV Status: none  Cardiovascular status: acceptable  Respiratory status: acceptable  Hydration status: acceptable    Comments: Patient seen and examined postoperatively; vital signs stable; SpO2 greater than or equal to 90%; cardiopulmonary status stable; nausea/vomiting adequately controlled; pain adequately controlled; no apparent anesthesia complications; patient discharged from anesthesia care when discharge criteria were met

## 2020-12-18 NOTE — ANESTHESIA PREPROCEDURE EVALUATION
Anesthesia Evaluation     Patient summary reviewed and Nursing notes reviewed                Airway   Mallampati: II  TM distance: >3 FB  Neck ROM: full  No difficulty expected  Dental - normal exam     Pulmonary - negative pulmonary ROS and normal exam   Cardiovascular - negative cardio ROS and normal exam        Neuro/Psych  (+) psychiatric history Anxiety,     GI/Hepatic/Renal/Endo - negative ROS     Musculoskeletal     Abdominal  - normal exam    Bowel sounds: normal.   Substance History - negative use     OB/GYN negative ob/gyn ROS         Other   arthritis,                      Anesthesia Plan    ASA 1     general with block     intravenous induction     Anesthetic plan, all risks, benefits, and alternatives have been provided, discussed and informed consent has been obtained with: patient.

## 2020-12-18 NOTE — OP NOTE
SHOULDER ARTHROSCOPY  Procedure Report    Patient Name:  Rashmi Diana  YOB: 1980    Date of Surgery:  12/18/2020     Indications: This is a 40 y.o. female with pain to the right after undergoing previous cuff repair shoulder.  Workup demonstrated adhesive capsulitis with intact cuff. Treatment options were discussed.  They desired to proceed with shoulder arthroscopy with capsular release after discussing the risks including bleeding, scarring, infection, stiffness, nerve damage, tendon damage, recurrence, artery damage, continued pain, DVT, loss of life or limb, and a need for further surgery.      Pre-op Diagnosis:   Adhesive capsulitis of right shoulder [M75.01]       Post-op Diagnosis:    Post-Op Diagnosis Codes:     * Adhesive capsulitis of right shoulder [M75.01]    Procedure/CPT® Codes: 36038    Procedure(s):  SHOULDER ARTHROSCOPY with capsular release    Assistant: Sonny Mejia certified first assist      was responsible for performing the following activities: Retraction, Suction, Irrigation, Suturing, Closing and Placing Dressing and their skilled assistance was necessary for the success of this case.       Anesthesia: General with Block    IV fluids: See anesthesia record    Estimated Blood Loss: minimal    Implants:    Nothing was implanted during the procedure      Complications: None    Specimens:none    Description of Procedure: The patient's operative site was marked.  Regional anesthesia was administered.  They were brought to the operating room and placed  on the operating room table.  General anesthesia was administered. Antibiotics were dosed.  A timeout was taken, confirming the correct operative site and procedure.  Exam under anesthesia indicated global loss of range of motion and no instability.  They were placed in a semilateral position.  An axillary roll and SCDs were placed.  The right  shoulder was prepped and draped in the standard surgical fashion.  The shoulder was  injected with local anesthetic and was placed into traction.  A posterior portal was created.  A camera was inserted.  The glenoid chondral surface was intact.  The humerus surface was intact.  The subscapularis was covered in synovitis and adhesions.  The biceps was intact.  The labrum was intact.  The undersurface of the cuff demonstrated intact repair. A shaver was inserted.  The labrum was probed and intact intact.  The biceps was pulled into the shoulder and found to be intact .  The axillary pouch was free of loose bodies.  Thermal device was used to release the rotator interval and anterior capsule preserving the subscapularis.  The instruments were placed in the subacromial space.  A full-thickness bursectomy was performed as well as removing abundant adhesions.  The CA ligament was intact dorsal surface of the cuff indicated intact repair..  No impingement was noted.  The instruments were removed.  The wounds were closed with suture and Steri-Strips.  Manipulation demonstrated obtaining a full range of motion    30 mg of Toradol and lidocaine were injected into the deltoid  and a sterile dressing was applied to the rest of the shoulder.  They were placed in a sling and taken to the recovery room.  There were no complications.  I was present for all portions.  All counts were correct.  Good capillary refill was noted to the hand.      Dylan Valencia MD     Date: 12/18/2020  Time: 12:32 EST

## 2020-12-18 NOTE — ANESTHESIA PROCEDURE NOTES
Airway  Urgency: elective    Date/Time: 12/18/2020 11:51 AM  Difficult airway    General Information and Staff    Patient location during procedure: OR  Anesthesiologist: Ramiro Pandey MD  CRNA: Suzette Masters CRNA    Indications and Patient Condition  Indications for airway management: airway protection    Preoxygenated: yes  Mask difficulty assessment: 1 - vent by mask    Final Airway Details  Final airway type: endotracheal airway      Successful airway: ETT  Cuffed: yes   Successful intubation technique: video laryngoscopy  Facilitating devices/methods: intubating stylet  Endotracheal tube insertion site: oral  Blade: Glidescope  Blade size: 3  ETT size (mm): 7.0  Cormack-Lehane Classification: grade III - view of epiglottis only  Placement verified by: chest auscultation and capnometry   Measured from: lips  ETT/EBT  to lips (cm): 22  Number of attempts at approach: 2  Assessment: lips, teeth, and gum same as pre-op and atraumatic intubation

## 2020-12-21 ENCOUNTER — OFFICE VISIT (OUTPATIENT)
Dept: ORTHOPEDIC SURGERY | Facility: CLINIC | Age: 40
End: 2020-12-21

## 2020-12-21 ENCOUNTER — TREATMENT (OUTPATIENT)
Dept: PHYSICAL THERAPY | Facility: CLINIC | Age: 40
End: 2020-12-21

## 2020-12-21 VITALS
SYSTOLIC BLOOD PRESSURE: 105 MMHG | HEIGHT: 62 IN | HEART RATE: 103 BPM | DIASTOLIC BLOOD PRESSURE: 72 MMHG | WEIGHT: 138 LBS | BODY MASS INDEX: 25.4 KG/M2

## 2020-12-21 DIAGNOSIS — Z47.89 ORTHOPEDIC AFTERCARE: Primary | ICD-10-CM

## 2020-12-21 DIAGNOSIS — M25.511 ACUTE PAIN OF RIGHT SHOULDER: Primary | ICD-10-CM

## 2020-12-21 PROCEDURE — 97110 THERAPEUTIC EXERCISES: CPT | Performed by: PHYSICAL THERAPIST

## 2020-12-21 PROCEDURE — 97140 MANUAL THERAPY 1/> REGIONS: CPT | Performed by: PHYSICAL THERAPIST

## 2020-12-21 PROCEDURE — 99024 POSTOP FOLLOW-UP VISIT: CPT | Performed by: ORTHOPAEDIC SURGERY

## 2020-12-21 NOTE — PROGRESS NOTES
"     Patient ID: Rashmi Diana is a 40 y.o. female.  12/18/20 right shoulder arthroscopy capsular release  Pain minimal      Objective:    /72   Pulse 103   Ht 157.5 cm (62\")   Wt 62.6 kg (138 lb)   LMP 11/27/2020   BMI 25.24 kg/m²     Physical Examination:     Wounds are well approximated without infection.  Sensory and motor exam are intact all distributions. Radial pulse is palpable and capillary refill is less than two seconds to all digits    Imaging:      Assessment:  Doing after capsular release    Plan:  Wounds were cleaned and redressed. Restrictions discussed.  Begin therapy and see me in 4 weeks.  Remove dressings in two weeks    Procedures  "

## 2020-12-21 NOTE — PROGRESS NOTES
Physical Therapy Daily Progress Note      Patient: Rashmi Diana   : 1980  Diagnosis/ICD-10 Code:  Acute pain of right shoulder [M25.511]   Problems Addressed this Visit     None      Visit Diagnoses     Acute pain of right shoulder    -  Primary      Diagnoses       Codes Comments    Acute pain of right shoulder    -  Primary ICD-10-CM: M25.511  ICD-9-CM: 719.41          Referring practitioner: Dylan Valencia, *  Date of Initial Visit: Type: THERAPY  Noted: 2020  Today's Date: 2020    VISIT#: 2    Subjective Patient reports feeling a little stiff this morning, has been performing active stretches at home.       Objective passive flexion to 170 degrees, abduction 140 degrees with no scapular winging. ER to 60 degrees    See Exercise, Manual, and Modality Logs for complete treatment.     Assessment/Plan Patient maintaining good passive flexion with no scapular winging noted. Patient progressing well at this time. Patient provided proper return demonstration with active stretches for HEP.    Progress per Plan of Care         Timed:         Manual Therapy:    15     mins  25905;     Therapeutic Exercise:    10     mins  82268;     Neuromuscular Mallorie:        mins  81958;    Therapeutic Activity:          mins  66550;     Gait Training:           mins  50303;     Ultrasound:          mins  08023;    Ionto                                   mins   80780  Canalith Repos                   mins  04707    Un-Timed:  Electrical Stimulation:         mins  73803 ( );  Dry Needling          mins self-pay  Traction          mins 39080    Timed Treatment:   25   mins   Total Treatment:     35   mins    Bulmaro Monique PTA  Physical Therapist

## 2020-12-21 NOTE — PATIENT INSTRUCTIONS
Shoulder Arthroscopy: Post- Operative Visit Objectives    1) Review the operative findings, procedures and photos.  2) Make sure medications are effective and not causing problems.  a) Pain: Oxycodone or hydrocodone is for pain. You may take 1 tablet every 6 hours as necessary.  Some patients don’t require the use of these…in those circumstances just use Tylenol extra-strength 1 or 2 tablets every 4-6 hours.   b) Naproxen 500 mg. For pain and inflammation.  You should take 1 every twice a day.  Do not use Aleve, Ibuprofen, Motrin or Advil during this time.  If you have had any problems stop taking these medicines and please tell us!  c) Keflex (cephalexin). This is an antibiotic to be taken as a preventive medicine.  Take 1 pill every 6 hours for 1 day. If you have a penicillin allergy this will be replaced by other options.  3) Wound Care:  a) Today we will change your dressings and cover your wounds with a plastic covering called Tegaderm. This will allow you to shower immediately.  Remove the tegaderm and underlying dressings in two weeks then ok to get wet in a shower. No Baths or swimming until 4 weeks after surgery.  Keep a towel on the dressing while applying ice.  4) Exercises and Physical Therapy Remember the protocol is 3 phases:  a) Healing (6 wks)  Continue the use of the sling for 6 weeks; depending on procedure utilized this may be shorter. You can do gentle range of motion exercise of the elbow and wrist immediately with the arm at the side.  Formal physical therapy will usually start in two weeks.    b) Rehabilitative (6 wks) You begin a more aggressive phase of physical therapy at the 6 week angel. Light strengthening and a continued emphasis on protecting the repair are important at this stage.  c) Restorative (4 wks)  Back to your sports and job activities gradually   5) Follow Up appointments Schedule Follow up visits as directed usually in 4 weeks. Physical therapy will be ordered today and you  should receive a phone call or can schedule yourself if appropriate.  6) Notes  Make sure you have all necessary notes and documentation for school or work.  7) Issues: Remember our goal is to make this process smooth and easy if there is any thing you need please ask us or call back [phone]  8)

## 2020-12-22 ENCOUNTER — TREATMENT (OUTPATIENT)
Dept: PHYSICAL THERAPY | Facility: CLINIC | Age: 40
End: 2020-12-22

## 2020-12-22 DIAGNOSIS — S46.011D TRAUMATIC TEAR OF RIGHT ROTATOR CUFF, SUBSEQUENT ENCOUNTER: ICD-10-CM

## 2020-12-22 DIAGNOSIS — S46.811D TRAUMATIC TEAR OF SUPRASPINATUS TENDON OF RIGHT SHOULDER, SUBSEQUENT ENCOUNTER: ICD-10-CM

## 2020-12-22 DIAGNOSIS — M25.511 ACUTE PAIN OF RIGHT SHOULDER: Primary | ICD-10-CM

## 2020-12-22 PROCEDURE — 97140 MANUAL THERAPY 1/> REGIONS: CPT | Performed by: PHYSICAL THERAPIST

## 2020-12-22 PROCEDURE — 97110 THERAPEUTIC EXERCISES: CPT | Performed by: PHYSICAL THERAPIST

## 2020-12-22 NOTE — PROGRESS NOTES
Physical Therapy Daily Progress Note      Patient: Rashmi Diana   : 1980  Diagnosis/ICD-10 Code:  Acute pain of right shoulder [M25.511]   Problems Addressed this Visit        Musculoskeletal and Integument    Traumatic tear of supraspinatus tendon of right shoulder      Other Visit Diagnoses     Acute pain of right shoulder    -  Primary    Traumatic tear of right rotator cuff, subsequent encounter          Diagnoses       Codes Comments    Acute pain of right shoulder    -  Primary ICD-10-CM: M25.511  ICD-9-CM: 719.41     Traumatic tear of supraspinatus tendon of right shoulder, subsequent encounter     ICD-10-CM: S46.811D  ICD-9-CM: V58.89, 840.6     Traumatic tear of right rotator cuff, subsequent encounter     ICD-10-CM: S46.011D  ICD-9-CM: V58.89, 840.4         Referring practitioner: Dylan Valencia, *  Date of Initial Visit: Type: THERAPY  Noted: 2020  Today's Date: 2020    VISIT#: 3    Subjective : Pt states her shoulder is feeling pretty good, stiff today. States she slept in a little and did not have time to use her pulleys at home yet. Having tightness at anterior shoulder. Pt states her goal is to fix her hair without difficulty.      Objective : PROM flex = 160, abd = 145, ER = 60. Added gentle strengthening ther ex. Pt with difficulty relaxing lats during flex, improved with cues to perform scap retraction.    See Exercise, Manual, and Modality Logs for complete treatment.     Assessment/Plan : Good PROM flex and abd with cues for scap awareness. Will benefit from continued PT to improve ROM and progress strengthening for improved overhead functional use of R UE.    Progress per Plan of Care and Progress strengthening /stabilization /functional activity         Timed:         Manual Therapy:    15     mins  11108;     Therapeutic Exercise:    25     mins  75901;     Neuromuscular Mallorie:        mins  12849;    Therapeutic Activity:          mins  03796;     Gait Training:            mins  86318;     Ultrasound:          mins  62521;    Ionto                                   mins   07630  Self Care                            mins   96877  Canalith Repos                   mins  56200    Un-Timed:  Electrical Stimulation:         mins  79526 ( );  Dry Needling          mins self-pay  Traction          mins 60034  Low Eval          Mins  16941  Mod Eval          Mins  66025  High Eval                            Mins  41232  Re-Eval                               mins  46490    Timed Treatment:   40   mins   Total Treatment:     60   mins    Minnie Kathleen, PT  Physical Therapist

## 2020-12-23 ENCOUNTER — TREATMENT (OUTPATIENT)
Dept: PHYSICAL THERAPY | Facility: CLINIC | Age: 40
End: 2020-12-23

## 2020-12-23 DIAGNOSIS — Z98.890 STATUS POST SHOULDER SURGERY: Primary | ICD-10-CM

## 2020-12-23 PROCEDURE — 97140 MANUAL THERAPY 1/> REGIONS: CPT | Performed by: PHYSICAL THERAPIST

## 2020-12-23 PROCEDURE — 97110 THERAPEUTIC EXERCISES: CPT | Performed by: PHYSICAL THERAPIST

## 2020-12-23 NOTE — PROGRESS NOTES
Physical Therapy Daily Progress Note      Patient: Rashmi Diana   : 1980  Diagnosis/ICD-10 Code:  Status post shoulder surgery [Z98.890]   Problems Addressed this Visit     None      Visit Diagnoses     Status post shoulder surgery    -  Primary      Diagnoses       Codes Comments    Status post shoulder surgery    -  Primary ICD-10-CM: Z98.890  ICD-9-CM: V45.89          Referring practitioner: Dylan Valencia, *  Date of Initial Visit: Type: THERAPY  Noted: 2020  Today's Date: 2020    VISIT#: 4    Subjective Patient reports shoulder is feeling good today and is pleased with progress thus far.       Objective  Passive flexion= 170, abd= 160, ER 65,     See Exercise, Manual, and Modality Logs for complete treatment.     Assessment/Plan Patient continues to demonstrate improved passive motion and tolerating gentle strengthening well. Patient will continue to benefit from improved base scapular strength and scapular awareness with functional use.     Progress per Plan of Care         Timed:         Manual Therapy:    15     mins  80521;     Therapeutic Exercise:    20     mins  35134;     Neuromuscular Mallorie:        mins  82187;    Therapeutic Activity:          mins  12176;     Gait Training:           mins  69223;     Ultrasound:          mins  89923;    Ionto                                   mins   03494  Canalith Repos                   mins  19991    Un-Timed:  Electrical Stimulation:         mins  75869 ( );  Dry Needling          mins self-pay  Traction          mins 73172    Timed Treatment:   35   mins   Total Treatment:     55   mins    Bulmaro Monique PTA  Physical Therapist

## 2020-12-28 ENCOUNTER — TREATMENT (OUTPATIENT)
Dept: PHYSICAL THERAPY | Facility: CLINIC | Age: 40
End: 2020-12-28

## 2020-12-28 DIAGNOSIS — S46.811D TRAUMATIC TEAR OF SUPRASPINATUS TENDON OF RIGHT SHOULDER, SUBSEQUENT ENCOUNTER: ICD-10-CM

## 2020-12-28 DIAGNOSIS — M25.511 ACUTE PAIN OF RIGHT SHOULDER: ICD-10-CM

## 2020-12-28 DIAGNOSIS — Z98.890 STATUS POST SHOULDER SURGERY: Primary | ICD-10-CM

## 2020-12-28 PROCEDURE — 97140 MANUAL THERAPY 1/> REGIONS: CPT | Performed by: PHYSICAL THERAPIST

## 2020-12-28 PROCEDURE — 97110 THERAPEUTIC EXERCISES: CPT | Performed by: PHYSICAL THERAPIST

## 2020-12-28 NOTE — PROGRESS NOTES
Physical Therapy Daily Progress Note      Patient: Rashmi Diana   : 1980  Diagnosis/ICD-10 Code:  Status post shoulder surgery [Z98.890]   Problems Addressed this Visit        Musculoskeletal and Integument    Traumatic tear of supraspinatus tendon of right shoulder      Other Visit Diagnoses     Status post shoulder surgery    -  Primary    Acute pain of right shoulder          Diagnoses       Codes Comments    Status post shoulder surgery    -  Primary ICD-10-CM: Z98.890  ICD-9-CM: V45.89     Acute pain of right shoulder     ICD-10-CM: M25.511  ICD-9-CM: 719.41     Traumatic tear of supraspinatus tendon of right shoulder, subsequent encounter     ICD-10-CM: S46.811D  ICD-9-CM: V58.89, 840.6         Referring practitioner: Dylan Valencia, *  Date of Initial Visit: Type: THERAPY  Noted: 2020  Today's Date: 2020    VISIT#: 5    Subjective : Pt reports shoulder feeling stiff today. Has been doing exercises daily. Difficulty fixing hair.      Objective : Added prone rows and ext. Added supine flex AROM (attempted in prone but too difficult).    See Exercise, Manual, and Modality Logs for complete treatment.     Assessment/Plan : Tightness at end range flexion. Achieving ~150 deg flexion supine PROM. Continued weakness in all directions. Needs continued scap and R shoulder strengthening to improve functional use of R UE.    Progress per Plan of Care         Timed:         Manual Therapy:    15     mins  67728;     Therapeutic Exercise:    25     mins  75191;     Neuromuscular Mallorie:        mins  75768;    Therapeutic Activity:          mins  74521;     Gait Training:           mins  78973;     Ultrasound:          mins  42531;    Ionto                                   mins   76625  Self Care                            mins   69197  Canalith Repos                   mins  91116    Un-Timed:  Electrical Stimulation:         mins  29994 ( );  Dry Needling          mins  self-pay  Traction          mins 48868  Low Eval          Mins  35658  Mod Eval          Mins  22245  High Eval                            Mins  63755  Re-Eval                               mins  86811    Timed Treatment:   40   mins   Total Treatment:     60   mins    Minnie Kathleen, PT  Physical Therapist

## 2020-12-29 ENCOUNTER — TREATMENT (OUTPATIENT)
Dept: PHYSICAL THERAPY | Facility: CLINIC | Age: 40
End: 2020-12-29

## 2020-12-29 DIAGNOSIS — Z98.890 STATUS POST SHOULDER SURGERY: Primary | ICD-10-CM

## 2020-12-29 DIAGNOSIS — M25.511 ACUTE PAIN OF RIGHT SHOULDER: ICD-10-CM

## 2020-12-29 PROCEDURE — 97110 THERAPEUTIC EXERCISES: CPT | Performed by: PHYSICAL THERAPIST

## 2020-12-29 PROCEDURE — 97014 ELECTRIC STIMULATION THERAPY: CPT | Performed by: PHYSICAL THERAPIST

## 2020-12-29 PROCEDURE — 97140 MANUAL THERAPY 1/> REGIONS: CPT | Performed by: PHYSICAL THERAPIST

## 2020-12-29 NOTE — PROGRESS NOTES
Physical Therapy Daily Progress Note      Patient: Rashmi Diana   : 1980  Diagnosis/ICD-10 Code:  Status post shoulder surgery [Z98.890]   Problems Addressed this Visit     None      Visit Diagnoses     Status post shoulder surgery    -  Primary    Acute pain of right shoulder          Diagnoses       Codes Comments    Status post shoulder surgery    -  Primary ICD-10-CM: Z98.890  ICD-9-CM: V45.89     Acute pain of right shoulder     ICD-10-CM: M25.511  ICD-9-CM: 719.41          Referring practitioner: Dylan Valencia, *  Date of Initial Visit: Type: THERAPY  Noted: 2020  Today's Date: 2020    VISIT#: 6    Subjective Patient reports having decreased stiffness today and is feeling a little better.       Objective Added punch plus with eccentric lowering for scapular awareness.     See Exercise, Manual, and Modality Logs for complete treatment.     Assessment/Plan Patient maintaining good passive motion with mild springy end feel. Patient demonstrating improved scapular awareness while performing therapeutic exercise. Will continue to benefit from base scapular strength     Progress strengthening /stabilization /functional activity         Timed:         Manual Therapy:    15     mins  18201;     Therapeutic Exercise:    25     mins  96743;     Neuromuscular Mallorie:        mins  47269;    Therapeutic Activity:          mins  29453;     Gait Training:          mins  66190;     Ultrasound:          mins  04590;    Ionto                                   mins   65852  Canalith Repos                   mins  33982    Un-Timed:  Electrical Stimulation:    15     mins  27509 ( );  Dry Needling          mins self-pay  Traction          mins 72275    Timed Treatment:   40   mins   Total Treatment:     65   mins    Bulmaro Monique PTA  Physical Therapist

## 2020-12-31 ENCOUNTER — TREATMENT (OUTPATIENT)
Dept: PHYSICAL THERAPY | Facility: CLINIC | Age: 40
End: 2020-12-31

## 2020-12-31 DIAGNOSIS — Z98.890 STATUS POST SHOULDER SURGERY: Primary | ICD-10-CM

## 2020-12-31 DIAGNOSIS — M25.511 ACUTE PAIN OF RIGHT SHOULDER: ICD-10-CM

## 2020-12-31 PROCEDURE — 97110 THERAPEUTIC EXERCISES: CPT | Performed by: PHYSICAL THERAPIST

## 2020-12-31 PROCEDURE — 97140 MANUAL THERAPY 1/> REGIONS: CPT | Performed by: PHYSICAL THERAPIST

## 2020-12-31 NOTE — PROGRESS NOTES
Physical Therapy Daily Progress Note      Patient: Rashmi Diana   : 1980  Diagnosis/ICD-10 Code:  Status post shoulder surgery [Z98.890]   Problems Addressed this Visit     None      Visit Diagnoses     Status post shoulder surgery    -  Primary    Acute pain of right shoulder          Diagnoses       Codes Comments    Status post shoulder surgery    -  Primary ICD-10-CM: Z98.890  ICD-9-CM: V45.89     Acute pain of right shoulder     ICD-10-CM: M25.511  ICD-9-CM: 719.41          Referring practitioner: Dylan Valencia, *  Date of Initial Visit: Type: THERAPY  Noted: 2020  Today's Date: 2020    VISIT#: 7    Subjective Patient reports feeling ok, just having continued weakness feeling in shoulder.       Objective PROM flexion 170 degrees, , Added kinesiotape for improved scapular awareness    See Exercise, Manual, and Modality Logs for complete treatment.     Assessment/Plan Patient maintaining good passive mobility, requires tactile/verbal cueing for scapular awareness while performing base scapular strengthening. Will continue to benefit from improved base scapular strength and improved scapular awareness with use.     Progress per Plan of Care         Timed:         Manual Therapy:    15     mins  71013;     Therapeutic Exercise:    30     mins  30458;     Neuromuscular Mallorie:        mins  80087;    Therapeutic Activity:          mins  24074;     Gait Training:           mins  99742;     Ultrasound:          mins  32469;    Ionto                                   mins   01713  Canalith Repos                   mins  44606    Un-Timed:  Electrical Stimulation:         mins  02344 ( );  Dry Needling          mins self-pay  Traction          mins 24632    Timed Treatment:   45   mins   Total Treatment:     65   mins    Bulmaro Monique PTA  Physical Therapist

## 2021-01-06 ENCOUNTER — TREATMENT (OUTPATIENT)
Dept: PHYSICAL THERAPY | Facility: CLINIC | Age: 41
End: 2021-01-06

## 2021-01-06 DIAGNOSIS — S46.811D TRAUMATIC TEAR OF SUPRASPINATUS TENDON OF RIGHT SHOULDER, SUBSEQUENT ENCOUNTER: ICD-10-CM

## 2021-01-06 DIAGNOSIS — Z98.890 STATUS POST SHOULDER SURGERY: Primary | ICD-10-CM

## 2021-01-06 DIAGNOSIS — M25.511 ACUTE PAIN OF RIGHT SHOULDER: ICD-10-CM

## 2021-01-06 PROCEDURE — 97140 MANUAL THERAPY 1/> REGIONS: CPT | Performed by: PHYSICAL THERAPIST

## 2021-01-06 PROCEDURE — 97110 THERAPEUTIC EXERCISES: CPT | Performed by: PHYSICAL THERAPIST

## 2021-01-06 NOTE — PROGRESS NOTES
Physical Therapy Daily Progress Note      Patient: Rashmi Diana   : 1980  Diagnosis/ICD-10 Code:  Status post shoulder surgery [Z98.890]   Problems Addressed this Visit        Other    Traumatic tear of supraspinatus tendon of right shoulder      Other Visit Diagnoses     Status post shoulder surgery    -  Primary    Acute pain of right shoulder          Diagnoses       Codes Comments    Status post shoulder surgery    -  Primary ICD-10-CM: Z98.890  ICD-9-CM: V45.89     Acute pain of right shoulder     ICD-10-CM: M25.511  ICD-9-CM: 719.41     Traumatic tear of supraspinatus tendon of right shoulder, subsequent encounter     ICD-10-CM: S46.811D  ICD-9-CM: V58.89, 840.6          Referring practitioner: Dylan Valencia, *  Date of Initial Visit: Type: THERAPY  Noted: 2020  Today's Date: 2021    VISIT#: 8    Subjective Pt stated that she is worried she isn't making progress like she hoped.  Still has trouble reaching forward or washing hair.  Stretching several times a day with pulley.      Objective Reviewed contract relax stretch using pulleys.  Added standing resisted sh flex in small range with red tband.    See Exercise, Manual, and Modality Logs for complete treatment.     Assessment/Plan Continued weakness noted into sh flex and moderate ER tightness.  Good tolerance to tband sh flex without shoulder hiking.  Difficulty with prone sh flex.      Progress strengthening /stabilization /functional activity         Timed:         Manual Therapy:    15     mins  25875;     Therapeutic Exercise:    30     mins  23744;     Neuromuscular Mallorie:        mins  20514;    Therapeutic Activity:          mins  86530;     Gait Training:           mins  16935;     Ultrasound:          mins  26560;    Ionto                                   mins   72013  Self Care                            mins   09935  Canalith Repos                   mins  33226    Un-Timed:  Electrical Stimulation:         mins  20647  ( );  Dry Needling          mins self-pay  Traction          mins 59511  Low Eval          Mins  25221  Mod Eval          Mins  78109  High Eval                            Mins  22969  Re-Eval                               mins  53814    Timed Treatment:   45   mins   Total Treatment:     65   mins    Venus Baum, PT  Physical Therapist

## 2021-01-13 ENCOUNTER — TREATMENT (OUTPATIENT)
Dept: PHYSICAL THERAPY | Facility: CLINIC | Age: 41
End: 2021-01-13

## 2021-01-13 DIAGNOSIS — S46.811D TRAUMATIC TEAR OF SUPRASPINATUS TENDON OF RIGHT SHOULDER, SUBSEQUENT ENCOUNTER: ICD-10-CM

## 2021-01-13 DIAGNOSIS — Z98.890 STATUS POST SHOULDER SURGERY: Primary | ICD-10-CM

## 2021-01-13 DIAGNOSIS — M25.511 ACUTE PAIN OF RIGHT SHOULDER: ICD-10-CM

## 2021-01-13 PROCEDURE — 97110 THERAPEUTIC EXERCISES: CPT | Performed by: PHYSICAL THERAPIST

## 2021-01-13 PROCEDURE — 97140 MANUAL THERAPY 1/> REGIONS: CPT | Performed by: PHYSICAL THERAPIST

## 2021-01-13 NOTE — PROGRESS NOTES
Physical Therapy Daily Progress Note      Patient: Rashmi Diana   : 1980  Diagnosis/ICD-10 Code:  Status post shoulder surgery [Z98.890]   Problems Addressed this Visit        Other    Traumatic tear of supraspinatus tendon of right shoulder      Other Visit Diagnoses     Status post shoulder surgery    -  Primary    Acute pain of right shoulder          Diagnoses       Codes Comments    Status post shoulder surgery    -  Primary ICD-10-CM: Z98.890  ICD-9-CM: V45.89     Acute pain of right shoulder     ICD-10-CM: M25.511  ICD-9-CM: 719.41     Traumatic tear of supraspinatus tendon of right shoulder, subsequent encounter     ICD-10-CM: S46.811D  ICD-9-CM: V58.89, 840.6         Referring practitioner: Dylan Valencia, *  Date of Initial Visit: Type: THERAPY  Noted: 2020  Today's Date: 2021    VISIT#: 9    Subjective : Pt reports that her shoulder is feeling better. Feels like her posture is better and she is hold shoulder in more natural position. Can put her hair in quick pony tail without propping elbow on counter. States red Tband is feeling too easier. States she feels like her shoulder is finally healing and is looking forward to gaining more strength so she can wash and fix her hair easier.      Objective : tightness at end range of flexion. Performed standing small range resisted flex with green Tband and pt reported good comfort.    See Exercise, Manual, and Modality Logs for complete treatment.     Assessment/Plan : Decreased pain at start of session. Good tolerance to all ther ex with no increase in symptoms. Pt demonstrating improved posture with relaxed position of R shoulder and UE during ambulation and mobility vs guarded position pt had been exhibiting frequently. Mod limitations in ext rotation ROM. Good scap awareness during ther ex with no shoulder hiking noted. Prone flex challenging.    Progress per Plan of Care         Timed:         Manual Therapy:    10     mins   34999;     Therapeutic Exercise:    20     mins  09187;     Neuromuscular Mallorie:        mins  43754;    Therapeutic Activity:          mins  78034;     Gait Training:           mins  80687;     Ultrasound:          mins  43398;    Ionto                                   mins   69235  Self Care                            mins   41436  Canalith Repos                   mins  97970    Un-Timed:  Electrical Stimulation:         mins  73021 ( );  Dry Needling          mins self-pay  Traction          mins 43931  Low Eval          Mins  23213  Mod Eval          Mins  08445  High Eval                            Mins  88321  Re-Eval                               mins  47424    Timed Treatment:   30   mins   Total Treatment:     55   mins    Minnie Kathleen, PT  Physical Therapist

## 2021-01-15 ENCOUNTER — TREATMENT (OUTPATIENT)
Dept: PHYSICAL THERAPY | Facility: CLINIC | Age: 41
End: 2021-01-15

## 2021-01-15 DIAGNOSIS — M25.511 ACUTE PAIN OF RIGHT SHOULDER: ICD-10-CM

## 2021-01-15 DIAGNOSIS — Z98.890 STATUS POST SHOULDER SURGERY: Primary | ICD-10-CM

## 2021-01-15 DIAGNOSIS — S46.811D TRAUMATIC TEAR OF SUPRASPINATUS TENDON OF RIGHT SHOULDER, SUBSEQUENT ENCOUNTER: ICD-10-CM

## 2021-01-15 PROCEDURE — 97140 MANUAL THERAPY 1/> REGIONS: CPT | Performed by: PHYSICAL THERAPIST

## 2021-01-15 PROCEDURE — 97110 THERAPEUTIC EXERCISES: CPT | Performed by: PHYSICAL THERAPIST

## 2021-01-15 NOTE — PROGRESS NOTES
Physical Therapy Daily Progress Note      Patient: Rashmi Diana   : 1980  Diagnosis/ICD-10 Code:  Status post shoulder surgery [Z98.890]   Problems Addressed this Visit        Other    Traumatic tear of supraspinatus tendon of right shoulder      Other Visit Diagnoses     Status post shoulder surgery    -  Primary    Acute pain of right shoulder          Diagnoses       Codes Comments    Status post shoulder surgery    -  Primary ICD-10-CM: Z98.890  ICD-9-CM: V45.89     Acute pain of right shoulder     ICD-10-CM: M25.511  ICD-9-CM: 719.41     Traumatic tear of supraspinatus tendon of right shoulder, subsequent encounter     ICD-10-CM: S46.811D  ICD-9-CM: V58.89, 840.6          Referring practitioner: Dylan Valencia, *  Date of Initial Visit: Type: THERAPY  Noted: 2020  Today's Date: 1/15/2021    VISIT#: 10    Subjective Most soreness at lateral shoulder.  Pleased with improving mobility and gradually improving strength.      Objective Added pulley IR stretch.  Inc flexion motion with green tband resisted sh flex.      PROM R sh flex 150 (painful at end range), ER 40 (tight at end range)  AROM R sh flex 80 without shrugging, active ER in sidelying to neutral (feels tight and weak)    See Exercise, Manual, and Modality Logs for complete treatment.     Assessment/Plan Good passive mobility, improving active mobility with dec shrugging.  Continued shrugging/weakness noted at 80+ deg sh flexion.  Plan to discuss MD follow up.  Pt would benefit from continued work on strength and improving active mobility.      Progress strengthening /stabilization /functional activity         Timed:         Manual Therapy:    15     mins  74030;     Therapeutic Exercise:    30     mins  27854;     Neuromuscular Mallorie:        mins  07028;    Therapeutic Activity:          mins  77306;     Gait Training:           mins  91527;     Ultrasound:          mins  17584;    Ionto                                   mins    52084  Self Care                            mins   45796  Canalith Repos                   mins  33707    Un-Timed:  Electrical Stimulation:         mins  23505 ( );  Dry Needling          mins self-pay  Traction          mins 53881  Low Eval          Mins  35739  Mod Eval          Mins  02573  High Eval                            Mins  15584  Re-Eval                               mins  25531    Timed Treatment:   45   mins   Total Treatment:     65   mins    Venus Baum PT  Physical Therapist

## 2021-01-18 ENCOUNTER — OFFICE VISIT (OUTPATIENT)
Dept: ORTHOPEDIC SURGERY | Facility: CLINIC | Age: 41
End: 2021-01-18

## 2021-01-18 VITALS
SYSTOLIC BLOOD PRESSURE: 122 MMHG | WEIGHT: 138 LBS | BODY MASS INDEX: 25.4 KG/M2 | HEART RATE: 101 BPM | DIASTOLIC BLOOD PRESSURE: 66 MMHG | HEIGHT: 62 IN

## 2021-01-18 DIAGNOSIS — Z47.89 ORTHOPEDIC AFTERCARE: Primary | ICD-10-CM

## 2021-01-18 PROCEDURE — 99024 POSTOP FOLLOW-UP VISIT: CPT | Performed by: ORTHOPAEDIC SURGERY

## 2021-01-18 NOTE — PROGRESS NOTES
"     Patient ID: Rashmi Diana is a 40 y.o. female.    12/18/20 right shoulder arthroscopy capsular release  Feels like her stiffness is starting to return, also dealing with constipation.  Is off of her pain medication    Objective:    /66   Pulse 101   Ht 157.5 cm (62\")   Wt 62.6 kg (138 lb)   BMI 25.24 kg/m²     Physical Examination:    Incisions are healed.  Passive elevation 160 degrees abduction 90 degrees external Tatian 20 degrees    Imaging:      Assessment:  Recurrent stiffness after cuff repair and capsular release    Plan:  Continue home exercises and physical therapy.  Would add a stool softener.  See me in 6 weeks  Consider injection at that time  "

## 2021-01-20 ENCOUNTER — TREATMENT (OUTPATIENT)
Dept: PHYSICAL THERAPY | Facility: CLINIC | Age: 41
End: 2021-01-20

## 2021-01-20 DIAGNOSIS — Z98.890 STATUS POST SHOULDER SURGERY: Primary | ICD-10-CM

## 2021-01-20 PROCEDURE — 97140 MANUAL THERAPY 1/> REGIONS: CPT | Performed by: PHYSICAL THERAPIST

## 2021-01-20 PROCEDURE — 97110 THERAPEUTIC EXERCISES: CPT | Performed by: PHYSICAL THERAPIST

## 2021-01-20 NOTE — PROGRESS NOTES
Physical Therapy Daily Progress Note      Patient: Rashmi Diana   : 1980  Diagnosis/ICD-10 Code:  Status post shoulder surgery [Z98.890]   Problems Addressed this Visit     None      Visit Diagnoses     Status post shoulder surgery    -  Primary      Diagnoses       Codes Comments    Status post shoulder surgery    -  Primary ICD-10-CM: Z98.890  ICD-9-CM: V45.89          Referring practitioner: Dylan Valencia, *  Date of Initial Visit: Type: THERAPY  Noted: 2020  Today's Date: 2021    VISIT#: 11    Subjective Patient reports noticing some improvements with use, just continues to have mild elbow discomfort and tightness with rotation.       Objective focused on ER stretch and IR which continues to be limiting factors for improved active use.     See Exercise, Manual, and Modality Logs for complete treatment.     Assessment/Plan Patient responded well to added seated ER stretch and tolerated all performed therapeutic exercise. Continued limitations with ER and IR remain evident limiting active motion and ability to perform ADLS.     Progress strengthening /stabilization /functional activity         Timed:         Manual Therapy:    15     mins  16530;     Therapeutic Exercise:    30     mins  76449;     Neuromuscular Mallorie:        mins  57141;    Therapeutic Activity:          mins  61941;     Gait Training:           mins  07839;     Ultrasound:          mins  28099;    Ionto                                  mins   07811  Canalith Repos                   mins  18308    Un-Timed:  Electrical Stimulation:         mins  30410 ( );  Dry Needling          mins self-pay  Traction          mins 08294    Timed Treatment:   45   mins   Total Treatment:     65   mins    Bulmaro Monique PTA  Physical Therapist

## 2021-01-22 ENCOUNTER — TREATMENT (OUTPATIENT)
Dept: PHYSICAL THERAPY | Facility: CLINIC | Age: 41
End: 2021-01-22

## 2021-01-22 DIAGNOSIS — M25.511 ACUTE PAIN OF RIGHT SHOULDER: ICD-10-CM

## 2021-01-22 DIAGNOSIS — S46.811D TRAUMATIC TEAR OF SUPRASPINATUS TENDON OF RIGHT SHOULDER, SUBSEQUENT ENCOUNTER: ICD-10-CM

## 2021-01-22 DIAGNOSIS — Z98.890 STATUS POST SHOULDER SURGERY: Primary | ICD-10-CM

## 2021-01-22 PROCEDURE — 97110 THERAPEUTIC EXERCISES: CPT | Performed by: PHYSICAL THERAPIST

## 2021-01-22 PROCEDURE — 97140 MANUAL THERAPY 1/> REGIONS: CPT | Performed by: PHYSICAL THERAPIST

## 2021-01-22 NOTE — PROGRESS NOTES
Physical Therapy Daily Progress Note      Patient: Rashmi Diana   : 1980  Diagnosis/ICD-10 Code:  Status post shoulder surgery [Z98.890]   Problems Addressed this Visit        Other    Traumatic tear of supraspinatus tendon of right shoulder      Other Visit Diagnoses     Status post shoulder surgery    -  Primary    Acute pain of right shoulder          Diagnoses       Codes Comments    Status post shoulder surgery    -  Primary ICD-10-CM: Z98.890  ICD-9-CM: V45.89     Acute pain of right shoulder     ICD-10-CM: M25.511  ICD-9-CM: 719.41     Traumatic tear of supraspinatus tendon of right shoulder, subsequent encounter     ICD-10-CM: S46.811D  ICD-9-CM: V58.89, 840.6         Referring practitioner: Dylan Valencia, *  Date of Initial Visit: Type: THERAPY  Noted: 2020  Today's Date: 2021    VISIT#: 12    Subjective : Pt states continued pain localized to front of shoulder. States she feels stronger and that her movements are becoming more normal but still weak above shoulder height and difficulty reaching back of head.      Objective : Instructed pt in use of belt to perform AAROM to move hand behind head. Pt demonstrated good understanding. Added AAROM to full flex with good form and then eccentric lower. Weakness evident with trembling noted but pt demonstrating good ability and understanding.  Increased wt on supine press-up, mid rows, and lat pull downs. Added AAROM to full flex then focus on eccentric lower. Pt demonstrated good understanding of this.  See Exercise, Manual, and Modality Logs for complete treatment.     Assessment/Plan : Tightness at start of session, improved with stretching and manual techniques. Pt has made good strength gains in strength in low ranges and up to ~90 deg flexion. Weakness continues in high ranges. Pt will benefit from continued strengthening and shoulder stability ther ex to improve functional use of R UE with overhead tasks.    Progress per Plan  of Care         Timed:         Manual Therapy:    15     mins  62098;     Therapeutic Exercise:    30     mins  12054;     Neuromuscular Mallorie:        mins  71694;    Therapeutic Activity:          mins  34414;     Gait Training:           mins  85785;     Ultrasound:          mins  69455;    Ionto                                   mins   20523  Self Care                            mins   22064  Canalith Repos                   mins  35027    Un-Timed:  Electrical Stimulation:         mins  44868 ( );  Dry Needling          mins self-pay  Traction          mins 97684  Low Eval          Mins  28603  Mod Eval          Mins  07862  High Eval                            Mins  00302  Re-Eval                               mins  53654    Timed Treatment:   45   mins   Total Treatment:     65   mins    Minnie Kathleen, PT  Physical Therapist

## 2021-01-27 ENCOUNTER — TREATMENT (OUTPATIENT)
Dept: PHYSICAL THERAPY | Facility: CLINIC | Age: 41
End: 2021-01-27

## 2021-01-27 DIAGNOSIS — Z98.890 STATUS POST SHOULDER SURGERY: Primary | ICD-10-CM

## 2021-01-27 PROCEDURE — 97110 THERAPEUTIC EXERCISES: CPT | Performed by: PHYSICAL THERAPIST

## 2021-01-27 PROCEDURE — 97140 MANUAL THERAPY 1/> REGIONS: CPT | Performed by: PHYSICAL THERAPIST

## 2021-01-27 NOTE — PROGRESS NOTES
Physical Therapy Daily Progress Note      Patient: Rashmi Diana   : 1980  Diagnosis/ICD-10 Code:  Status post shoulder surgery [Z98.890]   Problems Addressed this Visit     None      Visit Diagnoses     Status post shoulder surgery    -  Primary      Diagnoses       Codes Comments    Status post shoulder surgery    -  Primary ICD-10-CM: Z98.890  ICD-9-CM: V45.89          Referring practitioner: Dylan Valencia, *  Date of Initial Visit: Type: THERAPY  Noted: 2020  Today's Date: 2021    VISIT#: 13    Subjective Patient reports feeling continued ER tightness but is noticing improved active flexion.       Objective active flexion to 100 degrees.     See Exercise, Manual, and Modality Logs for complete treatment.     Assessment/Plan Patient continues to demonstrate shaking unsteadiness with flexion above 90 degrees. Patient will continue to benefit from improved base scapular strengthening for improved stability with flexion above 90 degrees and improved ER for improved functional mobility.     Progress strengthening /stabilization /functional activity         Timed:         Manual Therapy:    15     mins  21571;     Therapeutic Exercise:    30     mins  61543;     Neuromuscular Mallorie:        mins  03948;    Therapeutic Activity:          mins  11164;     Gait Training:           mins  03708;     Ultrasound:          mins  79516;    Ionto                                   mins   95512  Canalith Repos                   mins  96259    Un-Timed:  Electrical Stimulation:         mins  34472 ( );  Dry Needling          mins self-pay  Traction          mins 38948    Timed Treatment:   45   mins   Total Treatment:     65   mins    Bulmaro Monique PTA  Physical Therapist

## 2021-02-01 ENCOUNTER — TREATMENT (OUTPATIENT)
Dept: PHYSICAL THERAPY | Facility: CLINIC | Age: 41
End: 2021-02-01

## 2021-02-01 DIAGNOSIS — M25.511 ACUTE PAIN OF RIGHT SHOULDER: ICD-10-CM

## 2021-02-01 DIAGNOSIS — Z98.890 STATUS POST SHOULDER SURGERY: Primary | ICD-10-CM

## 2021-02-01 PROCEDURE — 97110 THERAPEUTIC EXERCISES: CPT | Performed by: PHYSICAL THERAPIST

## 2021-02-01 PROCEDURE — 97140 MANUAL THERAPY 1/> REGIONS: CPT | Performed by: PHYSICAL THERAPIST

## 2021-02-01 NOTE — PROGRESS NOTES
Physical Therapy Daily Progress Note      Patient: Rashmi Diana   : 1980  Diagnosis/ICD-10 Code:  Status post shoulder surgery [Z98.890]   Problems Addressed this Visit     None      Visit Diagnoses     Status post shoulder surgery    -  Primary    Acute pain of right shoulder          Diagnoses       Codes Comments    Status post shoulder surgery    -  Primary ICD-10-CM: Z98.890  ICD-9-CM: V45.89     Acute pain of right shoulder     ICD-10-CM: M25.511  ICD-9-CM: 719.41          Referring practitioner: Dylan Valencia, *  Date of Initial Visit: Type: THERAPY  Noted: 2020  Today's Date: 2021    VISIT#: 14    Subjective Patient reports feeling a little stronger and is pleased with progress at this time.       Objective  Limited ER to 50 degrees, flexion and abduction WFL    See Exercise, Manual, and Modality Logs for complete treatment.     Assessment/Plan Patient required less assistance to complete eccentric lowering with decent scapular mechanics. Patient continues to progress well towards goals at this time demonstrating improved scapular stability.     Progress strengthening /stabilization /functional activity         Timed:         Manual Therapy:    15     mins  59099;     Therapeutic Exercise:    30     mins  10190;     Neuromuscular Mallorie:        mins  81025;    Therapeutic Activity:          mins  60942;     Gait Training:           mins  06434;     Ultrasound:          mins  70712;    Ionto                                   mins   93196  Canalith Repos                   mins  35572    Un-Timed:  Electrical Stimulation:         mins  12989 ( );  Dry Needling          mins self-pay  Traction          mins 18349    Timed Treatment:   45   mins   Total Treatment:     65   mins    Bulmaro Monique PTA  Physical Therapist

## 2021-02-04 ENCOUNTER — TREATMENT (OUTPATIENT)
Dept: PHYSICAL THERAPY | Facility: CLINIC | Age: 41
End: 2021-02-04

## 2021-02-04 DIAGNOSIS — M25.511 ACUTE PAIN OF RIGHT SHOULDER: ICD-10-CM

## 2021-02-04 DIAGNOSIS — Z98.890 STATUS POST SHOULDER SURGERY: Primary | ICD-10-CM

## 2021-02-04 PROCEDURE — 97140 MANUAL THERAPY 1/> REGIONS: CPT | Performed by: PHYSICAL THERAPIST

## 2021-02-04 PROCEDURE — 97110 THERAPEUTIC EXERCISES: CPT | Performed by: PHYSICAL THERAPIST

## 2021-02-04 NOTE — PROGRESS NOTES
Physical Therapy Daily Progress Note      Patient: Rashmi Diana   : 1980  Diagnosis/ICD-10 Code:  Status post shoulder surgery [Z98.890]   Problems Addressed this Visit     None      Visit Diagnoses     Status post shoulder surgery    -  Primary    Acute pain of right shoulder          Diagnoses       Codes Comments    Status post shoulder surgery    -  Primary ICD-10-CM: Z98.890  ICD-9-CM: V45.89     Acute pain of right shoulder     ICD-10-CM: M25.511  ICD-9-CM: 719.41          Referring practitioner: Dylan Valencia, *  Date of Initial Visit: Type: THERAPY  Noted: 2020  Today's Date: 2021    VISIT#: 15    Subjective Patient reports feeling good and is pleased with progress noticing improved ability to raise arm, without increased pain.       Objective active flexion to 140 degrees with good scapular mechanics.     See Exercise, Manual, and Modality Logs for complete treatment.     Assessment/Plan Patient demonstrating improved active flexion without UT over activation. Patient progressing well at this time with only limitation with ER at 50 degrees. Patient continues to make gradual progress towards goals and will continue to benefit from improved base scapular strengthening.     Progress strengthening /stabilization /functional activity         Timed:         Manual Therapy:    15     mins  94861;     Therapeutic Exercise:    30     mins  94112;     Neuromuscular Mallorie:        mins  13017;    Therapeutic Activity:          mins  64581;     Gait Training:           mins  59527;     Ultrasound:          mins  06728;    Ionto                                   mins   98303  Canalith Repos                   mins  91250    Un-Timed:  Electrical Stimulation:         mins  87647 (MC );  Dry Needling          mins self-pay  Traction          mins 64957    Timed Treatment:   45   mins   Total Treatment:     65   mins    Bulmaro Monique PTA  Physical Therapist

## 2021-02-08 ENCOUNTER — TREATMENT (OUTPATIENT)
Dept: PHYSICAL THERAPY | Facility: CLINIC | Age: 41
End: 2021-02-08

## 2021-02-08 DIAGNOSIS — M25.511 ACUTE PAIN OF RIGHT SHOULDER: ICD-10-CM

## 2021-02-08 DIAGNOSIS — Z98.890 STATUS POST SHOULDER SURGERY: Primary | ICD-10-CM

## 2021-02-08 PROCEDURE — 97140 MANUAL THERAPY 1/> REGIONS: CPT | Performed by: PHYSICAL THERAPIST

## 2021-02-08 PROCEDURE — 97110 THERAPEUTIC EXERCISES: CPT | Performed by: PHYSICAL THERAPIST

## 2021-02-08 NOTE — PROGRESS NOTES
Physical Therapy Daily Progress Note      Patient: Rashmi Diana   : 1980  Diagnosis/ICD-10 Code:  Status post shoulder surgery [Z98.890]   Problems Addressed this Visit     None      Visit Diagnoses     Status post shoulder surgery    -  Primary    Acute pain of right shoulder          Diagnoses       Codes Comments    Status post shoulder surgery    -  Primary ICD-10-CM: Z98.890  ICD-9-CM: V45.89     Acute pain of right shoulder     ICD-10-CM: M25.511  ICD-9-CM: 719.41          Referring practitioner: Dylan Valencia, *  Date of Initial Visit: Type: THERAPY  Noted: 2020  Today's Date: 2021    VISIT#: 16    Subjective  Patient reports biggest difficulty remains with strength and mobility to groom hair. Is noticing improved active flexion but continued limited active ER.       Objective      See Exercise, Manual, and Modality Logs for complete treatment.     Assessment/Plan Patient continues to demonstrate improved active flexion to 130 degrees without UT hiking. Patient remains limited with active ER and scaption. Patient provided proper return demonstration with reviewed ER stretch. Patient will continue to benefit from improved base scapular strengthening for improved glenohumeral stability with functional use.     Progress per Plan of Care         Timed:         Manual Therapy:    15     mins  14504;     Therapeutic Exercise:    30     mins  25169;     Neuromuscular Mallorie:        mins  98200;    Therapeutic Activity:          mins  99883;     Gait Training:           mins  28582;     Ultrasound:          mins  94394;    Ionto                                   mins   89901  Canalith Repos                   mins  76603    Un-Timed:  Electrical Stimulation:         mins  73649 (MC );  Dry Needling         mins self-pay  Traction          mins 90752    Timed Treatment:   45   mins   Total Treatment:     65   mins    Bulmaro Monique PTA  Physical Therapist

## 2021-02-17 ENCOUNTER — TREATMENT (OUTPATIENT)
Dept: PHYSICAL THERAPY | Facility: CLINIC | Age: 41
End: 2021-02-17

## 2021-02-17 DIAGNOSIS — M25.511 ACUTE PAIN OF RIGHT SHOULDER: ICD-10-CM

## 2021-02-17 DIAGNOSIS — Z98.890 STATUS POST SHOULDER SURGERY: Primary | ICD-10-CM

## 2021-02-17 PROCEDURE — 97110 THERAPEUTIC EXERCISES: CPT | Performed by: PHYSICAL THERAPIST

## 2021-02-17 PROCEDURE — 97140 MANUAL THERAPY 1/> REGIONS: CPT | Performed by: PHYSICAL THERAPIST

## 2021-02-17 NOTE — PROGRESS NOTES
Physical Therapy Daily Progress Note      Patient: Rashmi Diana   : 1980  Diagnosis/ICD-10 Code:  Status post shoulder surgery [Z98.890]   Problems Addressed this Visit     None      Visit Diagnoses     Status post shoulder surgery    -  Primary    Acute pain of right shoulder          Diagnoses       Codes Comments    Status post shoulder surgery    -  Primary ICD-10-CM: Z98.890  ICD-9-CM: V45.89     Acute pain of right shoulder     ICD-10-CM: M25.511  ICD-9-CM: 719.41          Referring practitioner: Dylan Valencia, *  Date of Initial Visit: Type: THERAPY  Noted: 2020  Today's Date: 2021    VISIT#: 17    Subjective Patient reports feeling some increased tightness with flexion, having difficulty with getting as good of a stretch at home, compared to in clinic.       Objective began with MHP, followed by manual interventions, finishing with therapeutic exercise     See Exercise, Manual, and Modality Logs for complete treatment.     Assessment/Plan Patient with continued ER tightness, improved post manual stretch. Patient demonstrating improved scaption motion with decreased UT overactivation. Patient will continue to benefit from improved shoulder mobility and base scapular strengthening.     Progress per Plan of Care         Timed:         Manual Therapy:    15     mins  16958;     Therapeutic Exercise:    30     mins  09926;     Neuromuscular Mallorie:        mins  36500;    Therapeutic Activity:          mins  24859;     Gait Training:           mins  40588;     Ultrasound:          mins  96202;    Ionto                                   mins   62007  Canalith Repos                   mins  22110    Un-Timed:  Electrical Stimulation:         mins  19414 (MC );  Dry Needling          mins self-pay  Traction          mins 02868    Timed Treatment:   45   mins   Total Treatment:     65   mins    Bulmaro Monique PTA  Physical Therapist

## 2021-02-22 ENCOUNTER — TREATMENT (OUTPATIENT)
Dept: PHYSICAL THERAPY | Facility: CLINIC | Age: 41
End: 2021-02-22

## 2021-02-22 ENCOUNTER — OFFICE VISIT (OUTPATIENT)
Dept: ORTHOPEDIC SURGERY | Facility: CLINIC | Age: 41
End: 2021-02-22

## 2021-02-22 VITALS
HEIGHT: 62 IN | WEIGHT: 142 LBS | HEART RATE: 98 BPM | DIASTOLIC BLOOD PRESSURE: 74 MMHG | SYSTOLIC BLOOD PRESSURE: 117 MMHG | BODY MASS INDEX: 26.13 KG/M2

## 2021-02-22 DIAGNOSIS — Z98.890 STATUS POST SHOULDER SURGERY: Primary | ICD-10-CM

## 2021-02-22 DIAGNOSIS — M75.01 ADHESIVE CAPSULITIS OF RIGHT SHOULDER: ICD-10-CM

## 2021-02-22 DIAGNOSIS — Z47.89 ORTHOPEDIC AFTERCARE: Primary | ICD-10-CM

## 2021-02-22 DIAGNOSIS — M25.511 ACUTE PAIN OF RIGHT SHOULDER: ICD-10-CM

## 2021-02-22 PROCEDURE — 97140 MANUAL THERAPY 1/> REGIONS: CPT | Performed by: PHYSICAL THERAPIST

## 2021-02-22 PROCEDURE — 99024 POSTOP FOLLOW-UP VISIT: CPT | Performed by: ORTHOPAEDIC SURGERY

## 2021-02-22 PROCEDURE — 97110 THERAPEUTIC EXERCISES: CPT | Performed by: PHYSICAL THERAPIST

## 2021-02-22 RX ORDER — SUMATRIPTAN 50 MG/1
TABLET, FILM COATED ORAL
COMMUNITY
Start: 2021-02-18

## 2021-02-22 RX ORDER — MULTIVIT WITH MINERALS/LUTEIN
250 TABLET ORAL DAILY
COMMUNITY

## 2021-02-22 RX ORDER — MELATONIN
1000 DAILY
COMMUNITY

## 2021-02-22 RX ORDER — TRIAMCINOLONE ACETONIDE 40 MG/ML
80 INJECTION, SUSPENSION INTRA-ARTICULAR; INTRAMUSCULAR ONCE
Status: COMPLETED | OUTPATIENT
Start: 2021-02-22 | End: 2021-02-22

## 2021-02-22 RX ADMIN — TRIAMCINOLONE ACETONIDE 80 MG: 40 INJECTION, SUSPENSION INTRA-ARTICULAR; INTRAMUSCULAR at 14:01

## 2021-02-22 NOTE — PROGRESS NOTES
"     Patient ID: Rashmi Diana is a 40 y.o. female.    12/18/20 right shoulder arthroscopy capsular release  Still having significant issues especially overhead and behind her back    Objective:    /74 (BP Location: Left arm, Patient Position: Sitting, Cuff Size: Adult)   Pulse 98   Ht 157.5 cm (62\")   Wt 64.4 kg (142 lb)   BMI 25.97 kg/m²     Physical Examination:  Right shoulder demonstrates passive elevation 160 abduction 90 external rotation 20 internal rotation S1       Imaging:      Assessment:  Continued stiffness after capsular release    Plan:  Further treatment options discussed, Risks and benefits of the injection were discussed. Under sterile technique and written consent I injected 80mg of Kenalog and 2cc of 1% Lidocaine plain into the shoulder and subacromial space. It was well tolerated. Postinjection instructions were given.  See me back in a month    Procedures  "

## 2021-02-22 NOTE — PROGRESS NOTES
Physical Therapy Daily Progress Note      Patient: Rashmi Diana   : 1980  Diagnosis/ICD-10 Code:  Status post shoulder surgery [Z98.890]   Problems Addressed this Visit     None      Visit Diagnoses     Status post shoulder surgery    -  Primary    Acute pain of right shoulder          Diagnoses       Codes Comments    Status post shoulder surgery    -  Primary ICD-10-CM: Z98.890  ICD-9-CM: V45.89     Acute pain of right shoulder     ICD-10-CM: M25.511  ICD-9-CM: 719.41          Referring practitioner: Dylan Valencia, *  Date of Initial Visit: Type: THERAPY  Noted: 2020  Today's Date: 2021    VISIT#: 18    Subjective Patient reports having continued limitations with IR/ER and unable to groom hair, which remains biggest goal at this time.       Objective Added prone anterior capsule contract/relax in IR.     See Exercise, Manual, and Modality Logs for complete treatment.     Assessment/Plan Patient responded well to added manual stretch, demonstrating improve IR without compensation to L 3. Patient will continue to benefit from improved IR/ER for improved functional use of shoulder with active range limited to 90 degrees flexion before UT overactivation.     Progress per Plan of Care         Timed:         Manual Therapy:    15     mins  24013;     Therapeutic Exercise:    30     mins  55040;     Neuromuscular Mallorie:        mins  77252;    Therapeutic Activity:          mins  08944;     Gait Training:           mins  37598;     Ultrasound:          mins  98042;    Ionto                                   mins   57725  Canalith Repos                   mins  24195    Un-Timed:  Electrical Stimulation:         mins  36234 (MC );  Dry Needling          mins self-pay  Traction          mins 42185    Timed Treatment:   45   mins   Total Treatment:     65   mins    Bulmaro Monique PTA  Physical Therapist

## 2021-03-01 ENCOUNTER — TREATMENT (OUTPATIENT)
Dept: PHYSICAL THERAPY | Facility: CLINIC | Age: 41
End: 2021-03-01

## 2021-03-01 DIAGNOSIS — Z98.890 STATUS POST SHOULDER SURGERY: Primary | ICD-10-CM

## 2021-03-01 DIAGNOSIS — M25.511 ACUTE PAIN OF RIGHT SHOULDER: ICD-10-CM

## 2021-03-01 PROCEDURE — 97140 MANUAL THERAPY 1/> REGIONS: CPT | Performed by: PHYSICAL THERAPIST

## 2021-03-01 PROCEDURE — 97110 THERAPEUTIC EXERCISES: CPT | Performed by: PHYSICAL THERAPIST

## 2021-03-01 NOTE — PROGRESS NOTES
Physical Therapy Daily Progress Note      Patient: Rashmi Diana   : 1980  Diagnosis/ICD-10 Code:  Status post shoulder surgery [Z98.890]   Problems Addressed this Visit     None      Visit Diagnoses     Status post shoulder surgery    -  Primary    Acute pain of right shoulder          Diagnoses       Codes Comments    Status post shoulder surgery    -  Primary ICD-10-CM: Z98.890  ICD-9-CM: V45.89     Acute pain of right shoulder     ICD-10-CM: M25.511  ICD-9-CM: 719.41          Referring practitioner: Dylan Valencia, *  Date of Initial Visit: Type: THERAPY  Noted: 2020  Today's Date: 3/1/2021    VISIT#: 19    Subjective Patient reports receiving cortisone injection in shoulder with Dr. Valencia last visit. Currently having no pain, but continued difficulty with grooming hair and feeling unstable with flexion above 90 degrees.       Objective active flexion to 160 degrees, can achieve 170 with assistance.     See Exercise, Manual, and Modality Logs for complete treatment.     Assessment/Plan continued ER tightness noted limiting ability to groom hair. Patient with continued shaking with flexion above 90 degrees. Patient will continue to benefit from improved base scapular strengthening for improved stability with flexion above 90 degrees.     Progress per Plan of Care         Timed:         Manual Therapy:    15     mins  61291;     Therapeutic Exercise:    30     mins  54420;     Neuromuscular Mallorie:        mins  72688;    Therapeutic Activity:          mins  10646;     Gait Training:           mins  66231;     Ultrasound:          mins  09911;    Ionto                                   mins   05407  Canalith Repos                   mins  83838    Un-Timed:  Electrical Stimulation:         mins  77747 ( );  Dry Needling          mins self-pay  Traction          mins 74012    Timed Treatment:   45   mins   Total Treatment:     65   mins    Bulmaro Monique PTA  Physical Therapist

## 2021-03-03 ENCOUNTER — TREATMENT (OUTPATIENT)
Dept: PHYSICAL THERAPY | Facility: CLINIC | Age: 41
End: 2021-03-03

## 2021-03-03 DIAGNOSIS — Z98.890 STATUS POST SHOULDER SURGERY: Primary | ICD-10-CM

## 2021-03-03 DIAGNOSIS — M25.511 ACUTE PAIN OF RIGHT SHOULDER: ICD-10-CM

## 2021-03-03 PROCEDURE — 97110 THERAPEUTIC EXERCISES: CPT | Performed by: PHYSICAL THERAPIST

## 2021-03-03 PROCEDURE — 97140 MANUAL THERAPY 1/> REGIONS: CPT | Performed by: PHYSICAL THERAPIST

## 2021-03-03 NOTE — PROGRESS NOTES
Physical Therapy Daily Progress Note      Patient: Rashmi Diana   : 1980  Diagnosis/ICD-10 Code:  Status post shoulder surgery [Z98.890]   Problems Addressed this Visit     None      Visit Diagnoses     Status post shoulder surgery    -  Primary    Acute pain of right shoulder          Diagnoses       Codes Comments    Status post shoulder surgery    -  Primary ICD-10-CM: Z98.890  ICD-9-CM: V45.89     Acute pain of right shoulder     ICD-10-CM: M25.511  ICD-9-CM: 719.41          Referring practitioner: Dylan Valencia, *  Date of Initial Visit: Type: THERAPY  Noted: 2020  Today's Date: 3/3/2021    VISIT#: 20    Subjective Patient reports feeling gradual improvements following last visit with increased focus on ER/ IR stretching.       Objective active flexion 165 degrees, abduction 150 degrees, IR body L 4 post stretch, ER 60 degrees 70 post stretch. Dash functional questionnaire improved from 68% to 25% impairment.      See Exercise, Manual, and Modality Logs for complete treatment.     Assessment/Plan  Patient responding well to focused stretching on IR/ER demonstrating improved active mobility. Patient will continue to benefit from base scapular strengthening/ stretching for improved flexion/abduction and improved mobility for IR/ER.     Progress per Plan of Care update POC          Timed:         Manual Therapy:    15     mins  41688;     Therapeutic Exercise:    30     mins  08387;     Neuromuscular Mallorie:        mins  43456;    Therapeutic Activity:          mins  75614;     Gait Training:           mins  21615;     Ultrasound:          mins  28288;    Ionto                                   mins   02373  Canalith Repos                  mins  66216    Un-Timed:  Electrical Stimulation:         mins  80580 ( );  Dry Needling          mins self-pay  Traction          mins 27138    Timed Treatment:   45   mins   Total Treatment:     65   mins    Bulmaro Monique PTA  Physical  Therapist

## 2021-03-04 NOTE — PROGRESS NOTES
Re-Assessment / Re-Certification        Patient: Rashmi Diana   : 1980  Diagnosis/ICD-10 Code:  Status post shoulder surgery [Z98.890]  Referring practitioner: Dylan Valencia, *  Date of Initial Visit: Type: THERAPY  Noted: 2020  Today's Date: 3/4/2021  Patient seen for 20 sessions      Subjective:   Rashmi Diana reports: Continued gradual improvement.  Really excited that she could feel her muscle kicking in when she was trying to reach overhead.    Subjective Questionnaire: QuickDASH: 25% impairment (improved from 68%)  Clinical Progress: improved  Home Program Compliance: Yes  Treatment has included: therapeutic exercise, manual therapy, therapeutic activity, electrical stimulation, dry needling, moist heat and cryotherapy    Subjective Improving tolerance to ADLs  Objective AROM R sh without shoulder shrugging/back extension to 135 degrees, IR to L4                  PROM: sh flex WFL, abd WFL, ER to 70 deg post stretch       STRENGTH R sh flex 4/5 within available range, abd 3/5, IR 3+/5, ER 3+/5    Assessment/Plan Pt presents with good passive mobility.  Improving active mobility.  Gradually improving strength.  Improving shoulder pain.  Pt would benefit from continued skilled physical therapy to progress strength and active mobility while cueing to avoid chronic compensation techniques.  Plan to continue 1-2 x/ week x 10 visits.      Progress toward previous goals: Partially Met    Goals  Plan Goals: STG  Pt I with HEP in 2 weeks.  MET  To improve R sh active flex to 150 in 2-3 weeks.  PARTIALLY MET  To dec pain in shoulder 0-1/10 max in 2-3 weeks. NOT MET  LTG  To dem R sh AROM WFL in 4-6 weeks. NOT MET  To dem R sh strength 4+/5 grossly in 4-6 weeks. NOT MET  To dec pain in R sh 0-1/10 max in 4-6 weeks.  NOT MET      Recommendations: Continue with recommendations 1-2 x / week  Timeframe: 6 weeks  Prognosis to achieve goals: good    PT Signature: Venus Baum, PT, DPT    Based upon  review of the patient's progress and continued therapy plan, it is my medical opinion that Rashmi Diana should continue physical therapy treatment at [facility]  [address] [place] [address] [address] [place] [address] [phone].    Signature: __________________________________  Dylan Valencia MD

## 2021-03-08 ENCOUNTER — TREATMENT (OUTPATIENT)
Dept: PHYSICAL THERAPY | Facility: CLINIC | Age: 41
End: 2021-03-08

## 2021-03-08 DIAGNOSIS — Z98.890 STATUS POST SHOULDER SURGERY: Primary | ICD-10-CM

## 2021-03-08 DIAGNOSIS — M25.511 ACUTE PAIN OF RIGHT SHOULDER: ICD-10-CM

## 2021-03-08 PROCEDURE — 97110 THERAPEUTIC EXERCISES: CPT | Performed by: PHYSICAL THERAPIST

## 2021-03-08 PROCEDURE — 97140 MANUAL THERAPY 1/> REGIONS: CPT | Performed by: PHYSICAL THERAPIST

## 2021-03-08 NOTE — PROGRESS NOTES
Physical Therapy Daily Progress Note      Patient: Rashmi Diana   : 1980  Diagnosis/ICD-10 Code:  Status post shoulder surgery [Z98.890]   Problems Addressed this Visit     None      Visit Diagnoses     Status post shoulder surgery    -  Primary    Acute pain of right shoulder          Diagnoses       Codes Comments    Status post shoulder surgery    -  Primary ICD-10-CM: Z98.890  ICD-9-CM: V45.89     Acute pain of right shoulder     ICD-10-CM: M25.511  ICD-9-CM: 719.41          Referring practitioner: Dylan Valencia, *  Date of Initial Visit: Type: THERAPY  Noted: 2020  Today's Date: 3/8/2021    VISIT#: 21    Subjective Patient reports feeling decreased soreness and is noticing improved motion, pleased with progress at this time. Does feel continued weakness with flexion above 90 degrees.       Objective       See Exercise, Manual, and Modality Logs for complete treatment.     Assessment/Plan Patient with noticeable shaking and unsteadiness with flexion above 90 degrees. Patient will continue to benefit from improved base scapular strengthening for improved stability. Patient currently making good gains towards goals at this time.    Progress per Plan of Care         Timed:         Manual Therapy:    15     mins  95659;     Therapeutic Exercise:    30     mins  53362;     Neuromuscular Mallorie:        mins  04534;    Therapeutic Activity:          mins  39345;     Gait Training:           mins  46027;     Ultrasound:          mins  93243;    Ionto                                   mins   58981  Canalith Repos                   mins  65962    Un-Timed:  Electrical Stimulation:         mins  15538 (MC );  Dry Needling          mins self-pay  Traction          mins 55236    Timed Treatment:   45   mins   Total Treatment:     65   mins    Bulmaro Monique PTA  Physical Therapist

## 2021-03-10 ENCOUNTER — TREATMENT (OUTPATIENT)
Dept: PHYSICAL THERAPY | Facility: CLINIC | Age: 41
End: 2021-03-10

## 2021-03-10 DIAGNOSIS — M25.511 ACUTE PAIN OF RIGHT SHOULDER: ICD-10-CM

## 2021-03-10 DIAGNOSIS — Z98.890 STATUS POST SHOULDER SURGERY: Primary | ICD-10-CM

## 2021-03-10 PROCEDURE — 97110 THERAPEUTIC EXERCISES: CPT | Performed by: PHYSICAL THERAPIST

## 2021-03-10 PROCEDURE — 97140 MANUAL THERAPY 1/> REGIONS: CPT | Performed by: PHYSICAL THERAPIST

## 2021-03-10 NOTE — PROGRESS NOTES
Physical Therapy Daily Progress Note      Patient: Rashmi Diana   : 1980  Diagnosis/ICD-10 Code:  Status post shoulder surgery [Z98.890]   Problems Addressed this Visit     None      Visit Diagnoses     Status post shoulder surgery    -  Primary    Acute pain of right shoulder          Diagnoses       Codes Comments    Status post shoulder surgery    -  Primary ICD-10-CM: Z98.890  ICD-9-CM: V45.89     Acute pain of right shoulder     ICD-10-CM: M25.511  ICD-9-CM: 719.41          Referring practitioner: Dylan Valencia, *  Date of Initial Visit: Type: THERAPY  Noted: 2020  Today's Date: 3/10/2021    VISIT#: 22    Subjective Patient reports feeling better every week and is pleased with increased active motion over the past couple weeks, beginning to groom hair with more ease.       Objective     See Exercise, Manual, and Modality Logs for complete treatment.     Assessment/Plan Patient progressing well with improved active ER/IR and flexion. Noted continued weakness with flexion above 90 degrees, will continue to benefit from improved base scapular strengthening for improved stability and continue to return active motion WFL.   Plan Goals: STG  Pt I with HEP in 2 weeks. MET  To improve R sh active flex to 150 in 2-3 weeks.  MET  To dec pain in shoulder 0-1/10 max in 2-3 weeks. progressing  LTG  To dem R sh AROM WFL in 4-6 weeks. Progressing  To dem R sh strength 4+/5 grossly in 4-6 weeks. Progressing  To dec pain in R sh 0-1/10 max in 4-6 weeks Progressing  Progress per Plan of Care         Timed:         Manual Therapy:    15     mins  50744;     Therapeutic Exercise:    30     mins  70563;     Neuromuscular Mallorie:        mins  63571;    Therapeutic Activity:          mins  67297;     Gait Training:           mins  74942;     Ultrasound:          mins  60401;    Ionto                                   mins   84242  Canalith Repos                  mins  33475    Un-Timed:  Electrical  Stimulation:         mins  20617 ( );  Dry Needling          mins self-pay  Traction          mins 25788    Timed Treatment:   45   mins   Total Treatment:     55   mins    Bulmaro Monique PTA  Physical Therapist

## 2021-03-15 ENCOUNTER — TREATMENT (OUTPATIENT)
Dept: PHYSICAL THERAPY | Facility: CLINIC | Age: 41
End: 2021-03-15

## 2021-03-15 DIAGNOSIS — S46.811D TRAUMATIC TEAR OF SUPRASPINATUS TENDON OF RIGHT SHOULDER, SUBSEQUENT ENCOUNTER: ICD-10-CM

## 2021-03-15 DIAGNOSIS — M25.511 ACUTE PAIN OF RIGHT SHOULDER: ICD-10-CM

## 2021-03-15 DIAGNOSIS — Z98.890 STATUS POST SHOULDER SURGERY: Primary | ICD-10-CM

## 2021-03-15 PROCEDURE — 97110 THERAPEUTIC EXERCISES: CPT | Performed by: PHYSICAL THERAPIST

## 2021-03-15 PROCEDURE — 97140 MANUAL THERAPY 1/> REGIONS: CPT | Performed by: PHYSICAL THERAPIST

## 2021-03-15 NOTE — PROGRESS NOTES
Physical Therapy Daily Progress Note      Patient: Rashmi Diana   : 1980  Diagnosis/ICD-10 Code:  Status post shoulder surgery [Z98.890]   Problems Addressed this Visit        Musculoskeletal and Injuries    Traumatic tear of supraspinatus tendon of right shoulder      Other Visit Diagnoses     Status post shoulder surgery    -  Primary    Acute pain of right shoulder          Diagnoses       Codes Comments    Status post shoulder surgery    -  Primary ICD-10-CM: Z98.890  ICD-9-CM: V45.89     Acute pain of right shoulder     ICD-10-CM: M25.511  ICD-9-CM: 719.41     Traumatic tear of supraspinatus tendon of right shoulder, subsequent encounter     ICD-10-CM: S46.811D  ICD-9-CM: V58.89, 840.6          Referring practitioner: Dylan Valencia, *  Date of Initial Visit: Type: THERAPY  Noted: 2020  Today's Date: 3/15/2021    VISIT#: 23    Subjective Pt stated she has been doing better with reaching behind back.  New prone stretch has been very helpful.      Objective Completed exercise with focus on improving mobility RUE strength.  Trialed standing press and punch with cueing for technique.      See Exercise, Manual, and Modality Logs for complete treatment.     Assessment/Plan Improving passive and active mobility.  Pt tolerated tx well.  Gradually improving strength.      Progress per Plan of Care         Timed:         Manual Therapy:    15     mins  20616;     Therapeutic Exercise:    30     mins  65165;     Neuromuscular Mallorie:        mins  83447;    Therapeutic Activity:          mins  02121;     Gait Training:           mins  90286;     Ultrasound:          mins  45364;    Ionto                                   mins   85949  Self Care                            mins   98461  Canalith Repos                   mins  01503    Un-Timed:  Electrical Stimulation:         mins  41709 ( );  Dry Needling          mins self-pay  Traction          mins 44430  Low Eval          Mins  74795  Mod  Eval          Mins  82426  High Eval                            Mins  52758  Re-Eval                               mins  12631    Timed Treatment:   45   mins   Total Treatment:     60   mins    Venus Baum PT  Physical Therapist

## 2021-03-17 ENCOUNTER — TREATMENT (OUTPATIENT)
Dept: PHYSICAL THERAPY | Facility: CLINIC | Age: 41
End: 2021-03-17

## 2021-03-17 DIAGNOSIS — M25.511 ACUTE PAIN OF RIGHT SHOULDER: ICD-10-CM

## 2021-03-17 DIAGNOSIS — Z98.890 STATUS POST SHOULDER SURGERY: Primary | ICD-10-CM

## 2021-03-17 PROCEDURE — 97110 THERAPEUTIC EXERCISES: CPT | Performed by: PHYSICAL THERAPIST

## 2021-03-17 PROCEDURE — 97140 MANUAL THERAPY 1/> REGIONS: CPT | Performed by: PHYSICAL THERAPIST

## 2021-03-17 NOTE — PROGRESS NOTES
Physical Therapy Daily Progress Note      Patient: Rashmi Diana   : 1980  Diagnosis/ICD-10 Code:  Status post shoulder surgery [Z98.890]   Problems Addressed this Visit     None      Visit Diagnoses     Status post shoulder surgery    -  Primary    Acute pain of right shoulder          Diagnoses       Codes Comments    Status post shoulder surgery    -  Primary ICD-10-CM: Z98.890  ICD-9-CM: V45.89     Acute pain of right shoulder     ICD-10-CM: M25.511  ICD-9-CM: 719.41          Referring practitioner: Dylan Valencia, *  Date of Initial Visit: Type: THERAPY  Noted: 2020  Today's Date: 3/17/2021    VISIT#: 24    Subjective Patient reports feeling good today, biggest complaint remains with strength above 90 degrees.       Objective added standing shoulder flexion against wall with 1#.     See Exercise, Manual, and Modality Logs for complete treatment.     Assessment/Plan Patient with noticeable instability with flexion at 160 degrees. Patient will continue to benefit from improved base scapular strengthening to continue to improved stability with functional movement. Patient continues to progress well towards goals at this time.     Progress per Plan of Care         Timed:         Manual Therapy:    15     mins  86258;     Therapeutic Exercise:    30     mins  98570;     Neuromuscular Mallorie:        mins  83212;    Therapeutic Activity:          mins  53180;     Gait Training:           mins  72123;     Ultrasound:          mins  12727;    Ionto                                   mins   36770  Canalith Repos                   mins  73064    Un-Timed:  Electrical Stimulation:         mins  82549 ( );  Dry Needling          mins self-pay  Traction          mins 86900    Timed Treatment:   45   mins   Total Treatment:     65   mins    Bulmaro Monique PTA  Physical Therapist

## 2021-03-29 ENCOUNTER — TREATMENT (OUTPATIENT)
Dept: PHYSICAL THERAPY | Facility: CLINIC | Age: 41
End: 2021-03-29

## 2021-03-29 ENCOUNTER — OFFICE VISIT (OUTPATIENT)
Dept: ORTHOPEDIC SURGERY | Facility: CLINIC | Age: 41
End: 2021-03-29

## 2021-03-29 VITALS
DIASTOLIC BLOOD PRESSURE: 74 MMHG | BODY MASS INDEX: 26.13 KG/M2 | HEART RATE: 89 BPM | SYSTOLIC BLOOD PRESSURE: 110 MMHG | WEIGHT: 142 LBS | HEIGHT: 62 IN

## 2021-03-29 DIAGNOSIS — M75.01 ADHESIVE CAPSULITIS OF RIGHT SHOULDER: Primary | ICD-10-CM

## 2021-03-29 DIAGNOSIS — Z98.890 STATUS POST SHOULDER SURGERY: Primary | ICD-10-CM

## 2021-03-29 DIAGNOSIS — M25.511 ACUTE PAIN OF RIGHT SHOULDER: ICD-10-CM

## 2021-03-29 PROCEDURE — 97110 THERAPEUTIC EXERCISES: CPT | Performed by: PHYSICAL THERAPIST

## 2021-03-29 PROCEDURE — 97140 MANUAL THERAPY 1/> REGIONS: CPT | Performed by: PHYSICAL THERAPIST

## 2021-03-29 PROCEDURE — 99213 OFFICE O/P EST LOW 20 MIN: CPT | Performed by: ORTHOPAEDIC SURGERY

## 2021-03-29 NOTE — PROGRESS NOTES
"     Patient ID: Rashmi Diana is a 40 y.o. female.  Right shoulder pain  12/18/20 right shoulder arthroscopy capsular release  Had cortisone injection for stiffness postoperatively on February 22.  Pain improving with therapy not at goal    Review of Systems:    Right shoulder pain improving    Objective:    /74   Pulse 89   Ht 157.5 cm (62\")   Wt 64.4 kg (142 lb)   BMI 25.97 kg/m²     Physical Examination:     Right shoulder demonstrates healed incisions, passive elevation 160 degrees abduction 120 degrees external rotation 40 degrees internal rotation L5    Imaging:       Assessment:    Improving after initial shoulder arthroscopy with cuff repair with subsequent capsular release    Plan:   Continue formal therapy and home exercise program, otherwise activity as tolerated and see me in 6 to 8 weeks      Procedures          Disclaimer: Please note that areas of this note were completed with computer voice recognition software.  Quite often unanticipated grammatical, syntax, homophones, and other interpretive errors are inadvertently transcribed by the computer software. Please excuse any errors that have escaped final proofreading.  "

## 2021-03-29 NOTE — PROGRESS NOTES
Physical Therapy Daily Progress Note      Patient: Rashmi Diana   : 1980  Diagnosis/ICD-10 Code:  Status post shoulder surgery [Z98.890]   Problems Addressed this Visit     None      Visit Diagnoses     Status post shoulder surgery    -  Primary    Acute pain of right shoulder          Diagnoses       Codes Comments    Status post shoulder surgery    -  Primary ICD-10-CM: Z98.890  ICD-9-CM: V45.89     Acute pain of right shoulder     ICD-10-CM: M25.511  ICD-9-CM: 719.41          Referring practitioner: Dylan Valencia, *  Date of Initial Visit: Type: THERAPY  Noted: 2020  Today's Date: 3/29/2021    VISIT#: 25    Subjective Patient reports MD visit went well and was pleased with progress at this time. Biggest complaint with patient remains strength with flexion above 90 degrees.       Objective added PNF D2. Pattern    See Exercise, Manual, and Modality Logs for complete treatment.     Assessment/Plan Patient will continue to benefit from improved base scapular strengthening for improved stability with flexion above 90 degrees. Patient able to complete full passive motion and able to actively raise arm to 150 degrees.   Plan Goals: STG  Pt I with HEP in 2 weeks. MET  To improve R sh active flex to 150 in 2-3 weeks.  MET  To dec pain in shoulder 0-1/10 max in 2-3 weeks. MET  LTG  To dem R sh AROM WFL in 4-6 weeks. Progresing  To dem R sh strength 4+/5 grossly in 4-6 weeks. progressing  To dec pain in R sh 0-1/10 max in 4-6 weeks MET  Progress per Plan of Care         Timed:         Manual Therapy:    15     mins  44104;     Therapeutic Exercise:    30     mins  67493;     Neuromuscular Mallorie:        mins  28199;    Therapeutic Activity:          mins  99112;     Gait Training:           mins  00214;     Ultrasound:          mins  60528;    Ionto                                   mins   88478  Canalith Repos                   mins  07812    Un-Timed:  Electrical Stimulation:         mins  85746  ( );  Dry Needling          mins self-pay  Traction          mins 85855    Timed Treatment:   45   mins   Total Treatment:     65   mins    Bulmaro Monique PTA  Physical Therapist

## 2021-03-31 ENCOUNTER — TREATMENT (OUTPATIENT)
Dept: PHYSICAL THERAPY | Facility: CLINIC | Age: 41
End: 2021-03-31

## 2021-03-31 DIAGNOSIS — M25.511 ACUTE PAIN OF RIGHT SHOULDER: ICD-10-CM

## 2021-03-31 DIAGNOSIS — Z98.890 STATUS POST SHOULDER SURGERY: Primary | ICD-10-CM

## 2021-03-31 PROCEDURE — 97110 THERAPEUTIC EXERCISES: CPT | Performed by: PHYSICAL THERAPIST

## 2021-03-31 PROCEDURE — 97140 MANUAL THERAPY 1/> REGIONS: CPT | Performed by: PHYSICAL THERAPIST

## 2021-03-31 NOTE — PROGRESS NOTES
Physical Therapy Daily Progress Note      Patient: Rashmi Diana   : 1980  Diagnosis/ICD-10 Code:  Status post shoulder surgery [Z98.890]   Problems Addressed this Visit     None      Visit Diagnoses     Status post shoulder surgery    -  Primary    Acute pain of right shoulder          Diagnoses       Codes Comments    Status post shoulder surgery    -  Primary ICD-10-CM: Z98.890  ICD-9-CM: V45.89     Acute pain of right shoulder     ICD-10-CM: M25.511  ICD-9-CM: 719.41          Referring practitioner: Dylan Valencia, *  Date of Initial Visit: Type: THERAPY  Noted: 2020  Today's Date: 3/31/2021    VISIT#: 26    Subjective Patient reports feeling mild but manageable muscle soreness following last progressed PT visit. Soreness has resolved and feeling good today.       Objective  Active flexion ~ 160 degrees, active IR ~ L4, active ER 70 degrees    See Exercise, Manual, and Modality Logs for complete treatment.     Assessment/Plan Patient continues to struggle with active flexion and fatigues quickly. Patient will continue to benefit from improved base scapular strengthening to continue to improve stability with active flexion. Passive ROM is WFL.   Plan Goals: STG  Pt I with HEP in 2 weeks. MET  To improve R sh active flex to 150 in 2-3 weeks.  MET  To dec pain in shoulder 0-1/10 max in 2-3 weeks. MET  LTG  To dem R sh AROM WFL in 4-6 weeks. Progresing  To dem R sh strength 4+/5 grossly in 4-6 weeks. progressing  To dec pain in R sh 0-1/10 max in 4-6 weeks MET  Progress per Plan of Care         Timed:         Manual Therapy:    15     mins  36416;     Therapeutic Exercise:    30     mins  22947;     Neuromuscular Mallorie:        mins  36674;    Therapeutic Activity:          mins  78034;     Gait Training:           mins  18856;     Ultrasound:          mins  42822;    Ionto                                   mins   67992  Canalith Repos                   mins  51926    Un-Timed:  Electrical  Stimulation:         mins  95078 ( );  Dry Needling          mins self-pay  Traction          mins 67973    Timed Treatment:   45   mins   Total Treatment:     65   mins    Bulmaro Monique PTA  Physical Therapist

## 2021-04-07 ENCOUNTER — TREATMENT (OUTPATIENT)
Dept: PHYSICAL THERAPY | Facility: CLINIC | Age: 41
End: 2021-04-07

## 2021-04-07 DIAGNOSIS — M25.511 ACUTE PAIN OF RIGHT SHOULDER: ICD-10-CM

## 2021-04-07 DIAGNOSIS — Z98.890 STATUS POST SHOULDER SURGERY: Primary | ICD-10-CM

## 2021-04-07 PROCEDURE — 97140 MANUAL THERAPY 1/> REGIONS: CPT | Performed by: PHYSICAL THERAPIST

## 2021-04-07 PROCEDURE — 97110 THERAPEUTIC EXERCISES: CPT | Performed by: PHYSICAL THERAPIST

## 2021-04-07 NOTE — PROGRESS NOTES
Physical Therapy Daily Progress Note      Patient: Rashmi Diana   : 1980  Diagnosis/ICD-10 Code:  Status post shoulder surgery [Z98.890]   Problems Addressed this Visit     None      Visit Diagnoses     Status post shoulder surgery    -  Primary    Acute pain of right shoulder          Diagnoses       Codes Comments    Status post shoulder surgery    -  Primary ICD-10-CM: Z98.890  ICD-9-CM: V45.89     Acute pain of right shoulder     ICD-10-CM: M25.511  ICD-9-CM: 719.41          Referring practitioner: Dylan Valencia, *  Date of Initial Visit: Type: THERAPY  Noted: 2020  Today's Date: 2021    VISIT#: 27    Subjective Patient reports feeling a little stiff today after only going to PT once a week.       Objective ER standing to 70 degrees. Added standing ER with abduction stretch with green TB.     See Exercise, Manual, and Modality Logs for complete treatment.     Assessment/Plan Patient responded well to added stretch and demonstrated improved flexion to 165 degrees without assistance. Patient will continue to benefit from improved flexibility with ER and improved base scapular strength for improved active stability.   Plan Goals: STG  Pt I with HEP in 2 weeks. MET  To improve R sh active flex to 150 in 2-3 weeks.  MET  To dec pain in shoulder 0-1/10 max in 2-3 weeks. MET  LTG  To dem R sh AROM WFL in 4-6 weeks. Progresing  To dem R sh strength 4+/5 grossly in 4-6 weeks. progressing  To dec pain in R sh 0-1/10 max in 4-6 weeks MET  Progress per Plan of Care         Timed:         Manual Therapy:    15     mins  24424;     Therapeutic Exercise:    30     mins  17971;     Neuromuscular Mallorie:        mins  27738;    Therapeutic Activity:          mins  92563;     Gait Training:           mins  01481;     Ultrasound:          mins  08976;    Ionto                                   mins   93891  Canalith Repos                   mins  06984    Un-Timed:  Electrical Stimulation:         mins   78945 ( );  Dry Needling          mins self-pay  Traction          mins 99421    Timed Treatment:   45   mins   Total Treatment:     65   mins    Bulmaro Monique PTA  Physical Therapist

## 2021-04-14 ENCOUNTER — TREATMENT (OUTPATIENT)
Dept: PHYSICAL THERAPY | Facility: CLINIC | Age: 41
End: 2021-04-14

## 2021-04-14 DIAGNOSIS — M25.511 ACUTE PAIN OF RIGHT SHOULDER: ICD-10-CM

## 2021-04-14 DIAGNOSIS — Z98.890 STATUS POST SHOULDER SURGERY: Primary | ICD-10-CM

## 2021-04-14 PROCEDURE — 97110 THERAPEUTIC EXERCISES: CPT | Performed by: PHYSICAL THERAPIST

## 2021-04-14 PROCEDURE — 97140 MANUAL THERAPY 1/> REGIONS: CPT | Performed by: PHYSICAL THERAPIST

## 2021-04-14 NOTE — PROGRESS NOTES
Physical Therapy Daily Progress Note      Patient: Rashmi Diana   : 1980  Diagnosis/ICD-10 Code:  Status post shoulder surgery [Z98.890]   Problems Addressed this Visit     None      Visit Diagnoses     Status post shoulder surgery    -  Primary    Acute pain of right shoulder          Diagnoses       Codes Comments    Status post shoulder surgery    -  Primary ICD-10-CM: Z98.890  ICD-9-CM: V45.89     Acute pain of right shoulder     ICD-10-CM: M25.511  ICD-9-CM: 719.41          Referring practitioner: Dylan Valencia, *  Date of Initial Visit: Type: THERAPY  Noted: 2020  Today's Date: 2021    VISIT#: 28    Subjective Patient reports feeling stronger and able to raise arm higher with more confidence pleased with progress.       Objective increased weight with PNF patterns, improved active flexion with no UT hiking to 165 degrees.     See Exercise, Manual, and Modality Logs for complete treatment.     Assessment/Plan Patient continues to demonstrate improved active IR, flexion and ER without increased pain. Patient continues to make good gains towards remaining goals at this time.   Plan Goals: STG  Pt I with HEP in 2 weeks. MET  To improve R sh active flex to 150 in 2-3 weeks.  MET  To dec pain in shoulder 0-1/10 max in 2-3 weeks. MET  LTG  To dem R sh AROM WFL in 4-6 weeks. Progresing  To dem R sh strength 4+/5 grossly in 4-6 weeks. progressing  To dec pain in R sh 0-1/10 max in 4-6 weeks MET  Progress per Plan of Care         Timed:         Manual Therapy:    15     mins  86440;     Therapeutic Exercise:    30     mins  88669;     Neuromuscular Mallorie:        mins  66205;    Therapeutic Activity:          mins  78739;     Gait Training:           mins  78491;     Ultrasound:          mins  40583;    Ionto                                   mins   85983  Canalith Repos                  mins  04045    Un-Timed:  Electrical Stimulation:         mins  43727 ( );  Dry Needling           mins self-pay  Traction          mins 41091    Timed Treatment:   45   mins   Total Treatment:     65   mins    Bulmaro Monique, ELEAZAR  Physical Therapist

## 2021-04-27 ENCOUNTER — TREATMENT (OUTPATIENT)
Dept: PHYSICAL THERAPY | Facility: CLINIC | Age: 41
End: 2021-04-27

## 2021-04-27 DIAGNOSIS — Z98.890 STATUS POST SHOULDER SURGERY: Primary | ICD-10-CM

## 2021-04-27 DIAGNOSIS — M25.511 ACUTE PAIN OF RIGHT SHOULDER: ICD-10-CM

## 2021-04-27 PROCEDURE — 97110 THERAPEUTIC EXERCISES: CPT | Performed by: PHYSICAL THERAPIST

## 2021-04-27 PROCEDURE — 97140 MANUAL THERAPY 1/> REGIONS: CPT | Performed by: PHYSICAL THERAPIST

## 2021-04-27 NOTE — PROGRESS NOTES
Physical Therapy Daily Progress Note      Patient: Rashmi Diana   : 1980  Diagnosis/ICD-10 Code:  Status post shoulder surgery [Z98.890]   Problems Addressed this Visit     None      Visit Diagnoses     Status post shoulder surgery    -  Primary    Acute pain of right shoulder          Diagnoses       Codes Comments    Status post shoulder surgery    -  Primary ICD-10-CM: Z98.890  ICD-9-CM: V45.89     Acute pain of right shoulder     ICD-10-CM: M25.511  ICD-9-CM: 719.41          Referring practitioner: Dylan Valencia, *  Date of Initial Visit: Type: THERAPY  Noted: 2020  Today's Date: 2021    VISIT#: 29    Subjective Patient reports feeling continued weakness with overhead lifting but little to no pain in shoulder and very pleased with progress to this point.       Objective added overhead press     See Exercise, Manual, and Modality Logs for complete treatment.     Assessment/Plan Patient demonstrated increased fatigue with progressed overhead press exercise. Patient will continue to benefit from improved scapular strengthening for improved stability with flexion above 130 degrees.   Plan Goals: STG  Pt I with HEP in 2 weeks. MET  To improve R sh active flex to 150 in 2-3 weeks.  MET  To dec pain in shoulder 0-1/10 max in 2-3 weeks. MET  LTG  To dem R sh AROM WFL in 4-6 weeks. Progresing  To dem R sh strength 4+/5 grossly in 4-6 weeks. progressing  To dec pain in R sh 0-1/10 max in 4-6 weeks MET  Progress per Plan of Care         Timed:         Manual Therapy:    10     mins  89153;     Therapeutic Exercise:    30     mins  35322;     Neuromuscular Mallorie:        mins  40297;    Therapeutic Activity:          mins  29748;     Gait Training:           mins  67276;     Ultrasound:          mins  21004;    Ionto                                   mins   48754  Canalith Repos                   mins  72877    Un-Timed:  Electrical Stimulation:         mins  79794 ( );  Dry Needling           mins self-pay  Traction          mins 66941    Timed Treatment:   40   mins   Total Treatment:     60   mins    Bulmaro Monique, ELEAZAR  Physical Therapist

## 2021-05-05 ENCOUNTER — TREATMENT (OUTPATIENT)
Dept: PHYSICAL THERAPY | Facility: CLINIC | Age: 41
End: 2021-05-05

## 2021-05-05 DIAGNOSIS — M25.511 ACUTE PAIN OF RIGHT SHOULDER: ICD-10-CM

## 2021-05-05 DIAGNOSIS — Z98.890 STATUS POST SHOULDER SURGERY: Primary | ICD-10-CM

## 2021-05-05 PROCEDURE — 97140 MANUAL THERAPY 1/> REGIONS: CPT | Performed by: PHYSICAL THERAPIST

## 2021-05-05 PROCEDURE — 97110 THERAPEUTIC EXERCISES: CPT | Performed by: PHYSICAL THERAPIST

## 2021-05-05 NOTE — PROGRESS NOTES
Physical Therapy Daily Progress Note      Patient: Rashmi Diana   : 1980  Diagnosis/ICD-10 Code:  Status post shoulder surgery [Z98.890]   Problems Addressed this Visit     None      Visit Diagnoses     Status post shoulder surgery    -  Primary    Acute pain of right shoulder          Diagnoses       Codes Comments    Status post shoulder surgery    -  Primary ICD-10-CM: Z98.890  ICD-9-CM: V45.89     Acute pain of right shoulder     ICD-10-CM: M25.511  ICD-9-CM: 719.41          Referring practitioner: Dylan Valencia, *  Date of Initial Visit: Type: THERAPY  Noted: 2020  Today's Date: 2021    VISIT#: 30    Subjective Patient reports having three good days of feeling loose following PT before stiffness returns.       Objective began with MHP, followed by manual interventions, finishing with therapeutic exercise. AROM flexion ~165, IR L4, abduction 160, with passive motion being WFL.     See Exercise, Manual, and Modality Logs for complete treatment.     Assessment/Plan Patient will continue to benefit from improved scapular strengthening for improved stability active and functional mobility.   Plan Goals: STG  Pt I with HEP in 2 weeks. MET  To improve R sh active flex to 150 in 2-3 weeks.  MET  To dec pain in shoulder 0-1/10 max in 2-3 weeks. MET  LTG  To dem R sh AROM WFL in 4-6 weeks. Progresing  To dem R sh strength 4+/5 grossly in 4-6 weeks. progressing  To dec pain in R sh 0-1/10 max in 4-6 weeks MET  Progress per Plan of Care         Timed:         Manual Therapy:    10     mins  75728;     Therapeutic Exercise:    30     mins  79027;     Neuromuscular Mallorie:        mins  44162;    Therapeutic Activity:          mins  05104;     Gait Training:           mins  91678;     Ultrasound:          mins  19263;    Ionto                                   mins   93841  Canalith Repos                   mins  28527    Un-Timed:  Electrical Stimulation:         mins  99735 ( );  Dry  Needling          mins self-pay  Traction          mins 78331    Timed Treatment:   40   mins   Total Treatment:     50   mins    Bulmaro Monique, ELEAZAR  Physical Therapist

## 2021-05-10 ENCOUNTER — TELEPHONE (OUTPATIENT)
Dept: ORTHOPEDIC SURGERY | Facility: CLINIC | Age: 41
End: 2021-05-10

## 2021-05-10 DIAGNOSIS — R47.81 SLURRED SPEECH: Primary | ICD-10-CM

## 2021-05-10 NOTE — TELEPHONE ENCOUNTER
Please see Office visit 3/10/20 discussing this referral. Would you like to see her again before placing a new referral?

## 2021-05-10 NOTE — TELEPHONE ENCOUNTER
Provider: DR AISSATOU DUFFY     Caller: DANIELITO FOUNTAIN     Phone Number: [phone]    Reason for Call: PT CALLED IN STATING SHE SAW DR DUFFY BACK IN 2020 AND WAS HAVING SOME MIGRAINES , DR DUFFY HAD SENT PT IN FOR CT SCAN MARCH 2020 AND THEN REFERRED PT TO NEURO , PT WAS NOT ABLE TO GO TO APPT DUE TO COVID AND NEVER SAW NEURO. PT STATES SHE DOES NOT HAVE PCP , AND WILL NEED ANOTHER REFERRAL TO NEURO , REQUESTING REFERRAL FROM DR DUFFY TO SEE DR JOSEPH SEIPEL FOR CHRONIC MIGRAINES - PLEASE ADVISE    When was the patient last seen: 2020

## 2021-05-12 ENCOUNTER — TELEPHONE (OUTPATIENT)
Dept: PHYSICAL THERAPY | Facility: CLINIC | Age: 41
End: 2021-05-12

## 2021-05-12 NOTE — TELEPHONE ENCOUNTER
PATIENT CALLED TO LET TERRANCE KNOW SHE HAS HAD A SMALL STROKE AND HAS [facility]EN [facility]. SHE IS OKAY AND WAITING TO GET TOGETHER A PLAN WITH [facility] AT [facility]. SHE SHE ALSO REQUESTED JOHN AND TAM KNOW AS WELL.

## 2021-05-19 ENCOUNTER — TELEPHONE (OUTPATIENT)
Dept: PHYSICAL THERAPY | Facility: CLINIC | Age: 41
End: 2021-05-19

## 2021-06-02 ENCOUNTER — OFFICE VISIT (OUTPATIENT)
Dept: ORTHOPEDIC SURGERY | Facility: CLINIC | Age: 41
End: 2021-06-02

## 2021-06-02 VITALS
HEART RATE: 99 BPM | WEIGHT: 142 LBS | SYSTOLIC BLOOD PRESSURE: 119 MMHG | HEIGHT: 62 IN | BODY MASS INDEX: 26.13 KG/M2 | DIASTOLIC BLOOD PRESSURE: 78 MMHG

## 2021-06-02 DIAGNOSIS — Z47.89 ORTHOPEDIC AFTERCARE: Primary | ICD-10-CM

## 2021-06-02 DIAGNOSIS — M75.01 ADHESIVE CAPSULITIS OF RIGHT SHOULDER: ICD-10-CM

## 2021-06-02 DIAGNOSIS — M25.512 ACUTE PAIN OF LEFT SHOULDER: ICD-10-CM

## 2021-06-02 DIAGNOSIS — S46.011D TRAUMATIC COMPLETE TEAR OF RIGHT ROTATOR CUFF, SUBSEQUENT ENCOUNTER: ICD-10-CM

## 2021-06-02 PROCEDURE — 99213 OFFICE O/P EST LOW 20 MIN: CPT | Performed by: ORTHOPAEDIC SURGERY

## 2021-06-02 RX ORDER — ATORVASTATIN CALCIUM 40 MG/1
TABLET, FILM COATED ORAL
COMMUNITY
Start: 2021-05-14

## 2021-06-02 RX ORDER — ASPIRIN 325 MG
325 TABLET ORAL DAILY
COMMUNITY

## 2021-06-02 NOTE — PROGRESS NOTES
Patient ID: Rashmi Diana is a 41 y.o. female.  Right shoulder pain  12/18/20 right shoulder arthroscopy capsular release  Had cortisone injection for stiffness postoperatively on February 22.  Pain improving with therapy not at goal  Unfortunately suffered a stroke which she feels may have been secondary to chiropractor manipulations.  Has a little bit of a headache but no residual neurological complaint.  Does have some pain to the left shoulder, still with some tightness in the right shoulder    Review of Systems:    Bilateral shoulder pain    Objective:    There were no vitals taken for this visit.    Physical Examination:  Right shoulder demonstrates healed incisions passive elevation 170 abduction 140 external rotation 20 internal rotation is S1 with intact repair       Imaging:       Assessment:    Doing well after cuff repair with subsequent capsular release with residual stiffness    Plan:   Resume formal physical therapy including the left shoulder, see me in 2 months      Procedures          Disclaimer: Please note that areas of this note were completed with computer voice recognition software.  Quite often unanticipated grammatical, syntax, homophones, and other interpretive errors are inadvertently transcribed by the computer software. Please excuse any errors that have escaped final proofreading.

## 2021-06-22 ENCOUNTER — DOCUMENTATION (OUTPATIENT)
Dept: PHYSICAL THERAPY | Facility: CLINIC | Age: 41
End: 2021-06-22

## 2021-06-22 DIAGNOSIS — S46.011D TRAUMATIC TEAR OF RIGHT ROTATOR CUFF, SUBSEQUENT ENCOUNTER: ICD-10-CM

## 2021-06-22 DIAGNOSIS — Z98.890 STATUS POST SHOULDER SURGERY: Primary | ICD-10-CM

## 2021-06-22 DIAGNOSIS — M25.511 ACUTE PAIN OF RIGHT SHOULDER: ICD-10-CM

## 2021-06-22 DIAGNOSIS — S46.811D TRAUMATIC TEAR OF SUPRASPINATUS TENDON OF RIGHT SHOULDER, SUBSEQUENT ENCOUNTER: ICD-10-CM

## 2021-06-22 NOTE — PROGRESS NOTES
Discharge Summary  Discharge Summary from Physical Therapy Report      Dates  PT visit: 12/18/2020 - 5/5/2021  Number of Visits: 30     Discharge Status of Patient: See MD Note dated 5/5/2021    Goals: Partially Met    Discharge Plan: Future need for rehabilitation activities    Comments : Pt suffered small stroke and had to cancel remaining visits. Pt made gradual gains in ROM due to tightness and was diligent with HEP. PT   Per last progress note:  AROM flexion ~165, IR L4, abduction 160, with passive motion being WFL.     Date of Discharge 6/22/2021    New order received for PT and pt scheduled to return next week.        Minnie Kathleen, PT  Physical Therapist                       Xray Knee 3 Views, Left

## 2021-06-28 ENCOUNTER — TREATMENT (OUTPATIENT)
Dept: PHYSICAL THERAPY | Facility: CLINIC | Age: 41
End: 2021-06-28

## 2021-06-28 DIAGNOSIS — M54.2 NECK PAIN: ICD-10-CM

## 2021-06-28 DIAGNOSIS — Z98.890 HISTORY OF REPAIR OF RIGHT ROTATOR CUFF: ICD-10-CM

## 2021-06-28 DIAGNOSIS — R29.898 SHOULDER WEAKNESS: Primary | ICD-10-CM

## 2021-06-28 DIAGNOSIS — Z86.73 HISTORY OF COMPLETED STROKE: ICD-10-CM

## 2021-06-28 PROCEDURE — 97162 PT EVAL MOD COMPLEX 30 MIN: CPT | Performed by: PHYSICAL THERAPIST

## 2021-06-28 PROCEDURE — 97012 MECHANICAL TRACTION THERAPY: CPT | Performed by: PHYSICAL THERAPIST

## 2021-06-28 PROCEDURE — 97110 THERAPEUTIC EXERCISES: CPT | Performed by: PHYSICAL THERAPIST

## 2021-06-28 PROCEDURE — 97140 MANUAL THERAPY 1/> REGIONS: CPT | Performed by: PHYSICAL THERAPIST

## 2021-06-28 NOTE — PROGRESS NOTES
Physical Therapy Initial Evaluation and Plan of Care    Patient: Rashmi Diana   : 1980  Diagnosis/ICD-10 Code:  Shoulder weakness [R29.898]  Referring practitioner: Dylan Valencia, *  Date of Initial Visit: 2021  Today's Date: 2021  Patient seen for 1 sessions             Subjective Evaluation    History of Present Illness  Mechanism of injury: Patient is a 41 y.o. WF who returns to PT following CVA in May 2021. 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  She continues to take aspirin and Zoloft for anxiety.   She presents with residual weakness L shoulder and slight subluxation.  Her R shoulder continues to rehab from [facility] in  with continued weakness and ROM deficits.  She is no longer going to the chiropractor and has increased neck and shoulder tension. Personal goals are to be able to fix her hair with her R hand, regain L shoulder strength and reduce tension in neck and upper traps. Pain rating = 5/10.  She reports her energy level is not back up to normal yet but improving.  She has returned to work but continues to take a nap daily.    Patient Goals  Patient goals for therapy: decreased pain, increased motion and increased strength             Objective QuickDASH indicates 43% impairment with washing back, pain, sleep, tingling.  Mild L shoulder subluxation since CVA in May with 4/5 L shoulder flexion and abduction.  Muscle guarding and tenderness B upper traps, CPSM, subocc mm.  Patient using accessory neck mm for regular breathing.  Educated and instructed in diaphragmatic breathing.  Cervical AROM is WNL except rotation L is worse than rotation R with pain.  SB is tight B upper traps without pain.  No pain on Extension.  Pain at end-range flexion.  L shoulder AROM is WNL.  R shoulder flexion and abduction = 160 deg, IR = 55 deg, ER = 75 deg.  Cervical distraction decreases neck pain.      Assessment & Plan     Assessment  Impairments: abnormal  muscle tone, abnormal or restricted ROM, activity intolerance, impaired physical strength, lacks appropriate home exercise program and pain with function  Functional Limitations: sleeping, pulling, pushing, uncomfortable because of pain, moving in bed, reaching behind back and reaching overhead  Goals  Plan Goals: Patient independent with HEP in 2 weeks  Tolerate 10 min Nustep in 2 weeks  Able to return to fix hair with R arm in 2 weeks  Able to use diaphragmatic breathing to relax neck mm in 2 weeks  Improve function as evidenced by QuickDASH score of 20% or less by discharge (43% impairment initially)  Able to regain L shoulder flexion/abd strength overhead by discharge  Active R shoulder flexion and abduction to equal L by discharge       Plan  Therapy options: will be seen for skilled physical therapy services  Planned modality interventions: traction and thermotherapy (hydrocollator packs)  Other planned modality interventions: physical modalities as needed  Planned therapy interventions: manual therapy, neuromuscular re-education, strengthening, stretching, therapeutic activities, gait training, functional ROM exercises, flexibility, balance/weight-bearing training and home exercise program  Treatment plan discussed with: patient  Plan details: Plan to continue 1-2x's per week up to 20 visits if needed.        Timed:         Manual Therapy:    15     mins  96841;     Therapeutic Exercise:    15     mins  87517;     Neuromuscular Mallorie:        mins  57098;    Therapeutic Activity:          mins  60222;     Gait Training:           mins  12244;     Ultrasound:          mins  92902;    Ionto                                   mins   77276  Self-care  ____ mins 13191    Un-Timed:  Electrical Stimulation:         mins  66889 ( );  Dry Needling          mins self-pay  Traction     15     mins 22038  Low Eval          Mins  89938  Mod Eval     15     Mins  20259  High Eval                            Mins   16991  Canal repositioning _____ mins  61065        Timed Treatment:   30   mins   Total Treatment:     60   mins    PT SIGNATURE: Allen GUIDRY Loboferniedarren, PT   DATE TREATMENT INITIATED: 6/28/2021    Initial Certification  Certification Period: 9/26/2021  I certify that the therapy services are furnished while this patient is under my care.  The services outlined above are required by this patient, and will be reviewed every 90 days.     PHYSICIAN: Dylan Valencia, MD  ___________________________________________________________________________________________________    DATE: _______________________________________________________________________________________________________________________________________    Please sign and return via fax to [phone]. Thank you, [facility].

## 2021-07-01 ENCOUNTER — TREATMENT (OUTPATIENT)
Dept: PHYSICAL THERAPY | Facility: CLINIC | Age: 41
End: 2021-07-01

## 2021-07-01 DIAGNOSIS — M54.2 NECK PAIN: ICD-10-CM

## 2021-07-01 DIAGNOSIS — R29.898 SHOULDER WEAKNESS: Primary | ICD-10-CM

## 2021-07-01 DIAGNOSIS — Z86.73 HISTORY OF COMPLETED STROKE: ICD-10-CM

## 2021-07-01 DIAGNOSIS — Z98.890 HISTORY OF REPAIR OF RIGHT ROTATOR CUFF: ICD-10-CM

## 2021-07-01 PROCEDURE — 97112 NEUROMUSCULAR REEDUCATION: CPT | Performed by: PHYSICAL THERAPIST

## 2021-07-01 PROCEDURE — 97140 MANUAL THERAPY 1/> REGIONS: CPT | Performed by: PHYSICAL THERAPIST

## 2021-07-01 PROCEDURE — 97110 THERAPEUTIC EXERCISES: CPT | Performed by: PHYSICAL THERAPIST

## 2021-07-01 PROCEDURE — 97012 MECHANICAL TRACTION THERAPY: CPT | Performed by: PHYSICAL THERAPIST

## 2021-07-01 NOTE — PROGRESS NOTES
Physical Therapy Daily Progress Note    Patient: Rashmi Diana   : 1980  Diagnosis/ICD-10 Code:  Shoulder weakness [R29.898]  Referring practitioner: Dylan Valencia, *  Date of Initial Visit: Type: THERAPY  Noted: 2021  Today's Date: 2021  Patient seen for 2 sessions             Subjective Patient reports good response to traction last visit.  Requested to increase pull today.    Objective   See Exercise, Manual, and Modality Logs for complete treatment. Traction pull increased to 12# today.  Added KT to L shoulder for support.  Added open book, and shoulder strengthening per flow sheet.  Focused on keeping R elbow locked during overhead flexion and using L arm with hands clasped to assist. Contract/relax in end range flexion with elbow straight on R was effective especially when stretched on exhale.      Assessment/Plan  Patient independent with HEP in 2 weeks - PARTIALLY MET  Tolerate 10 min Nustep in 2 weeks - PARTIALLY MET  Able to return to fix hair with R arm in 2 weeks - NOT MET  Able to use diaphragmatic breathing to relax neck mm in 2 weeks - NOT MET  Improve function as evidenced by QuickDASH score of 20% or less by discharge (43% impairment initially) - NOT MET  Able to regain L shoulder flexion/abd strength overhead by discharge - NOT MET  Active R shoulder flexion and abduction to equal L by discharge  - NOT MET    Progress per Plan of Care up to 20 visits if needed           Timed:         Manual Therapy:    15     mins  22190;     Therapeutic Exercise:    15     mins  84871;     Neuromuscular Mallorie:    15    mins  18589;    Therapeutic Activity:          mins  84406;     Gait Training:           mins  88799;     Ultrasound:          mins  15778;    Ionto                                   mins   83079  Self Care                            mins   08854      Un-Timed:  Electrical Stimulation:         mins  47234 ( );  Dry Needling          mins self-pay  Traction      15     mins 39685  Low Eval          Mins  76030  Mod Eval          Mins  50595  High Eval                            Mins  21917  Canalith Repos                   mins  31437    Timed Treatment:   45   mins   Total Treatment:     60   mins    Robin A Sprigler, PT  Physical Therapist

## 2021-07-07 ENCOUNTER — TREATMENT (OUTPATIENT)
Dept: PHYSICAL THERAPY | Facility: CLINIC | Age: 41
End: 2021-07-07

## 2021-07-07 DIAGNOSIS — Z86.73 HISTORY OF COMPLETED STROKE: ICD-10-CM

## 2021-07-07 DIAGNOSIS — R29.898 SHOULDER WEAKNESS: Primary | ICD-10-CM

## 2021-07-07 DIAGNOSIS — M54.2 NECK PAIN: ICD-10-CM

## 2021-07-07 DIAGNOSIS — Z98.890 HISTORY OF REPAIR OF RIGHT ROTATOR CUFF: ICD-10-CM

## 2021-07-07 PROCEDURE — 97140 MANUAL THERAPY 1/> REGIONS: CPT | Performed by: PHYSICAL THERAPIST

## 2021-07-07 PROCEDURE — 97110 THERAPEUTIC EXERCISES: CPT | Performed by: PHYSICAL THERAPIST

## 2021-07-07 PROCEDURE — 97012 MECHANICAL TRACTION THERAPY: CPT | Performed by: PHYSICAL THERAPIST

## 2021-07-07 NOTE — PROGRESS NOTES
Physical Therapy Daily Progress Note      Patient: Rashmi Diana   : 1980  Diagnosis/ICD-10 Code:  Shoulder weakness [R29.898]  Referring practitioner: Dylan Valencia, *  Date of Initial Visit: Type: THERAPY  Noted: 2021  Today's Date: 2021  Patient seen for 3 sessions         Rashmi Diana reports: she is doing well today and reports she kept her KT tape on from her Thursday appointment to  at which time she removed it and noticed there was a stability difference and she could tell the KT tape had supported the shoulder. Pt. States the L shoulder is still weak but she has been working with it as hard as she can at home.    Objective   See Exercise, Manual, and Modality Logs for complete treatment.     Assessment/Plan   Pt. tolerating treatment well this visit and progressing through exercises well. Pt. Will benefit from progressing to new exercises next date due to developing strength and motion.    Progress per Plan of Care           Timed:         Manual Therapy:    10     mins  41449;     Therapeutic Exercise:    20     mins  86162;       Un-Timed:  Traction     15     mins 93783    Timed Treatment:   30   mins   Total Treatment:     45   mins    Cindy Siegel PTA  Physical Therapist Assistant License #[id number]

## 2021-07-14 ENCOUNTER — TREATMENT (OUTPATIENT)
Dept: PHYSICAL THERAPY | Facility: CLINIC | Age: 41
End: 2021-07-14

## 2021-07-14 DIAGNOSIS — M54.2 NECK PAIN: ICD-10-CM

## 2021-07-14 DIAGNOSIS — Z98.890 HISTORY OF REPAIR OF RIGHT ROTATOR CUFF: ICD-10-CM

## 2021-07-14 DIAGNOSIS — Z86.73 HISTORY OF COMPLETED STROKE: ICD-10-CM

## 2021-07-14 DIAGNOSIS — R29.898 SHOULDER WEAKNESS: Primary | ICD-10-CM

## 2021-07-14 PROCEDURE — 97140 MANUAL THERAPY 1/> REGIONS: CPT | Performed by: PHYSICAL THERAPIST

## 2021-07-14 PROCEDURE — 97110 THERAPEUTIC EXERCISES: CPT | Performed by: PHYSICAL THERAPIST

## 2021-07-14 NOTE — PROGRESS NOTES
Physical Therapy Daily Progress Note      Patient: Rashmi Diana   : 1980  Diagnosis/ICD-10 Code:  Shoulder weakness [R29.898]   Problems Addressed this Visit     None      Visit Diagnoses     Shoulder weakness    -  Primary    History of completed stroke        History of repair of right rotator cuff        Neck pain          Diagnoses       Codes Comments    Shoulder weakness    -  Primary ICD-10-CM: R29.898  ICD-9-CM: 719.61     History of completed stroke     ICD-10-CM: Z86.73  ICD-9-CM: V12.54     History of repair of right rotator cuff     ICD-10-CM: Z98.890  ICD-9-CM: V15.29     Neck pain     ICD-10-CM: M54.2  ICD-9-CM: 723.1          Referring practitioner: Dylan Valencia, *  Date of Initial Visit: Type: THERAPY  Noted: 2021  Today's Date: 2021    VISIT#: 4    Subjective Patient reports wanting to hold on cervical traction today, biggest complaint remains with strength above shoulder height.       Objective held cervical traction, added shoulder press, scaption 2#,     See Exercise, Manual, and Modality Logs for complete treatment.     Assessment/Plan Patient responded well to manual interventions, and tolerated progressed therapeutic exercise with only reports of mild fatigue. Patient will continue to benefit from improved base scapular strengthening for improved stability. Resume traction if needed per patient.     Progress per Plan of Care         Timed:         Manual Therapy:    15     mins  36560;     Therapeutic Exercise:    25     mins  61791;     Neuromuscular Mallorie:        mins  40615;    Therapeutic Activity:          mins  97053;     Gait Training:           mins  37932;     Ultrasound:          mins  41023;    Ionto                                   mins   48505  Canalith Repos                   mins  65489    Un-Timed:  Electrical Stimulation:         mins  86031 ( );  Dry Needling          mins self-pay  Traction          mins 38242    Timed Treatment:   40    mins   Total Treatment:     50   mins    Bulmaro Monique, PTA  Physical Therapist

## 2021-07-21 ENCOUNTER — TREATMENT (OUTPATIENT)
Dept: PHYSICAL THERAPY | Facility: CLINIC | Age: 41
End: 2021-07-21

## 2021-07-21 DIAGNOSIS — R29.898 SHOULDER WEAKNESS: Primary | ICD-10-CM

## 2021-07-21 DIAGNOSIS — Z98.890 HISTORY OF REPAIR OF RIGHT ROTATOR CUFF: ICD-10-CM

## 2021-07-21 DIAGNOSIS — M54.2 NECK PAIN: ICD-10-CM

## 2021-07-21 DIAGNOSIS — Z86.73 HISTORY OF COMPLETED STROKE: ICD-10-CM

## 2021-07-21 PROCEDURE — 97110 THERAPEUTIC EXERCISES: CPT | Performed by: PHYSICAL THERAPIST

## 2021-07-21 PROCEDURE — 97140 MANUAL THERAPY 1/> REGIONS: CPT | Performed by: PHYSICAL THERAPIST

## 2021-07-21 NOTE — PROGRESS NOTES
Physical Therapy Daily Progress Note      Patient: Rashmi Diana   : 1980  Diagnosis/ICD-10 Code:  Shoulder weakness [R29.898]   Problems Addressed this Visit     None      Visit Diagnoses     Shoulder weakness    -  Primary    History of completed stroke        History of repair of right rotator cuff        Neck pain          Diagnoses       Codes Comments    Shoulder weakness    -  Primary ICD-10-CM: R29.898  ICD-9-CM: 719.61     History of completed stroke     ICD-10-CM: Z86.73  ICD-9-CM: V12.54     History of repair of right rotator cuff     ICD-10-CM: Z98.890  ICD-9-CM: V15.29     Neck pain     ICD-10-CM: M54.2  ICD-9-CM: 723.1          Referring practitioner: Dylan Valencia, *  Date of Initial Visit: Type: THERAPY  Noted: 2021  Today's Date: 2021    VISIT#: 5    Subjective Patient reports having some increased tightness in neck since missing last week, also having some mild aching in L anterior shoulder at rest.       Objective began with MHP/ followed by manual interventions, finishing with therapeutic exercise.     See Exercise, Manual, and Modality Logs for complete treatment.     Assessment/Plan Patient responded well to manual interventions with decreased symptoms reported. Patient will continue to benefit from continued improved with cervical mobility and improved base scapular strengthening for improved stability with functional use.     Progress per Plan of Care         Timed:         Manual Therapy:    15     mins  93326;     Therapeutic Exercise:    25     mins  06251;     Neuromuscular Mallorie:        mins  07604;    Therapeutic Activity:          mins  52945;     Gait Training:           mins  40859;     Ultrasound:          mins  36559;    Ionto                                  mins   99674  Canalith Repos                   mins  48817    Un-Timed:  Electrical Stimulation:         mins  34369 ( );  Dry Needling          mins self-pay  Traction          mins  93215    Timed Treatment:   40   mins   Total Treatment:     50   mins    Bulmaro Monique, PTA  Physical Therapist

## 2021-07-26 ENCOUNTER — TREATMENT (OUTPATIENT)
Dept: PHYSICAL THERAPY | Facility: CLINIC | Age: 41
End: 2021-07-26

## 2021-07-26 DIAGNOSIS — Z98.890 HISTORY OF REPAIR OF RIGHT ROTATOR CUFF: ICD-10-CM

## 2021-07-26 DIAGNOSIS — Z86.73 HISTORY OF COMPLETED STROKE: ICD-10-CM

## 2021-07-26 DIAGNOSIS — R29.898 SHOULDER WEAKNESS: Primary | ICD-10-CM

## 2021-07-26 DIAGNOSIS — M54.2 NECK PAIN: ICD-10-CM

## 2021-07-26 PROCEDURE — 97110 THERAPEUTIC EXERCISES: CPT | Performed by: PHYSICAL THERAPIST

## 2021-07-26 PROCEDURE — 97140 MANUAL THERAPY 1/> REGIONS: CPT | Performed by: PHYSICAL THERAPIST

## 2021-07-26 PROCEDURE — 97530 THERAPEUTIC ACTIVITIES: CPT | Performed by: PHYSICAL THERAPIST

## 2021-07-26 NOTE — PROGRESS NOTES
Physical Therapy Daily Progress Note    Patient: Rashmi Diana   : 1980  Diagnosis/ICD-10 Code:  Shoulder weakness [R29.898]  Referring practitioner: Dylan Valencia, *  Date of Initial Visit: Type: THERAPY  Noted: 2021  Today's Date: 2021  Patient seen for 6 sessions             Subjective Finds diaphragmatic breathing helpful.  Still not interested in TDN at this time due to financial concerns.  Still having difficulty using R arm for hair grooming/washing.  Patient pleased with progress after treatment.    Objective   See Exercise, Manual, and Modality Logs for complete treatment. Reassessed goals today. Focused on manual stretching shoulders and neck.  Added supine chin tuck, head lift as well as prone extension with alternating rotation of neck to HEP with handout.  Able to groom hair with R hand easily after stretching.  Patient responds well to contract relax stretching into R shoulder flexion with elbow extended.  Also tried contract relax for 90 deg abd with ER.  Patient making good progress toward goals with two met thus far.      Assessment/Plan  Patient independent with HEP in 2 weeks - PROGRESSING  Tolerate 10 min Nustep in 2 weeks - MET  Able to return to fix hair with R arm in 2 weeks - PROGRESSING  Able to use diaphragmatic breathing to relax neck mm in 2 weeks - MET  Improve function as evidenced by QuickDASH score of 20% or less by discharge (43% impairment initially) - NOT MET  Able to regain L shoulder flexion/abd strength overhead by discharge - PROGRESSING  Active R shoulder flexion and abduction to equal L by discharge - PROGRESSING    Progress per Plan of Care up to 20 visits if needed.           Timed:         Manual Therapy:    15     mins  61309;     Therapeutic Exercise:    15     mins  53652;     Neuromuscular Mallorie:    15    mins  20973;    Therapeutic Activity:          mins  59965;     Gait Training:           mins  45777;     Ultrasound:          mins   24253;    Ionto                                   mins   91015  Self Care                            mins   55842      Un-Timed:  Electrical Stimulation:         mins  81269 ( );  Dry Needling          mins self-pay  Traction          mins 96011  Low Eval          Mins  37981  Mod Eval          Mins  54242  High Eval                            Mins  98967  Canalith Repos                   mins  37309    Timed Treatment:   45   mins   Total Treatment:     60   mins    Robin A Sprigler, PT  Physical Therapist

## 2021-07-28 ENCOUNTER — TREATMENT (OUTPATIENT)
Dept: PHYSICAL THERAPY | Facility: CLINIC | Age: 41
End: 2021-07-28

## 2021-07-28 DIAGNOSIS — Z86.73 HISTORY OF COMPLETED STROKE: ICD-10-CM

## 2021-07-28 DIAGNOSIS — Z98.890 HISTORY OF REPAIR OF RIGHT ROTATOR CUFF: ICD-10-CM

## 2021-07-28 DIAGNOSIS — R29.898 SHOULDER WEAKNESS: Primary | ICD-10-CM

## 2021-07-28 DIAGNOSIS — M54.2 NECK PAIN: ICD-10-CM

## 2021-07-28 PROCEDURE — 97110 THERAPEUTIC EXERCISES: CPT | Performed by: PHYSICAL THERAPIST

## 2021-07-28 PROCEDURE — 97140 MANUAL THERAPY 1/> REGIONS: CPT | Performed by: PHYSICAL THERAPIST

## 2021-07-28 NOTE — PROGRESS NOTES
Physical Therapy Daily Progress Note      Patient: Rashmi Diana   : 1980  Diagnosis/ICD-10 Code:  Shoulder weakness [R29.898]   Problems Addressed this Visit     None      Visit Diagnoses     Shoulder weakness    -  Primary    History of completed stroke        History of repair of right rotator cuff        Neck pain          Diagnoses       Codes Comments    Shoulder weakness    -  Primary ICD-10-CM: R29.898  ICD-9-CM: 719.61     History of completed stroke     ICD-10-CM: Z86.73  ICD-9-CM: V12.54     History of repair of right rotator cuff     ICD-10-CM: Z98.890  ICD-9-CM: V15.29     Neck pain     ICD-10-CM: M54.2  ICD-9-CM: 723.1          Referring practitioner: No ref. provider found  Date of Initial Visit: Type: THERAPY  Noted: 2021  Today's Date: 2021    VISIT#: 7    Subjective Patient reports feeling a little better and noticing decreased instances of HA. Does continue to feel weak in shoulders and does have occasional aching in L shoulder affected by stroke.       Objective Dash currently at 38% impairment improved from initial 43% impairment     See Exercise, Manual, and Modality Logs for complete treatment.     Assessment/Plan continued focus on manual stretching of neck and shoulder with base scapular/postural stability for improved functional use. Patient making gradual gains towards goals and will continue to benefit from skilled PT to achieve remaining functional goals.   Patient independent with HEP in 2 weeks - PROGRESSING  Tolerate 10 min Nustep in 2 weeks - MET  Able to return to fix hair with R arm in 2 weeks - PROGRESSING  Able to use diaphragmatic breathing to relax neck mm in 2 weeks - MET  Improve function as evidenced by QuickDASH score of 20% or less by discharge (38% impairment initially) - NOT MET  Able to regain L shoulder flexion/abd strength overhead by discharge - PROGRESSING  Active R shoulder flexion and abduction to equal L by discharge - PROGRESSING    Progress  per Plan of Care and Progress strengthening /stabilization /functional activity         Timed:         Manual Therapy:    15     mins  95420;     Therapeutic Exercise:    25     mins  75509;     Neuromuscular Mallorie:        mins  91490;    Therapeutic Activity:          mins  31895;     Gait Training:           mins  21255;     Ultrasound:          mins  45735;    Ionto                                  mins   52298  Canalith Repos                   mins  67705    Un-Timed:  Electrical Stimulation:         mins  94999 ( );  Dry Needling          mins self-pay  Traction          mins 70153    Timed Treatment:   40   mins   Total Treatment:     50   mins    Bulmaro Monique PTA  Physical Therapist

## 2021-08-09 ENCOUNTER — TREATMENT (OUTPATIENT)
Dept: PHYSICAL THERAPY | Facility: CLINIC | Age: 41
End: 2021-08-09

## 2021-08-09 DIAGNOSIS — Z86.73 HISTORY OF COMPLETED STROKE: ICD-10-CM

## 2021-08-09 DIAGNOSIS — M54.2 NECK PAIN: ICD-10-CM

## 2021-08-09 DIAGNOSIS — R29.898 SHOULDER WEAKNESS: Primary | ICD-10-CM

## 2021-08-09 DIAGNOSIS — Z98.890 HISTORY OF REPAIR OF RIGHT ROTATOR CUFF: ICD-10-CM

## 2021-08-09 PROCEDURE — 97140 MANUAL THERAPY 1/> REGIONS: CPT | Performed by: PHYSICAL THERAPIST

## 2021-08-09 PROCEDURE — 97110 THERAPEUTIC EXERCISES: CPT | Performed by: PHYSICAL THERAPIST

## 2021-08-11 ENCOUNTER — TREATMENT (OUTPATIENT)
Dept: PHYSICAL THERAPY | Facility: CLINIC | Age: 41
End: 2021-08-11

## 2021-08-11 DIAGNOSIS — M54.2 NECK PAIN: ICD-10-CM

## 2021-08-11 DIAGNOSIS — Z98.890 HISTORY OF REPAIR OF RIGHT ROTATOR CUFF: ICD-10-CM

## 2021-08-11 DIAGNOSIS — Z86.73 HISTORY OF COMPLETED STROKE: ICD-10-CM

## 2021-08-11 DIAGNOSIS — R29.898 SHOULDER WEAKNESS: Primary | ICD-10-CM

## 2021-08-11 PROCEDURE — 97140 MANUAL THERAPY 1/> REGIONS: CPT | Performed by: PHYSICAL THERAPIST

## 2021-08-11 PROCEDURE — 97110 THERAPEUTIC EXERCISES: CPT | Performed by: PHYSICAL THERAPIST

## 2021-08-11 NOTE — PROGRESS NOTES
Physical Therapy Daily Progress Note      Patient: Rashmi Diana   : 1980  Diagnosis/ICD-10 Code:  Shoulder weakness [R29.898]   Problems Addressed this Visit     None      Visit Diagnoses     Shoulder weakness    -  Primary    History of completed stroke        History of repair of right rotator cuff        Neck pain          Diagnoses       Codes Comments    Shoulder weakness    -  Primary ICD-10-CM: R29.898  ICD-9-CM: 719.61     History of completed stroke     ICD-10-CM: Z86.73  ICD-9-CM: V12.54     History of repair of right rotator cuff     ICD-10-CM: Z98.890  ICD-9-CM: V15.29     Neck pain     ICD-10-CM: M54.2  ICD-9-CM: 723.1          Referring practitioner: Dylan Valencia, *  Date of Initial Visit: Type: THERAPY  Noted: 2021  Today's Date: 2021    VISIT#: 9    Subjective Patient reports feeling a little sore following angioplasty, discovered left carotid had smooth disected section that is currently healing.       Objective began with mhp, followed by manual interventions, finishing with therapeutic exercise.     See Exercise, Manual, and Modality Logs for complete treatment.     Assessment/Plan patient demonstrated improved tolerance to therapeutic exercise with improved strength in bilateral UE. Patient making gradual gains towards goals with decreased HA reported and improved strength.   Patient independent with HEP in 2 weeks - PROGRESSING  Tolerate 10 min Nustep in 2 weeks - MET  Able to return to fix hair with R arm in 2 weeks - PROGRESSING  Able to use diaphragmatic breathing to relax neck mm in 2 weeks - MET  Improve function as evidenced by QuickDASH score of 20% or less by discharge (38% impairment initially) - NOT MET  Able to regain L shoulder flexion/abd strength overhead by discharge - PROGRESSING  Active R shoulder flexion and abduction to equal L by discharge - PROGRESSING  Progress per Plan of Care and Progress strengthening /stabilization /functional  activity         Timed:         Manual Therapy:    15     mins  88422;     Therapeutic Exercise:    25     mins  91727;     Neuromuscular Mallorie:        mins  78972;    Therapeutic Activity:          mins  12508;     Gait Training:           mins  94532;     Ultrasound:          mins  14883;    Ionto                                   mins   16151  Canalith Repos                   mins  48899    Un-Timed:  Electrical Stimulation:         mins  18262 ( );  Dry Needling          mins self-pay  Traction          mins 44463    Timed Treatment:   40   mins   Total Treatment:     50   mins    Bulmaro Monique PTA  Physical Therapist

## 2021-08-12 ENCOUNTER — OFFICE VISIT (OUTPATIENT)
Dept: ORTHOPEDIC SURGERY | Facility: CLINIC | Age: 41
End: 2021-08-12

## 2021-08-12 VITALS
HEIGHT: 62 IN | HEART RATE: 73 BPM | DIASTOLIC BLOOD PRESSURE: 72 MMHG | WEIGHT: 142 LBS | SYSTOLIC BLOOD PRESSURE: 111 MMHG | BODY MASS INDEX: 26.13 KG/M2

## 2021-08-12 DIAGNOSIS — M25.512 ACUTE PAIN OF LEFT SHOULDER: Primary | ICD-10-CM

## 2021-08-12 DIAGNOSIS — S46.011D TRAUMATIC COMPLETE TEAR OF RIGHT ROTATOR CUFF, SUBSEQUENT ENCOUNTER: ICD-10-CM

## 2021-08-12 PROCEDURE — 99213 OFFICE O/P EST LOW 20 MIN: CPT | Performed by: ORTHOPAEDIC SURGERY

## 2021-08-12 NOTE — PROGRESS NOTES
"     Patient ID: Rashmi Diana is a 41 y.o. female.  Right shoulder pain  12/18/20 right shoulder arthroscopy capsular release  Had cortisone injection for stiffness postoperatively on February 22  Soreness in the left shoulder slowly improving.  Right shoulder is giving her minimal pain at all    Review of Systems:    Right shoulder left shoulder pain improving    Objective:    /72   Pulse 73   Ht 157.5 cm (62\")   Wt 64.4 kg (142 lb)   BMI 25.97 kg/m²     Physical Examination:     Right shoulder demonstrates healed incisions passive elevation 170 abduction 140 external rotation 50.  Active elevation 160.  Belly press and liftoff are 5/5.  Left shoulder demonstrates passive elevation 180 abduction 170 external rotation 40.  No pain or weakness on Speed, Moca, supraspinatus.  Belly press and liftoff are 5/5    Imaging:       Assessment:    Doing well after right shoulder cuff repair with subsequent capsular release  Left shoulder pain bursitis    Plan:   Activity as tolerated for each shoulder.  I would finish out formal therapy transition to home exercise program.  See me as needed      Procedures          Disclaimer: Please note that areas of this note were completed with computer voice recognition software.  Quite often unanticipated grammatical, syntax, homophones, and other interpretive errors are inadvertently transcribed by the computer software. Please excuse any errors that have escaped final proofreading.  "

## 2021-08-16 ENCOUNTER — TREATMENT (OUTPATIENT)
Dept: PHYSICAL THERAPY | Facility: CLINIC | Age: 41
End: 2021-08-16

## 2021-08-16 DIAGNOSIS — Z86.73 HISTORY OF COMPLETED STROKE: ICD-10-CM

## 2021-08-16 DIAGNOSIS — R29.898 SHOULDER WEAKNESS: Primary | ICD-10-CM

## 2021-08-16 DIAGNOSIS — Z98.890 HISTORY OF REPAIR OF RIGHT ROTATOR CUFF: ICD-10-CM

## 2021-08-16 PROCEDURE — 97140 MANUAL THERAPY 1/> REGIONS: CPT | Performed by: PHYSICAL THERAPIST

## 2021-08-16 PROCEDURE — 97110 THERAPEUTIC EXERCISES: CPT | Performed by: PHYSICAL THERAPIST

## 2021-08-16 NOTE — PROGRESS NOTES
Physical Therapy Daily Progress Note    Patient: Rashmi Diana   : 1980  Diagnosis/ICD-10 Code:  Shoulder weakness [R29.898]  Referring practitioner: Dylan Valencia, *  Date of Initial Visit: Type: THERAPY  Noted: 2021  Today's Date: 2021  Patient seen for 10 sessions             Subjective Patient reports she is getting stronger.  Washing her hair with R arm is getting easier, still difficult to reach back of head with elbow forward.    Objective   See Exercise, Manual, and Modality Logs for complete treatment. Added bent over row with 15#.  Patient has 3# weight and 20# weight at home.  VCs to keep R elbow locked straight when elevated.  Still great response to manual contract/relax stretching.  This is why patient needs in-person PT, she cannot do manual stretching/resistance and VCs  to herself at home.  Strength and motion continue to improve.  Able to tolerate level 8 resistance on Nustep.      Assessment/Plan  Patient independent with HEP in 2 weeks - PROGRESSING  Tolerate 10 min Nustep in 2 weeks - MET  Able to return to fix hair with R arm in 2 weeks - PROGRESSING  Able to use diaphragmatic breathing to relax neck mm in 2 weeks - MET  Improve function as evidenced by QuickDASH score of 20% or less by discharge (38% impairment initially) - NOT MET  Able to regain L shoulder flexion/abd strength overhead by discharge - PROGRESSING  Active R shoulder flexion and abduction to equal L by discharge - PROGRESSING     Progress per Plan of Care up to 20 visits if needed.  Patient reports she has 20 visits per calendar year but has exceeded that already.  Discussed talking to a  about a medical review for extension of services.           Timed:         Manual Therapy:    15     mins  10790;     Therapeutic Exercise:    25     mins  04971;     Neuromuscular Mallorie:        mins  77450;    Therapeutic Activity:          mins  35994;     Gait Training:           mins  55424;      Ultrasound:          mins  66242;    Ionto                                   mins   42924  Self Care                            mins   05897      Un-Timed:  Electrical Stimulation:         mins  57339 ( );  Dry Needling          mins self-pay  Traction          mins 06642  Low Eval          Mins  00010  Mod Eval          Mins  83633  High Eval                            Mins  13698  Canalith Repos                   mins  91617    Timed Treatment:   40   mins   Total Treatment:     50   mins    Robin A Sprigler, PT  Physical Therapist

## 2021-08-18 ENCOUNTER — TELEPHONE (OUTPATIENT)
Dept: PHYSICAL THERAPY | Facility: CLINIC | Age: 41
End: 2021-08-18

## 2021-08-23 ENCOUNTER — TREATMENT (OUTPATIENT)
Dept: PHYSICAL THERAPY | Facility: CLINIC | Age: 41
End: 2021-08-23

## 2021-08-23 DIAGNOSIS — R29.898 SHOULDER WEAKNESS: Primary | ICD-10-CM

## 2021-08-23 DIAGNOSIS — Z86.73 HISTORY OF COMPLETED STROKE: ICD-10-CM

## 2021-08-23 DIAGNOSIS — M54.2 NECK PAIN: ICD-10-CM

## 2021-08-23 DIAGNOSIS — Z98.890 HISTORY OF REPAIR OF RIGHT ROTATOR CUFF: ICD-10-CM

## 2021-08-23 PROCEDURE — 97140 MANUAL THERAPY 1/> REGIONS: CPT | Performed by: PHYSICAL THERAPIST

## 2021-08-23 PROCEDURE — 97110 THERAPEUTIC EXERCISES: CPT | Performed by: PHYSICAL THERAPIST

## 2021-08-23 NOTE — PROGRESS NOTES
Physical Therapy Daily Progress Note      Patient: Rashmi Diana   : 1980  Diagnosis/ICD-10 Code:  Shoulder weakness [R29.898]   Problems Addressed this Visit     None      Visit Diagnoses     Shoulder weakness    -  Primary    History of completed stroke        History of repair of right rotator cuff        Neck pain          Diagnoses       Codes Comments    Shoulder weakness    -  Primary ICD-10-CM: R29.898  ICD-9-CM: 719.61     History of completed stroke     ICD-10-CM: Z86.73  ICD-9-CM: V12.54     History of repair of right rotator cuff     ICD-10-CM: Z98.890  ICD-9-CM: V15.29     Neck pain     ICD-10-CM: M54.2  ICD-9-CM: 723.1          Referring practitioner: Dylan Valencia, *  Date of Initial Visit: Type: THERAPY  Noted: 2021  Today's Date: 2021    VISIT#: 11    Subjective Patient reports having increased R UT tightness today and is unsure of cause.       Objective began with MHP, followed by manual interventions, finishing with therapeutic exercise    See Exercise, Manual, and Modality Logs for complete treatment.     Assessment/Plan Patient responded well to manual interventions, demonstrating improved scapular awareness and decreased UT over-activation with therapeutic exercise. Patient will continue to benefit from improved base scapular strengthening and continued improvement with postural awareness  Patient independent with HEP in 2 weeks - PROGRESSING  Tolerate 10 min Nustep in 2 weeks - MET  Able to return to fix hair with R arm in 2 weeks - PROGRESSING  Able to use diaphragmatic breathing to relax neck mm in 2 weeks - MET  Improve function as evidenced by QuickDASH score of 20% or less by discharge (38% impairment initially) - NOT MET  Able to regain L shoulder flexion/abd strength overhead by discharge - PROGRESSING  Active R shoulder flexion and abduction to equal L by discharge - PROGRESSING    Progress per Plan of Care         Timed:         Manual Therapy:    15      mins  03013;     Therapeutic Exercise:    25     mins  54498;     Neuromuscular Mallorie:        mins  70195;    Therapeutic Activity:          mins  42679;     Gait Training:           mins  51484;     Ultrasound:          mins  24045;    Ionto                                   mins   52073  Canalith Repos                   mins  19082    Un-Timed:  Electrical Stimulation:         mins  37946 ( );  Dry Needling          mins self-pay  Traction          mins 17305    Timed Treatment:   40   mins   Total Treatment:     50   mins    Bulmaro Monique PTA  Physical Therapist

## 2021-08-25 ENCOUNTER — TREATMENT (OUTPATIENT)
Dept: PHYSICAL THERAPY | Facility: CLINIC | Age: 41
End: 2021-08-25

## 2021-08-25 DIAGNOSIS — Z98.890 HISTORY OF REPAIR OF RIGHT ROTATOR CUFF: ICD-10-CM

## 2021-08-25 DIAGNOSIS — M54.2 NECK PAIN: ICD-10-CM

## 2021-08-25 DIAGNOSIS — Z86.73 HISTORY OF COMPLETED STROKE: ICD-10-CM

## 2021-08-25 DIAGNOSIS — R29.898 SHOULDER WEAKNESS: Primary | ICD-10-CM

## 2021-08-25 PROCEDURE — 97110 THERAPEUTIC EXERCISES: CPT | Performed by: PHYSICAL THERAPIST

## 2021-08-25 PROCEDURE — 97140 MANUAL THERAPY 1/> REGIONS: CPT | Performed by: PHYSICAL THERAPIST

## 2021-08-25 NOTE — PROGRESS NOTES
Physical Therapy Daily Progress Note      Patient: Rashmi Diana   : 1980  Diagnosis/ICD-10 Code:  Shoulder weakness [R29.898]   Problems Addressed this Visit     None      Visit Diagnoses     Shoulder weakness    -  Primary    History of completed stroke        History of repair of right rotator cuff        Neck pain          Diagnoses       Codes Comments    Shoulder weakness    -  Primary ICD-10-CM: R29.898  ICD-9-CM: 719.61     History of completed stroke     ICD-10-CM: Z86.73  ICD-9-CM: V12.54     History of repair of right rotator cuff     ICD-10-CM: Z98.890  ICD-9-CM: V15.29     Neck pain     ICD-10-CM: M54.2  ICD-9-CM: 723.1          Referring practitioner: Dylan Valencia, *  Date of Initial Visit: Type: THERAPY  Noted: 2021  Today's Date: 2021    VISIT#: 12    Subjective Patient reports feeling a little stiff today in her neck, but overall feeling a little better.       Objective began with MHP, followed by manual interventions, finishing therapeutic exercise.     See Exercise, Manual, and Modality Logs for complete treatment.     Assessment/Plan Patient responded well to manual interventions with decreased symptoms reported. Patient demonstrated improved scapular/postural awareness with therapeutic exercise and making gradual gains towards goals at this time.   Patient independent with HEP in 2 weeks - MET  Tolerate 10 min Nustep in 2 weeks - MET  Able to return to fix hair with R arm in 2 weeks - PROGRESSING  Able to use diaphragmatic breathing to relax neck mm in 2 weeks - MET  Improve function as evidenced by QuickDASH score of 20% or less by discharge (38% impairment initially) - NOT MET  Able to regain L shoulder flexion/abd strength overhead by discharge - PROGRESSING  Active R shoulder flexion and abduction to equal L by discharge - PROGRESSING    Progress per Plan of Care and Progress strengthening /stabilization /functional activity         Timed:         Manual  Therapy:    15     mins  42788;     Therapeutic Exercise:    25     mins  02652;     Neuromuscular Mallorie:        mins  21562;    Therapeutic Activity:          mins  27431;     Gait Training:           mins  93274;     Ultrasound:          mins  92466;    Ionto                                   mins   25560  Canalith Repos                   mins  08330    Un-Timed:  Electrical Stimulation:         mins  23811 ( );  Dry Needling          mins self-pay  Traction          mins 37557    Timed Treatment:   40   mins   Total Treatment:     50   mins    Bulmaro Monique PTA  Physical Therapist

## 2021-08-30 ENCOUNTER — TREATMENT (OUTPATIENT)
Dept: PHYSICAL THERAPY | Facility: CLINIC | Age: 41
End: 2021-08-30

## 2021-08-30 DIAGNOSIS — Z86.73 HISTORY OF COMPLETED STROKE: ICD-10-CM

## 2021-08-30 DIAGNOSIS — M54.2 NECK PAIN: ICD-10-CM

## 2021-08-30 DIAGNOSIS — R29.898 SHOULDER WEAKNESS: Primary | ICD-10-CM

## 2021-08-30 DIAGNOSIS — Z98.890 HISTORY OF REPAIR OF RIGHT ROTATOR CUFF: ICD-10-CM

## 2021-08-30 PROCEDURE — PTSPMIN2 PR PHYS THER SP 16 TO 30 MINUTES: Performed by: PHYSICAL THERAPIST

## 2021-08-30 NOTE — PROGRESS NOTES
Physical Therapy Daily Progress Note      Patient: Rashmi Diana   : 1980  Diagnosis/ICD-10 Code:  Shoulder weakness [R29.898]   Problems Addressed this Visit     None      Visit Diagnoses     Shoulder weakness    -  Primary    History of completed stroke        History of repair of right rotator cuff        Neck pain          Diagnoses       Codes Comments    Shoulder weakness    -  Primary ICD-10-CM: R29.898  ICD-9-CM: 719.61     History of completed stroke     ICD-10-CM: Z86.73  ICD-9-CM: V12.54     History of repair of right rotator cuff     ICD-10-CM: Z98.890  ICD-9-CM: V15.29     Neck pain     ICD-10-CM: M54.2  ICD-9-CM: 723.1          Referring practitioner: Dylan Valencia, *  Date of Initial Visit: Type: THERAPY  Noted: 2021  Today's Date: 2021    VISIT#: 13    Subjective Patient reports feeling some increased neck stiffness today, unsure of cause, reports having continued weakness with raising arm overhead, still difficult to groom hair.       Objective began with MHP, followed by manual interventions, finishing with therapeutic exercise.     See Exercise, Manual, and Modality Logs for complete treatment.     Assessment/Plan Patient responded well to manual interventions with decreased tightness reported. Continues to demonstrate improved scapular/postural awareness with therapeutic exercise and is making gradual gains towards goals at this time.   Patient independent with HEP in 2 weeks - MET  Tolerate 10 min Nustep in 2 weeks - MET  Able to return to fix hair with R arm in 2 weeks - PROGRESSING  Able to use diaphragmatic breathing to relax neck mm in 2 weeks - MET  Improve function as evidenced by QuickDASH score of 20% or less by discharge (38% impairment initially) - NOT MET  Able to regain L shoulder flexion/abd strength overhead by discharge - PROGRESSING  Active R shoulder flexion and abduction to equal L by discharge - PROGRESSING  Progress per Plan of Care         Timed:          Manual Therapy:    15     mins  65441;     Therapeutic Exercise:    20     mins  39164;     Neuromuscular Mallorie:        mins  67162;    Therapeutic Activity:          mins  97996;     Gait Training:           mins  73000;     Ultrasound:          mins  50260;    Ionto                                   mins   13935  Canalith Repos                   mins  51672    Un-Timed:  Electrical Stimulation:        mins  96704 ( );  Dry Needling          mins self-pay  Traction          mins 73296    Timed Treatment:   35   mins   Total Treatment:     45   mins    Bulmaro Monique PTA  Physical Therapist

## 2021-09-01 ENCOUNTER — TREATMENT (OUTPATIENT)
Dept: PHYSICAL THERAPY | Facility: CLINIC | Age: 41
End: 2021-09-01

## 2021-09-01 DIAGNOSIS — R29.898 SHOULDER WEAKNESS: Primary | ICD-10-CM

## 2021-09-01 DIAGNOSIS — M54.2 NECK PAIN: ICD-10-CM

## 2021-09-01 DIAGNOSIS — Z98.890 HISTORY OF REPAIR OF RIGHT ROTATOR CUFF: ICD-10-CM

## 2021-09-01 DIAGNOSIS — Z86.73 HISTORY OF COMPLETED STROKE: ICD-10-CM

## 2021-09-01 PROCEDURE — PTSPMIN2 PR PHYS THER SP 16 TO 30 MINUTES: Performed by: PHYSICAL THERAPIST

## 2021-09-01 NOTE — PROGRESS NOTES
Physical Therapy Daily Progress Note      Patient: Rashmi Diana   : 1980  Diagnosis/ICD-10 Code:  Shoulder weakness [R29.898]   Problems Addressed this Visit     None      Visit Diagnoses     Shoulder weakness    -  Primary    History of completed stroke        History of repair of right rotator cuff        Neck pain          Diagnoses       Codes Comments    Shoulder weakness    -  Primary ICD-10-CM: R29.898  ICD-9-CM: 719.61     History of completed stroke     ICD-10-CM: Z86.73  ICD-9-CM: V12.54     History of repair of right rotator cuff     ICD-10-CM: Z98.890  ICD-9-CM: V15.29     Neck pain     ICD-10-CM: M54.2  ICD-9-CM: 723.1          Referring practitioner: Dylan Valencia, *  Date of Initial Visit: Type: THERAPY  Noted: 2021  Today's Date: 2021    VISIT#: 14    Subjective Patient reports having increased tightness on L UT/ neck area. Patient unsure of cause of increased tightness.       Objective     See Exercise, Manual, and Modality Logs for complete treatment.     Assessment/Plan Patient with good response to manual interventions with decreased symptoms reported. Patient will continue to benefit from improved scapular/postural awareness.   Patient independent with HEP in 2 weeks - MET  Tolerate 10 min Nustep in 2 weeks - MET  Able to return to fix hair with R arm in 2 weeks - PROGRESSING  Able to use diaphragmatic breathing to relax neck mm in 2 weeks - MET  Improve function as evidenced by QuickDASH score of 20% or less by discharge (38% impairment initially) - NOT MET  Able to regain L shoulder flexion/abd strength overhead by discharge - PROGRESSING  Active R shoulder flexion and abduction to equal L by discharge - PROGRESSING    Progress per Plan of Care         Timed:         Manual Therapy:    15     mins  28627;     Therapeutic Exercise:    20     mins  35744;     Neuromuscular Mallorie:        mins  69830;    Therapeutic Activity:          mins  42304;     Gait Training:            mins  10350;     Ultrasound:          mins  78378;    Ionto                                   mins   86701  Canalith Repos                   mins  11489    Un-Timed:  Electrical Stimulation:         mins  85803 ( );  Dry Needling          mins self-pay  Traction          mins 23543    Timed Treatment:   35   mins   Total Treatment:     45   mins    Bulmaro Monique PTA  Physical Therapist

## 2021-09-08 ENCOUNTER — TREATMENT (OUTPATIENT)
Dept: PHYSICAL THERAPY | Facility: CLINIC | Age: 41
End: 2021-09-08

## 2021-09-08 DIAGNOSIS — Z86.73 HISTORY OF COMPLETED STROKE: ICD-10-CM

## 2021-09-08 DIAGNOSIS — R29.898 SHOULDER WEAKNESS: Primary | ICD-10-CM

## 2021-09-08 DIAGNOSIS — M54.2 NECK PAIN: ICD-10-CM

## 2021-09-08 DIAGNOSIS — Z98.890 HISTORY OF REPAIR OF RIGHT ROTATOR CUFF: ICD-10-CM

## 2021-09-08 PROCEDURE — PTSPMIN2 PR PHYS THER SP 16 TO 30 MINUTES: Performed by: PHYSICAL THERAPIST

## 2021-09-08 NOTE — PROGRESS NOTES
Physical Therapy Daily Progress Note      Patient: Rashmi Diana   : 1980  Diagnosis/ICD-10 Code:  Shoulder weakness [R29.898]   Problems Addressed this Visit     None      Visit Diagnoses     Shoulder weakness    -  Primary    History of completed stroke        History of repair of right rotator cuff        Neck pain          Diagnoses       Codes Comments    Shoulder weakness    -  Primary ICD-10-CM: R29.898  ICD-9-CM: 719.61     History of completed stroke     ICD-10-CM: Z86.73  ICD-9-CM: V12.54     History of repair of right rotator cuff     ICD-10-CM: Z98.890  ICD-9-CM: V15.29     Neck pain     ICD-10-CM: M54.2  ICD-9-CM: 723.1          Referring practitioner: Dylan Valencia, *  Date of Initial Visit: Type: THERAPY  Noted: 2021  Today's Date: 2021    VISIT#: 15    Subjective Patient reports having increased R shoulder/neck tightness, interested in performing dry needling if able.       Objective TDN performed by Minnie Kathleen PT     See Exercise, Manual, and Modality Logs for complete treatment.     Assessment/Plan Patient responded well to manual interventions with with decreased symptoms. Patient with increased UT over-activation with therapeutic exercise performed and required verbal/tactile cues for correction.   Patient independent with HEP in 2 weeks - MET  Tolerate 10 min Nustep in 2 weeks - MET  Able to return to fix hair with R arm in 2 weeks - PROGRESSING  Able to use diaphragmatic breathing to relax neck mm in 2 weeks - MET  Improve function as evidenced by QuickDASH score of 20% or less by discharge (38% impairment initially) - NOT MET  Able to regain L shoulder flexion/abd strength overhead by discharge - PROGRESSING  Active R shoulder flexion and abduction to equal L by discharge - PROGRESSING  Progress per Plan of Care         Timed:         Manual Therapy:    15     mins  10959;     Therapeutic Exercise:    20     mins  36361;     Neuromuscular Mallorie:        mins   27871;    Therapeutic Activity:          mins  04521;     Gait Training:           mins  45497;     Ultrasound:          mins  95640;    Ionto                                   mins   33399  Canalith Repos                   mins  02400    Un-Timed:  Electrical Stimulation:         mins  86694 ( );  Dry Needling    15     mins self-pay  Traction          mins 84082    Timed Treatment:   50   mins   Total Treatment:     60   mins    Bulmaro Monique PTA  Physical Therapist

## 2021-09-10 NOTE — PROGRESS NOTES
Dry needling completed with written consent to following areas: B suboccipitals, B cervical paraspinals ~C4-5, thoracic paraspinals L ~T3-4, B UT and L dorsal scapular. Good response to all needling and pt reported decreased pain after needling.    Minnie Kathleen, PT

## 2021-09-22 ENCOUNTER — TREATMENT (OUTPATIENT)
Dept: PHYSICAL THERAPY | Facility: CLINIC | Age: 41
End: 2021-09-22

## 2021-09-22 DIAGNOSIS — R29.898 SHOULDER WEAKNESS: Primary | ICD-10-CM

## 2021-09-22 DIAGNOSIS — Z86.73 HISTORY OF COMPLETED STROKE: ICD-10-CM

## 2021-09-22 DIAGNOSIS — M54.2 NECK PAIN: ICD-10-CM

## 2021-09-22 DIAGNOSIS — Z98.890 HISTORY OF REPAIR OF RIGHT ROTATOR CUFF: ICD-10-CM

## 2021-09-22 PROCEDURE — PTSPMIN2 PR PHYS THER SP 16 TO 30 MINUTES: Performed by: PHYSICAL THERAPIST

## 2021-09-22 NOTE — PROGRESS NOTES
Physical Therapy Daily Progress Note      Patient: Rashmi Diana   : 1980  Diagnosis/ICD-10 Code:  Shoulder weakness [R29.898]   Problems Addressed this Visit     None      Visit Diagnoses     Shoulder weakness    -  Primary    History of completed stroke        History of repair of right rotator cuff        Neck pain          Diagnoses       Codes Comments    Shoulder weakness    -  Primary ICD-10-CM: R29.898  ICD-9-CM: 719.61     History of completed stroke     ICD-10-CM: Z86.73  ICD-9-CM: V12.54     History of repair of right rotator cuff     ICD-10-CM: Z98.890  ICD-9-CM: V15.29     Neck pain     ICD-10-CM: M54.2  ICD-9-CM: 723.1          Referring practitioner: Dylan Valencia, *  Date of Initial Visit: Type: THERAPY  Noted: 2021  Today's Date: 2021    VISIT#: 16    Subjective Patient reports having mid back/neck pain mainly on L side today, has been bothering her for a couple days.       Objective added thoracic extension supine with pool noodle.     See Exercise, Manual, and Modality Logs for complete treatment.     Assessment/Plan Patient responded well to added thoracic extension stretch with decreased symptoms reported. Patient able to complete therapeutic exercise with no increased symptoms and improved scapular/postural awareness.   Patient independent with HEP in 2 weeks - MET  Tolerate 10 min Nustep in 2 weeks - MET  Able to return to fix hair with R arm in 2 weeks - PROGRESSING  Able to use diaphragmatic breathing to relax neck mm in 2 weeks - MET  Improve function as evidenced by QuickDASH score of 20% or less by discharge (38% impairment initially) - NOT MET  Able to regain L shoulder flexion/abd strength overhead by discharge - PROGRESSING  Active R shoulder flexion and abduction to equal L by discharge - PROGRESSING    Progress per Plan of Care         Timed:         Manual Therapy:    15     mins  64123;     Therapeutic Exercise:    20     mins  63499;     Neuromuscular  Mallorie:        mins  68828;    Therapeutic Activity:          mins  50849;     Gait Training:           mins  66171;     Ultrasound:          mins  99845;    Ionto                                   mins   45163  Canalith Repos                   mins  89789    Un-Timed:  Electrical Stimulation:         mins  44392 ( );  Dry Needling          mins self-pay  Traction          mins 58987    Timed Treatment:   35   mins   Total Treatment:     45   mins    Bulmaro Monique PTA  Physical Therapist

## 2021-09-29 ENCOUNTER — TREATMENT (OUTPATIENT)
Dept: PHYSICAL THERAPY | Facility: CLINIC | Age: 41
End: 2021-09-29

## 2021-09-29 DIAGNOSIS — R29.898 SHOULDER WEAKNESS: Primary | ICD-10-CM

## 2021-09-29 DIAGNOSIS — Z98.890 HISTORY OF REPAIR OF RIGHT ROTATOR CUFF: ICD-10-CM

## 2021-09-29 DIAGNOSIS — Z86.73 HISTORY OF COMPLETED STROKE: ICD-10-CM

## 2021-09-29 DIAGNOSIS — M54.2 NECK PAIN: ICD-10-CM

## 2021-09-29 PROCEDURE — PTSPMIN2 PR PHYS THER SP 16 TO 30 MINUTES: Performed by: PHYSICAL THERAPIST

## 2021-09-29 NOTE — PROGRESS NOTES
Physical Therapy Daily Progress Note      Patient: Rashmi Diana   : 1980  Diagnosis/ICD-10 Code:  Shoulder weakness [R29.898]   Problems Addressed this Visit     None      Visit Diagnoses     Shoulder weakness    -  Primary    History of completed stroke        History of repair of right rotator cuff        Neck pain          Diagnoses       Codes Comments    Shoulder weakness    -  Primary ICD-10-CM: R29.898  ICD-9-CM: 719.61     History of completed stroke     ICD-10-CM: Z86.73  ICD-9-CM: V12.54     History of repair of right rotator cuff     ICD-10-CM: Z98.890  ICD-9-CM: V15.29     Neck pain     ICD-10-CM: M54.2  ICD-9-CM: 723.1          Referring practitioner: Dylan Valencia, *  Date of Initial Visit: Type: THERAPY  Noted: 2021  Today's Date: 2021    VISIT#: 17    Subjective Patient reports neck is feeling a little better since performing added stretch to HEP. Patient pleased with current progress at this time.       Objective     See Exercise, Manual, and Modality Logs for complete treatment.     Assessment/Plan Patient demonstrated improved active shoulder flexion post stretch and continues to gradually demonstrate improved scapular awareness/mechanics   Patient independent with HEP in 2 weeks - MET  Tolerate 10 min Nustep in 2 weeks - MET  Able to return to fix hair with R arm in 2 weeks - PROGRESSING  Able to use diaphragmatic breathing to relax neck mm in 2 weeks - MET  Improve function as evidenced by QuickDASH score of 20% or less by discharge (38% impairment initially) - NOT MET  Able to regain L shoulder flexion/abd strength overhead by discharge - PROGRESSING  Active R shoulder flexion and abduction to equal L by discharge - PROGRESSING    Progress per Plan of Care         Timed:         Manual Therapy:    15     mins  80350;     Therapeutic Exercise:    20     mins  87036;     Neuromuscular Mallorie:        mins  75561;    Therapeutic Activity:          mins  88594;     Gait  Training:           mins  51928;     Ultrasound:          mins  33277;    Ionto                                   mins   99243  Canalith Repos                   mins  04739    Un-Timed:  Electrical Stimulation:         mins  97322 ( );  Dry Needling          mins self-pay  Traction          mins 71928    Timed Treatment:   35   mins   Total Treatment:     45   mins    Bulmaro Monique PTA  Physical Therapist

## 2021-10-06 ENCOUNTER — TREATMENT (OUTPATIENT)
Dept: PHYSICAL THERAPY | Facility: CLINIC | Age: 41
End: 2021-10-06

## 2021-10-06 DIAGNOSIS — M54.2 NECK PAIN: ICD-10-CM

## 2021-10-06 DIAGNOSIS — Z86.73 HISTORY OF COMPLETED STROKE: ICD-10-CM

## 2021-10-06 DIAGNOSIS — Z98.890 HISTORY OF REPAIR OF RIGHT ROTATOR CUFF: ICD-10-CM

## 2021-10-06 DIAGNOSIS — R29.898 SHOULDER WEAKNESS: Primary | ICD-10-CM

## 2021-10-06 PROCEDURE — PTSPMIN2 PR PHYS THER SP 16 TO 30 MINUTES: Performed by: PHYSICAL THERAPIST

## 2021-10-06 NOTE — PROGRESS NOTES
Physical Therapy Daily Progress Note      Patient: Rashmi Diana   : 1980  Diagnosis/ICD-10 Code:  Shoulder weakness [R29.898]   Problems Addressed this Visit     None      Visit Diagnoses     Shoulder weakness    -  Primary    History of completed stroke        History of repair of right rotator cuff        Neck pain          Diagnoses       Codes Comments    Shoulder weakness    -  Primary ICD-10-CM: R29.898  ICD-9-CM: 719.61     History of completed stroke     ICD-10-CM: Z86.73  ICD-9-CM: V12.54     History of repair of right rotator cuff     ICD-10-CM: Z98.890  ICD-9-CM: V15.29     Neck pain     ICD-10-CM: M54.2  ICD-9-CM: 723.1          Referring practitioner: Dylan Valencia, *  Date of Initial Visit: Type: THERAPY  Noted: 2021  Today's Date: 10/6/2021    VISIT#: 18    Subjective Patient reports feeling mild stiffness in R shoulder/side of neck today.       Objective     See Exercise, Manual, and Modality Logs for complete treatment.     Assessment/Plan Patient responded well to manual interventions with decreased stiffness reported and improved functional mobility. Patient making gradual gains towards goals at this time and demonstrating improved scapular awareness with therapeutic exercise.   Patient independent with HEP in 2 weeks - MET  Tolerate 10 min Nustep in 2 weeks - MET  Able to return to fix hair with R arm in 2 weeks - PROGRESSING  Able to use diaphragmatic breathing to relax neck mm in 2 weeks - MET  Improve function as evidenced by QuickDASH score of 20% or less by discharge (38% impairment initially) - NOT MET  Able to regain L shoulder flexion/abd strength overhead by discharge - PROGRESSING  Active R shoulder flexion and abduction to equal L by discharge - PROGRESSING    Progress per Plan of Care and Progress strengthening /stabilization /functional activity         Timed:         Manual Therapy:    15     mins  97949;     Therapeutic Exercise:    20     mins  36117;      Neuromuscular Mallorie:        mins  29951;    Therapeutic Activity:          mins  17675;     Gait Training:           mins  17182;     Ultrasound:          mins  56899;    Ionto                                   mins   61821  Canalith Repos                   mins  32473    Un-Timed:  Electrical Stimulation:         mins  25041 ( );  Dry Needling          mins self-pay  Traction          mins 01050    Timed Treatment:   35   mins   Total Treatment:     45   mins    Bulmaro Monique PTA  Physical Therapist

## 2021-10-20 ENCOUNTER — TREATMENT (OUTPATIENT)
Dept: PHYSICAL THERAPY | Facility: CLINIC | Age: 41
End: 2021-10-20

## 2021-10-20 DIAGNOSIS — Z98.890 HISTORY OF REPAIR OF RIGHT ROTATOR CUFF: ICD-10-CM

## 2021-10-20 DIAGNOSIS — Z86.73 HISTORY OF COMPLETED STROKE: ICD-10-CM

## 2021-10-20 DIAGNOSIS — R29.898 SHOULDER WEAKNESS: Primary | ICD-10-CM

## 2021-10-20 DIAGNOSIS — M54.2 NECK PAIN: ICD-10-CM

## 2021-10-20 PROCEDURE — PTSPMIN2 PR PHYS THER SP 16 TO 30 MINUTES: Performed by: PHYSICAL THERAPIST

## 2021-10-20 NOTE — PROGRESS NOTES
Physical Therapy Daily Progress Note      Patient: Rashmi Diana   : 1980  Diagnosis/ICD-10 Code:  Shoulder weakness [R29.898]   Problems Addressed this Visit     None      Visit Diagnoses     Shoulder weakness    -  Primary    History of completed stroke        History of repair of right rotator cuff        Neck pain          Diagnoses       Codes Comments    Shoulder weakness    -  Primary ICD-10-CM: R29.898  ICD-9-CM: 719.61     History of completed stroke     ICD-10-CM: Z86.73  ICD-9-CM: V12.54     History of repair of right rotator cuff     ICD-10-CM: Z98.890  ICD-9-CM: V15.29     Neck pain     ICD-10-CM: M54.2  ICD-9-CM: 723.1          Referring practitioner: Dylan Valencia, *  Date of Initial Visit: Type: THERAPY  Noted: 2021  Today's Date: 10/20/2021    VISIT#: 19    Subjective Patient reports being able to finally put hair in pony tail without having to bend neck. Patient reports continuing to feel weak with flexion above 90 degrees.       Objective reviewed HEP active stretches and strengthening exercise for improved flexion above 90 degrees.     See Exercise, Manual, and Modality Logs for complete treatment.     Assessment/Plan Patient responded well to manual interventions, with mild end range tightness with ER. Patient provided proper return demonstration with reviewed HEP. Patient will continue to benefit from improved ER and continued base scapular strengthening for continued improvements with daily functional activity.   Patient independent with HEP in 2 weeks - MET  Tolerate 10 min Nustep in 2 weeks - MET  Able to return to fix hair with R arm in 2 weeks - PROGRESSING  Able to use diaphragmatic breathing to relax neck mm in 2 weeks - MET  Improve function as evidenced by QuickDASH score of 20% or less by discharge (38% impairment initially) - NOT MET  Able to regain L shoulder flexion/abd strength overhead by discharge - PROGRESSING  Active R shoulder flexion and abduction to  equal L by discharge - PROGRESSING    Progress per Plan of Care and Progress strengthening /stabilization /functional activity         Timed:         Manual Therapy:    15     mins  12844;     Therapeutic Exercise:    20     mins  08874;     Neuromuscular Mallorie:        mins  50167;    Therapeutic Activity:          mins  57562;     Gait Training:           mins  11755;     Ultrasound:          mins  94724;    Ionto                                  mins   81556  Canalith Repos                   mins  78871    Un-Timed:  Electrical Stimulation:         mins  13727 ( );  Dry Needling          mins self-pay  Traction          mins 61523    Timed Treatment:   35   mins   Total Treatment:     45   mins    Bulmaro Monique PTA  Physical Therapist

## 2021-11-10 ENCOUNTER — TREATMENT (OUTPATIENT)
Dept: PHYSICAL THERAPY | Facility: CLINIC | Age: 41
End: 2021-11-10

## 2021-11-10 DIAGNOSIS — R29.898 SHOULDER WEAKNESS: Primary | ICD-10-CM

## 2021-11-10 DIAGNOSIS — Z98.890 HISTORY OF REPAIR OF RIGHT ROTATOR CUFF: ICD-10-CM

## 2021-11-10 DIAGNOSIS — M54.2 NECK PAIN: ICD-10-CM

## 2021-11-10 DIAGNOSIS — Z86.73 HISTORY OF COMPLETED STROKE: ICD-10-CM

## 2021-11-10 PROCEDURE — PTSPMIN1 PR PHYS THER SP UP TO 15 MINUTES: Performed by: PHYSICAL THERAPIST

## 2021-11-10 NOTE — PROGRESS NOTES
Physical Therapy Daily Progress Note      Patient: Rashmi Diana   : 1980  Diagnosis/ICD-10 Code:  Shoulder weakness [R29.898]   Problems Addressed this Visit     None      Visit Diagnoses     Shoulder weakness    -  Primary    History of completed stroke        History of repair of right rotator cuff        Neck pain          Diagnoses       Codes Comments    Shoulder weakness    -  Primary ICD-10-CM: R29.898  ICD-9-CM: 719.61     History of completed stroke     ICD-10-CM: Z86.73  ICD-9-CM: V12.54     History of repair of right rotator cuff     ICD-10-CM: Z98.890  ICD-9-CM: V15.29     Neck pain     ICD-10-CM: M54.2  ICD-9-CM: 723.1          Referring practitioner: Dylan Valencia, *  Date of Initial Visit: Type: THERAPY  Noted: 2021  Today's Date: 11/10/2021    VISIT#: 20    Subjective Patient reports overall feeling good and expresses readiness for discharge, would like to stretch and review HEP.       Objective reviewed HEP    See Exercise, Manual, and Modality Logs for complete treatment.     Assessment/Plan Patient provided proper return demonstration with reviewed HEP and demonstrated improved passive ER to 70 degrees.   Patient independent with HEP in 2 weeks - MET  Tolerate 10 min Nustep in 2 weeks - MET  Able to return to fix hair with R arm in 2 weeks - PROGRESSING  Able to use diaphragmatic breathing to relax neck mm in 2 weeks - MET  Improve function as evidenced by QuickDASH score of 20% or less by discharge (38% impairment initially) - NOT MET  Able to regain L shoulder flexion/abd strength overhead by discharge - MET  Active R shoulder flexion and abduction to equal L by discharge - MET    Plan to DC with HEP         Timed:         Manual Therapy:    15     mins  43450;     Therapeutic Exercise:    5     mins  37055;     Neuromuscular Mallorie:        mins  56626;    Therapeutic Activity:          mins  62826;     Gait Training:          mins  77078;     Ultrasound:          mins   76704;    Ionto                                   mins   33033  Self Care                    _____  mins   32223  Canalith Repos                   mins  59856    Un-Timed:  Electrical Stimulation:         mins  24734 ( );  Dry Needling          mins self-pay  Traction          mins 52727    Timed Treatment:   20   mins   Total Treatment:     20   mins    Bulmaro Monique PTA  [place] [id number]  Physical Therapist Assistant

## 2021-12-09 ENCOUNTER — DOCUMENTATION (OUTPATIENT)
Dept: PHYSICAL THERAPY | Facility: CLINIC | Age: 41
End: 2021-12-09

## 2021-12-09 DIAGNOSIS — Z98.890 HISTORY OF REPAIR OF RIGHT ROTATOR CUFF: ICD-10-CM

## 2021-12-09 DIAGNOSIS — R29.898 SHOULDER WEAKNESS: Primary | ICD-10-CM

## 2021-12-09 DIAGNOSIS — Z86.73 HISTORY OF COMPLETED STROKE: ICD-10-CM

## 2021-12-09 NOTE — PROGRESS NOTES
Discharge Summary  Discharge Summary from Physical Therapy Report          Goals: All Met    Discharge Plan: Continue with current home exercise program as instructed    Comments  Last seen 11/10/2021     VISIT#: 20     Subjective Patient reports overall feeling good and expresses readiness for discharge, would like to stretch and review HEP.         Objective reviewed HEP     See Exercise, Manual, and Modality Logs for complete treatment.      Assessment/Plan Patient provided proper return demonstration with reviewed HEP and demonstrated improved passive ER to 70 degrees.   Patient independent with HEP in 2 weeks - MET  Tolerate 10 min Nustep in 2 weeks - MET  Able to return to fix hair with R arm in 2 weeks - PROGRESSING  Able to use diaphragmatic breathing to relax neck mm in 2 weeks - MET  Improve function as evidenced by QuickDASH score of 20% or less by discharge (38% impairment initially) - NOT MET  Able to regain L shoulder flexion/abd strength overhead by discharge - MET  Active R shoulder flexion and abduction to equal L by discharge - MET     Plan to DC with HEP    Date of Discharge 12/9/21        Robin A Sprigler, PT  Physical Therapist

## 2022-04-15 NOTE — PROGRESS NOTES
Physical Therapy Daily Progress Note    Patient: Rashmi Diana   : 1980  Diagnosis/ICD-10 Code:  Shoulder weakness [R29.898]  Referring practitioner: Dylan Valencia, *  Date of Initial Visit: Type: THERAPY  Noted: 2021  Today's Date: 2021  Patient seen for 8 sessions             Subjective angiogram completed , disection L coratid A. Noticed, smooth but not healed.  R side disection is healed.  Patient is scared that she is going to have another stroke and c/o tenderness. Patient reports feeling a little better and noticing decreased instances of HA. Does continue to feel weak in shoulders and does have occasional aching in L shoulder affected by stroke.     Objective   See Exercise, Manual, and Modality Logs for complete treatment. Dash currently at 38% impairment improved from initial 43% impairment. Assessed tender area, likely lymph node processing dye.   Able to maintain overhead elevation and touch top of head.  Strength improving in R shoulder, able to progress from 3# to 8# for chest press and overhead flexion stretch.  Responds well to contract relax techniques with R shoulder end-range flexion and external rotation.      Assessment/Plan  Patient independent with HEP in 2 weeks - PROGRESSING  Tolerate 10 min Nustep in 2 weeks - MET  Able to return to fix hair with R arm in 2 weeks - PROGRESSING  Able to use diaphragmatic breathing to relax neck mm in 2 weeks - MET  Improve function as evidenced by QuickDASH score of 20% or less by discharge (38% impairment initially) - NOT MET  Able to regain L shoulder flexion/abd strength overhead by discharge - PROGRESSING  Active R shoulder flexion and abduction to equal L by discharge - PROGRESSING    Progress per Plan of Care up to 20 visits if needed.           Timed:         Manual Therapy:    15     mins  48073;     Therapeutic Exercise:    25     mins  61453;     Neuromuscular Mallorie:        mins  50468;    Therapeutic Activity:     Chief Complaint   Patient presents with    Hypertension     follow up        1. \"Have you been to the ER, urgent care clinic since your last visit? Hospitalized since your last visit? \" No    2. \"Have you seen or consulted any other health care providers outside of the [address] [place] [address] since your last visit? \" No     3. For patients aged 39-70: Has the patient had a colonoscopy / FIT/ Cologuard? Yes - no Care Gap present  Cologuard normal     If the patient is female:    4. For patients aged 41-77: Has the patient had a mammogram within the past 2 years? NA - based on age or sex      11. For patients aged 21-65: Has the patient had a pap smear?  NA - based on age or sex    Health Maintenance Due   Topic Date Due    Shingrix Vaccine Age 49> (1 of 2) Never done    COVID-19 Vaccine (2 - Pfizer 3-dose series) 12/23/2021       mins  94112;     Gait Training:           mins  35833;     Ultrasound:          mins  71424;    Ionto                                   mins   37644  Self Care                            mins   42739      Un-Timed:  Electrical Stimulation:         mins  82852 ( );  Dry Needling          mins self-pay  Traction          mins 02594  Low Eval          Mins  47914  Mod Eval          Mins  55760  High Eval                            Mins  00991  Canalith Repos                   mins  44632    Timed Treatment:   40   mins   Total Treatment:     50   mins    Robin A Sprigler, PT  Physical Therapist

## 2023-10-30 ENCOUNTER — OFFICE VISIT (OUTPATIENT)
Dept: SPORTS MEDICINE | Facility: CLINIC | Age: 43
End: 2023-10-30
Payer: COMMERCIAL

## 2023-10-30 VITALS — OXYGEN SATURATION: 98 % | BODY MASS INDEX: 26.31 KG/M2 | HEIGHT: 62 IN | WEIGHT: 143 LBS | HEART RATE: 82 BPM

## 2023-10-30 DIAGNOSIS — M75.42 SUBACROMIAL IMPINGEMENT OF LEFT SHOULDER: Primary | ICD-10-CM

## 2023-10-30 DIAGNOSIS — M75.82 TENDINITIS OF LEFT ROTATOR CUFF: ICD-10-CM

## 2023-10-30 DIAGNOSIS — G89.29 CHRONIC LEFT SHOULDER PAIN: ICD-10-CM

## 2023-10-30 DIAGNOSIS — M25.512 CHRONIC LEFT SHOULDER PAIN: ICD-10-CM

## 2023-10-30 RX ORDER — LIDOCAINE HYDROCHLORIDE 10 MG/ML
4 INJECTION, SOLUTION EPIDURAL; INFILTRATION; INTRACAUDAL; PERINEURAL
Status: DISCONTINUED | OUTPATIENT
Start: 2023-10-30 | End: 2023-10-30 | Stop reason: HOSPADM

## 2023-10-30 RX ORDER — ESCITALOPRAM OXALATE 10 MG/1
10 TABLET ORAL DAILY
COMMUNITY

## 2023-10-30 RX ORDER — METHYLPREDNISOLONE ACETATE 40 MG/ML
40 INJECTION, SUSPENSION INTRA-ARTICULAR; INTRALESIONAL; INTRAMUSCULAR; SOFT TISSUE
Status: DISCONTINUED | OUTPATIENT
Start: 2023-10-30 | End: 2023-10-30 | Stop reason: HOSPADM

## 2023-10-30 RX ADMIN — METHYLPREDNISOLONE ACETATE 40 MG: 40 INJECTION, SUSPENSION INTRA-ARTICULAR; INTRALESIONAL; INTRAMUSCULAR; SOFT TISSUE at 10:00

## 2023-10-30 RX ADMIN — LIDOCAINE HYDROCHLORIDE 4 ML: 10 INJECTION, SOLUTION EPIDURAL; INFILTRATION; INTRACAUDAL; PERINEURAL at 10:00

## 2023-10-30 NOTE — PROGRESS NOTES
NEW VISIT    Patient: Rashmi Diana     YOB: 1980    MRN: [id number]     Chief Complaint   Patient presents with   • Left Shoulder - Initial Evaluation         HPI: Patient is a 43-year-old female who presents today for acute on chronic left shoulder pain.  She was previously seen in our office by Dr. Valencia 2021 for similar symptoms.  Also has a history of right rotator cuff injury treated conservatively.  Denies any acute injury.  States she has pain primarily over the lateral and anterior aspect of the left shoulder worse with overhead activity.  Occasional radiation laterally down to lateral elbow.  Occasional nonpainful popping.  Denies locking or catching.  Denies numbness or tingling.  Has maintained full range of motion, and denies any significant strength deficit.  Previously had corticosteroid injection for subacromial bursitis multiple years ago which significantly improved her symptoms.  Is currently doing some of her home exercises previously prescribed for her right shoulder.  No current regular medication.    Allergies: No Known Allergies    Past Medical History:   Diagnosis Date   • Anxiety    • Headache    • History of completed stroke 05/2021   • Hypoglycemia    • Migraine     cluster   • Neck pain    • Right shoulder pain    • Rotator cuff injury 02/01/2020    right   • Spastic colon      Past Surgical History:   Procedure Laterality Date   • SHOULDER ARTHROSCOPY Right 12/18/2020    Procedure: SHOULDER ARTHROSCOPY with capsular release;  Surgeon: Dylan Valencia MD;  Location: [facility] OR;  Service: Orthopedics;  Laterality: Right;   • SHOULDER ARTHROSCOPY W/ ROTATOR CUFF REPAIR Right 7/14/2020    Procedure: SHOULDER ARTHROSCOPY WITH ROTATOR CUFF REPAIR;  Surgeon: Dlyan Valencia MD;  Location: [facility] OR;  Service: Orthopedics;  Laterality: Right;   • SHOULDER SURGERY Right 12/18/2020    scope   • TONSILLECTOMY AND ADENOIDECTOMY     • WISDOM TOOTH  "EXTRACTION       Social History     Occupational History   • Not on file   Tobacco Use   • Smoking status: Never   • Smokeless tobacco: Never   Vaping Use   • Vaping Use: Never used   Substance and Sexual Activity   • Alcohol use: Not Currently     Comment: Social   • Drug use: Not Currently     Types: Marijuana   • Sexual activity: Defer      Social History     Social History Narrative   • Not on file     Family History   Problem Relation Age of Onset   • Cancer Mother    • Cancer Paternal Grandmother    • Diabetes Paternal Grandfather        Review of Systems  Constitutional: Negative.  Negative for fever.   Musculoskeletal: Positive for joint pain  Skin: Negative.  Negative for rash and wound.    Neurological: Negative for numbness.     Vitals:    10/30/23 0905   Pulse: 82   SpO2: 98%   Weight: 64.9 kg (143 lb)   Height: 157.5 cm (62\")        Physical Exam  Constitutional: Patient is oriented to person, place, and time. Appears well-developed and well-nourished.   Head: Normocephalic and atraumatic.   Pulmonary/Chest: Effort normal.   Musculoskeletal:   See detailed exam below   Neurological: Alert and oriented to person, place, and time. No sensory deficit. Coordination normal.   Skin: Skin is warm and dry. Capillary refill takes less than 2 seconds. No rash noted. No erythema.     The left shoulder is without obvious signs of acute bony deformity, swelling, erythema or ecchymosis. There is mild tenderness over the anterior joint line, bicipital groove and biceps tendon.  Tenderness laterally over rotator cuff insertion and subacromial bursa.  There is no tenderness at the AC joint. There is no tenderness at the SC joint. Active range of motion is full, with painful arc.  Passive range of motion is full,  with painful arc. Impingement signs are positive with Neer, Beck, and crossover tests. Instability tests are negative with anterior and posterior drawer, and sulcus test. Speeds and Yergason's tests are " negative. Richland's test is positive for pain. Tonja's test is positive for pain.  Rotator cuff strength is 5/5 and painful.  Does have 4/5 strength of contralateral right sided rotator cuff.  Scapular function is normal.  The neck and opposite shoulder are otherwise normal and stable.       Diagnostics:  xrays obtained today    Left Shoulder X-Ray  Indication: Pain  Views: AP Internal and External Rotation    Findings:  No fracture  No bony lesion  Normal soft tissues  Normal joint spaces    Assessment:  Diagnoses and all orders for this visit:    1. Subacromial impingement of left shoulder (Primary)    2. Tendinitis of left rotator cuff    3. Chronic left shoulder pain  -     XR Shoulder 2+ View Left        Plan    Subacromial corticosteroid injection discussed and given as noted below without complication.  Physical Therapy discussed.  Patient is currently doing home exercises from prior physical therapy for her right shoulder.  Discussed could revisit formal PT at subsequent visits pending symptom progression.  Activity modifications discussed and recommended.  Avoiding repetitive overhead activity or heavy lifting.  Rest, ice, compression, and elevation (RICE) therapy  History of prior stroke.  Discussed preference and over-the-counter Tylenol arthritis 500 mg to 1 g 3 times daily as needed  Follow up in 4 month(s) or sooner as needed    Large Joint Arthrocentesis: L subacromial bursa  Date/Time: 10/30/2023 10:00 AM  Consent given by: patient  Site marked: site marked  Timeout: Immediately prior to procedure a time out was called to verify the correct patient, procedure, equipment, support staff and site/side marked as required   Supporting Documentation  Indications: pain   Procedure Details  Location: shoulder - L subacromial bursa  Preparation: Patient was prepped and draped in the usual sterile fashion  Needle size: 25 G  Approach: posterior  Medications administered: 4 mL lidocaine PF 1% 1 %; 40 mg  methylPREDNISolone acetate 40 MG/ML  Patient tolerance: patient tolerated the procedure well with no immediate complications          Date of encounter: 10/30/2023   Micah Amado DO    Electronically signed by Micah Amado DO, 10/30/23, 9:19 AM EDT.    Disclaimer: Please note that areas of this note were completed with computer voice recognition software.  Quite often unanticipated grammatical, syntax, homophones, and other interpretive errors are inadvertently transcribed by the computer software. Please excuse any errors that have escaped final proofreading.

## 2023-11-03 ENCOUNTER — PATIENT ROUNDING (BHMG ONLY) (OUTPATIENT)
Dept: SPORTS MEDICINE | Facility: CLINIC | Age: 43
End: 2023-11-03
Payer: COMMERCIAL

## 2024-02-22 ENCOUNTER — OFFICE VISIT (OUTPATIENT)
Dept: SPORTS MEDICINE | Facility: CLINIC | Age: 44
End: 2024-02-22
Payer: COMMERCIAL

## 2024-02-22 VITALS — HEIGHT: 62 IN | BODY MASS INDEX: 26.31 KG/M2 | OXYGEN SATURATION: 97 % | WEIGHT: 143 LBS

## 2024-02-22 DIAGNOSIS — M25.512 CHRONIC LEFT SHOULDER PAIN: ICD-10-CM

## 2024-02-22 DIAGNOSIS — G89.29 CHRONIC NECK PAIN: ICD-10-CM

## 2024-02-22 DIAGNOSIS — M75.82 TENDINITIS OF LEFT ROTATOR CUFF: ICD-10-CM

## 2024-02-22 DIAGNOSIS — M54.2 CHRONIC NECK PAIN: ICD-10-CM

## 2024-02-22 DIAGNOSIS — G89.29 CHRONIC LEFT SHOULDER PAIN: ICD-10-CM

## 2024-02-22 DIAGNOSIS — M75.42 SUBACROMIAL IMPINGEMENT OF LEFT SHOULDER: Primary | ICD-10-CM

## 2024-02-22 RX ORDER — LIDOCAINE HYDROCHLORIDE 10 MG/ML
4 INJECTION, SOLUTION EPIDURAL; INFILTRATION; INTRACAUDAL; PERINEURAL
Status: DISCONTINUED | OUTPATIENT
Start: 2024-02-22 | End: 2024-02-22 | Stop reason: HOSPADM

## 2024-02-22 RX ORDER — METHYLPREDNISOLONE ACETATE 40 MG/ML
40 INJECTION, SUSPENSION INTRA-ARTICULAR; INTRALESIONAL; INTRAMUSCULAR; SOFT TISSUE
Status: DISCONTINUED | OUTPATIENT
Start: 2024-02-22 | End: 2024-02-22 | Stop reason: HOSPADM

## 2024-02-22 RX ORDER — FLUTICASONE PROPIONATE AND SALMETEROL 100; 50 UG/1; UG/1
POWDER RESPIRATORY (INHALATION)
COMMUNITY

## 2024-02-22 RX ADMIN — METHYLPREDNISOLONE ACETATE 40 MG: 40 INJECTION, SUSPENSION INTRA-ARTICULAR; INTRALESIONAL; INTRAMUSCULAR; SOFT TISSUE at 10:07

## 2024-02-22 RX ADMIN — LIDOCAINE HYDROCHLORIDE 4 ML: 10 INJECTION, SOLUTION EPIDURAL; INFILTRATION; INTRACAUDAL; PERINEURAL at 10:07

## 2024-02-22 NOTE — PROGRESS NOTES
FOLLOW UP VISIT    Patient: Rashmi Diana     YOB: 1980    MRN: [id number]     Chief Complaint   Patient presents with   • Left Shoulder - Follow-up         HPI: Patient returning for follow-up of left shoulder pain, as well as new reported neck pain/stiffness.  Most recently had subacromial corticosteroid injection on 10/30/2023 which gave her significant near complete relief of her shoulder symptoms.  Only starting 3 weeks ago she had return in her shoulder symptoms, which her back to a similar level prior to injection.  Again pain over lateral and anterior aspect of the shoulder worse with overhead activity.  Has noticed new symptoms of stiffness and radiation into lateral and midline left-sided neck.  She is reporting stiffness and decreased motion regarding the neck.  Also occasional crepitus with her neck range of motion.  History of right-sided stroke without deficits patient states stemmed from carotid involvement secondary to chiropractor manipulation.  As such she is concerned about neck symptoms due to this past history.  She denies any numbness or tingling.  Denies any upper extremity weakness.  Is continuing to ice and take over-the-counter Tylenol as needed.    Allergies: No Known Allergies    Past Medical History:   Diagnosis Date   • Anxiety    • Headache    • History of completed stroke 05/2021   • Hypoglycemia    • Migraine     cluster   • Neck pain    • Right shoulder pain    • Rotator cuff injury 02/01/2020    right   • Spastic colon      Past Surgical History:   Procedure Laterality Date   • SHOULDER ARTHROSCOPY Right 12/18/2020    Procedure: SHOULDER ARTHROSCOPY with capsular release;  Surgeon: Dylan Valencia MD;  Location: [facility] OR;  Service: Orthopedics;  Laterality: Right;   • SHOULDER ARTHROSCOPY W/ ROTATOR CUFF REPAIR Right 7/14/2020    Procedure: SHOULDER ARTHROSCOPY WITH ROTATOR CUFF REPAIR;  Surgeon: Dylan Valencia MD;  Location: [facility] OR;  " Service: Orthopedics;  Laterality: Right;   • SHOULDER SURGERY Right 12/18/2020    scope   • TONSILLECTOMY AND ADENOIDECTOMY     • WISDOM TOOTH EXTRACTION       Social History     Occupational History   • Not on file   Tobacco Use   • Smoking status: Never   • Smokeless tobacco: Never   Vaping Use   • Vaping Use: Never used   Substance and Sexual Activity   • Alcohol use: Not Currently     Comment: Social   • Drug use: Not Currently     Types: Marijuana   • Sexual activity: Defer      Social History     Social History Narrative   • Not on file     Family History   Problem Relation Age of Onset   • Cancer Mother    • Cancer Paternal Grandmother    • Diabetes Paternal Grandfather        Review of Systems  Constitutional: Negative.  Negative for fever.   Musculoskeletal: Positive for joint pain  Skin: Negative.  Negative for rash and wound.    Neurological: Negative for numbness.     Vitals:    02/22/24 0832   SpO2: 97%   Weight: 64.9 kg (143 lb)   Height: 157.5 cm (62\")        Physical Exam  Constitutional: Patient is oriented to person, place, and time. Appears well-developed and well-nourished.   Head: Normocephalic and atraumatic.   Pulmonary/Chest: Effort normal.   Musculoskeletal:   See detailed exam below   Neurological: Alert and oriented to person, place, and time. No sensory deficit. Coordination normal.   Skin: Skin is warm and dry. Capillary refill takes less than 2 seconds. No rash noted. No erythema.     The left shoulder is without obvious signs of acute bony deformity, swelling, erythema or ecchymosis. There is mild tenderness over the anterior joint line, bicipital groove and biceps tendon.  Tenderness laterally over rotator cuff insertion and subacromial bursa.  There is no tenderness at the AC joint. There is no tenderness at the SC joint. Active range of motion is full, with painful arc.  Passive range of motion is full,  with painful arc. Impingement signs are positive with Neer Beck, and " crossover tests. Instability tests are negative with anterior and posterior drawer, and sulcus test. Speeds and Yergason's tests are negative. [place]'s test is positive for pain. Tonja's test is positive for pain.  Rotator cuff strength is 5/5 and painful.  Does have 4/5 strength of contralateral right sided rotator cuff.  Scapular function is normal.  The neck and opposite shoulder are otherwise normal and stable.       Diagnostics:  no diagnostic testing performed this visit    XR Shoulder 2+ View Left    Result Date: 10/30/2023  Impression: No acute osseous abnormality. Electronically Signed: Gilson Haro MD  10/30/2023 1:46 PM EDT  Workstation ID: [id number]     Assessment:  Diagnoses and all orders for this visit:    1. Subacromial impingement of left shoulder (Primary)  -     Ambulatory Referral to Physical Therapy Evaluate and treat; Stretching, ROM, Strengthening    2. Tendinitis of left rotator cuff  -     Ambulatory Referral to Physical Therapy Evaluate and treat; Stretching, ROM, Strengthening    3. Chronic left shoulder pain  -     Ambulatory Referral to Physical Therapy Evaluate and treat; Stretching, ROM, Strengthening    4. Chronic neck pain  -     Ambulatory Referral to Physical Therapy Evaluate and treat; Stretching, ROM, Strengthening  -     Ambulatory Referral to Neurosurgery          Plan    Patient returns today for follow-up for left shoulder pain, as well as new neck crepitus/stiffness.  She had substantial relief from most recent corticosteroid injection which wore off over the past 3 weeks and is back to baseline.  She would like to repeat injection today which was given as noted below without complication.  She does continue to have impingement signs, as well as pain with rotator cuff testing although without significant strength deficits.  She also has significant hypertonicity of the paraspinal musculature, as well as levator and trapezius left-sided.  As such I do feel she would benefit  from formal physical therapy to address both shoulder stability as well as neck hypertonicity and decreased range of motion.  Discussed this could benefit both neck and shoulder symptoms.  We will plan on follow-up in 3 to 4 months to monitor progress with ongoing physical therapy and repeat subacromial corticosteroid injection.  Will also refer her to spine for further workup of cervical spine.  If continued shoulder symptoms at follow-up visit, will likely obtain MRI to further evaluate left-sided rotator cuff.  Terry subacromial corticosteroid injection given as noted below without complication.  Physical therapy referral placed as noted above.  Activity modifications discussed and recommended.  Avoiding repetitive overhead activity or heavy lifting.  Rest, ice, compression, and elevation (RICE) therapy  History of prior stroke.  Continue over-the-counter Tylenol arthritis 1 g times daily as needed.  Follow-up with neurosurgery as discussed above  Follow up for the shoulder in 3 to 4 months or sooner as needed.    Large Joint Arthrocentesis: L subacromial bursa  Date/Time: 2/22/2024 10:07 AM  Consent given by: patient  Site marked: site marked  Timeout: Immediately prior to procedure a time out was called to verify the correct patient, procedure, equipment, support staff and site/side marked as required   Supporting Documentation  Indications: pain   Procedure Details  Location: shoulder - L subacromial bursa  Preparation: Patient was prepped and draped in the usual sterile fashion  Needle size: 25 G  Approach: posterior  Medications administered: 4 mL lidocaine PF 1% 1 %; 40 mg methylPREDNISolone acetate 40 MG/ML  Patient tolerance: patient tolerated the procedure well with no immediate complications          Date of encounter: 2/22/2024  Micah Amado DO      Disclaimer: Please note that areas of this note were completed with computer voice recognition software.  Quite often unanticipated grammatical, syntax,  homophones, and other interpretive errors are inadvertently transcribed by the computer software. Please excuse any errors that have escaped final proofreading.

## 2024-02-27 NOTE — PROGRESS NOTES
"Subjective   History of Present Illness: Rashmi Diana is a 43 y.o. female is being seen for consultation today at the request of Micah Amado DO for neck pain.  She has long history of left shoulder pain and neck pain.  She was seen with orthopedics felt patient did likely have some left shoulder pathology but wanted to rule out any cervical pathology responsible for her symptoms.  Patient reports a chronic history of neck and shoulder pain mainly on her left with radiation down into her elbow that waxes and wanes in intensity.  She describes associated neck stiffness and \"gravel feeling \"in her neck.  Her pain is described as a dull ache.  She feels her left arm is weak.  She does not report any acute paresthesias.  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  She does follow with orthopedics for her shoulder symptoms and recently underwent an injection in her left shoulder.  She describes no bowel or bladder issues or gait or balance issues.  She does report using marijuana for her pain control with good results.        History of Present Illness    The following portions of the patient's history were reviewed and updated as appropriate: allergies, current medications, past family history, past medical history, past social history, past surgical history, and problem list.    Review of Systems   Constitutional:  Positive for activity change.   HENT: Negative.     Eyes: Negative.    Respiratory: Negative.     Cardiovascular: Negative.    Gastrointestinal: Negative.    Endocrine: Negative.    Genitourinary: Negative.    Musculoskeletal:  Positive for arthralgias, myalgias, neck pain (bilateral shoulders) and neck stiffness.   Skin: Negative.    Allergic/Immunologic: Negative.    Neurological:  Positive for weakness (left arm) and numbness (left arm).   Psychiatric/Behavioral:  Positive for sleep disturbance.    All other " "systems reviewed and are negative.      Objective     ./81   Pulse 69   Ht 157.5 cm (62\")   Wt 67.9 kg (149 lb 12.8 oz)   BMI 27.40 kg/m²    Body mass index is 27.4 kg/m².    Vitals:    02/28/24 0934   PainSc:   6          Physical Exam  Neurologic Exam  Bilateral upper motor strength 5/5  Positive left Chaka  Left bicep, tricep reflex 3+, left brachioradialis 2+  3+ patellar reflexes  Mild tenderness over rotator cuff insertion consistent acromial bursa.  Full active range of motion with some slight eported pain  Tightness along paraspinal and trapezius musculature left over right    Assessment & Plan   Independent Review of Radiographic Studies:      I personally reviewed the images from the following studies.    Shoulder x-rays 10/30/2023    No acute osseous abnormality    Medical Decision Making:      Rashmi Layton a 43 y.o. female that is presenting to clinic today for history of neck pain and left shoulder and upper arm pain.  Patient likely with some intrinsic shoulder pathology but I do believe patient has component of cervical pathology responsible for her symptoms.  Her radicular pattern is along the C5 dermatome.  Patient had no motor strength issues, she did have objective upper motor neuron findings on her exam.  I do feel patient is suffering from some myeloradiculopathy and patient will need advanced imaging to rule out any cord compression.    Advanced imaging (i.e. MRI, CT scan, or CT myelogram) was ordered today during your office visit. Once this order is approved by insurance, our office will call you ASAP in regards to appointment details for your test. If there is a waiting time on this, it is because we are waiting on approval from your insurance.     Please schedule a follow-up appointment to discuss your test results in the office.    For the fastest turn around time for your advanced imaging to be reviewed by a provider and uploaded into our system, pick a [facility] " facility.     If you choose non-[facility] facility, you will be responsible for bringing the images on a CD to your follow-up appointment. If you forget the CD at the time of your appointment, you may be asked to reschedule.     Patient agrees to plan of care and wishes to proceed.  I encouraged her to call the office in the interim for any questions or concerns.      Diagnoses and all orders for this visit:    1. Myelopathy (Primary)  -     MRI Cervical Spine Without Contrast; Future  -     XR spine cervical ap and lat w flex and ext; Future    2. Myeloradiculopathy    Other orders  -     methylPREDNISolone (MEDROL) 4 MG dose pack; Take as directed on package instructions.  Dispense: 1 each; Refill: 0      Return for Recheck.    This patient was examined wearing appropriate personal protective equipment.     Rashmi Diana  reports that she has never smoked. She has never used smokeless tobacco..      Body mass index is 27.4 kg/m².         Patient's blood pressure was reviewed.  Recommendations for  a low-salt diet and exercise to maintain/improve BP in addition to taking any presribed medications.             Nitza Barron, APRN  02/28/24  10:36 EST

## 2024-02-28 ENCOUNTER — OFFICE VISIT (OUTPATIENT)
Dept: NEUROSURGERY | Facility: CLINIC | Age: 44
End: 2024-02-28
Payer: COMMERCIAL

## 2024-02-28 ENCOUNTER — PATIENT ROUNDING (BHMG ONLY) (OUTPATIENT)
Dept: NEUROSURGERY | Facility: CLINIC | Age: 44
End: 2024-02-28
Payer: COMMERCIAL

## 2024-02-28 VITALS
BODY MASS INDEX: 27.57 KG/M2 | HEIGHT: 62 IN | WEIGHT: 149.8 LBS | SYSTOLIC BLOOD PRESSURE: 121 MMHG | DIASTOLIC BLOOD PRESSURE: 81 MMHG | HEART RATE: 69 BPM

## 2024-02-28 DIAGNOSIS — G95.9 MYELOPATHY: Primary | ICD-10-CM

## 2024-02-28 DIAGNOSIS — G54.9 MYELORADICULOPATHY: ICD-10-CM

## 2024-02-28 PROCEDURE — 99204 OFFICE O/P NEW MOD 45 MIN: CPT | Performed by: NURSE PRACTITIONER

## 2024-02-28 RX ORDER — METHYLPREDNISOLONE 4 MG/1
TABLET ORAL
Qty: 1 EACH | Refills: 0 | Status: SHIPPED | OUTPATIENT
Start: 2024-02-28

## 2024-03-01 ENCOUNTER — TREATMENT (OUTPATIENT)
Dept: PHYSICAL THERAPY | Facility: CLINIC | Age: 44
End: 2024-03-01
Payer: COMMERCIAL

## 2024-03-01 DIAGNOSIS — M43.6 STIFFNESS OF CERVICAL SPINE: ICD-10-CM

## 2024-03-01 DIAGNOSIS — M25.512 ACUTE PAIN OF LEFT SHOULDER: ICD-10-CM

## 2024-03-01 DIAGNOSIS — M79.12 MYALGIA OF AUXILIARY MUSCLES, HEAD AND NECK: ICD-10-CM

## 2024-03-01 DIAGNOSIS — R20.0 NUMBNESS AND TINGLING IN LEFT ARM: ICD-10-CM

## 2024-03-01 DIAGNOSIS — M67.912 DISORDER OF LEFT ROTATOR CUFF: Primary | ICD-10-CM

## 2024-03-01 DIAGNOSIS — R20.2 NUMBNESS AND TINGLING IN LEFT ARM: ICD-10-CM

## 2024-03-01 DIAGNOSIS — M75.52 BURSITIS OF LEFT SHOULDER: ICD-10-CM

## 2024-03-01 NOTE — PROGRESS NOTES
Physical Therapy Initial Evaluation and Plan of Care                           [address] [place] [address] [address], [address], [place]  [address]    Patient: Rashmi Diana   : 1980  Diagnosis/ICD-10 Code:  Disorder of left rotator cuff [M67.912]  Referring practitioner: Micah Amado DO  Date of Initial Visit: 3/1/2024  Today's Date: 3/1/2024  Patient seen for 1 sessions           Subjective Questionnaire: NDI:18% impairment; QuickDASH: 20 = 20% impairment      Subjective Evaluation    History of Present Illness  Mechanism of injury: Laxmi reports she is having some L shoulder and neck pain & stiffness. She has had 2 steroid injections in L shoulder, most recently on . She notes initially after inj she had a lot of pain and swelling but since then it's calmed down. She had a massage which helped. She notes lifelong hx of bursitis in L shoulder. She has hx of R RTC repair. L shoulder got severe in 2023.   She notes when driving she avoids using L arm which is not typical for her.   Laxmi reports she saw Nitza Barron who ordered MRI for cervical spine.   Agg: carrying, reaching up, doing hair      Patient Occupation:  for medicaid waiver, a lot of computer work & driving Hand dominance: ambidextrous (R for writing)    Treatments  Current treatment: physical therapy  Patient Goals  Patient goals for therapy: decreased pain, increased motion, increased strength, independence with ADLs/IADLs and return to sport/leisure activities           Objective          Postural Observations  Seated posture: good  Standing posture: good      Palpation   Left   Hypertonic in the levator scapulae, middle trapezius, rhomboids and upper trapezius.   Trigger point to upper trapezius.     Right   Hypertonic in the levator scapulae, middle trapezius, rhomboids and upper trapezius.   Trigger point to upper trapezius.     Cervical/Thoracic Screen   Cervical range of motion within normal limits with the following exceptions:  Cervical rotation L 48 deg, R 55 deg with pain     Active Range of Motion   Left Shoulder   Flexion: 165 degrees with pain  External rotation 0°: 40 degrees   External rotation BTH: T4   Internal rotation BTB: T7     Right Shoulder   Normal active range of motion  External rotation 0°: 0 degrees     Joint Play   Left Shoulder  Hypomobile in the cervical spine and thoracic spine.    Right Shoulder  Hypomobile in the cervical spine and thoracic spine.     Strength/Myotome Testing     Left Shoulder     Planes of Motion   Flexion: 4     Isolated Muscles   Biceps: 5     Right Shoulder     Planes of Motion   Flexion: 4     Isolated Muscles   Biceps: 5     Tests     Left Shoulder   Positive empty can, Hawkin's and painful arc.   Negative full can.     Right Shoulder   Negative empty can, full can and Hawkin's.           The patient is an appropriate candidate for physical therapy and will benefit from skilled patient intervention in order address the above impairments/limitations.  The plan of care, goals and treatment plan were discussed with the patient and the patient is agreeable to participating in patient services. HOME EXERCISE PROGRAM [[id number]]    Right Cervical Rotation SNAG -  Repeat 15 Repetitions, Perform 2 Times a Day    Thoracic Extension  -  Repeat 10 Repetitions, Hold 3 Seconds, Complete 1 Set, Perform 8 Times a Day    No Money with band (Shoulder external rotation with scapular retraction) -  Repeat 15 Repetitions, Perform 2 Times a Day        Assessment & Plan       Assessment  Impairments: abnormal muscle firing, abnormal muscle tone, abnormal or restricted ROM, activity intolerance, lacks appropriate home exercise program and pain with function   Functional limitations: carrying objects, lifting, sleeping, uncomfortable because of pain, moving in bed, sitting, reaching behind back and reaching overhead   Assessment details: Laxmi is a 42 yo female presenting to OP PT w/acute on chronic L shoulder & neck  pain/stiffness. She exhibits limited cervical AROM, limited L shoulder AROM, significant hypertonia & trigger points throughout neck & upper back. S/s are consistent with RTC impingement, likely small tears though will need to be confirmed by MRI. She has weekly massages, though cont to exhibit significant soft tissue stiffness. She drives a lot for work, though typically avoids holding steering wheel w/L hand d/t pain. Laxmi also notes numbness in LUE while sleeping (lies on back holding hands over belly) that wakes her. She has hx of R RTC repair & adhesive capsulitis and participated in extended PT to regain function and mobility.   The patient is an appropriate candidate for physical therapy and will benefit from skilled patient intervention in order address the above impairments/limitations.  The plan of care, goals and treatment plan were discussed with the patient and the patient is agreeable to participating in patient services.   Barriers to therapy: pt has very busy work schedule & lives ~1 hr from clinic    Goals  Plan Goals: Plan Goals: STG: to be achieved in 3 wks  1. Pt will demonstrate at least 60 deg cervical R & L rotation for safe driving for work.  2. Pt will demonstrate L shoulder strength improved to at least 4/5 for lifting items out of fridge.     LTG: to be achieved in 12 wks  1. Pt will report sleeping at least 7 hrs consecutively without waking d/t N/T.   2. Pt will improve QuickDash score from 20 (raw score); 20% impairment to no greater than 10% impairment to reflect improved activity tolerance and functional mobility.   3. Pt will improve L shoulder AROM to at least 150 deg elevation w/no pain for placing items on shelf at eye level e.g. hanging clothes up or items on eye level shelf.   4. Pt will demonstrate negative Left empty can test for hooking bra in back.  5. Pt will demonstrate L shoulder strength improved to 4+/5 for lifting heavy items e.g. wheel [place] for yard  work    Plan  Therapy options: will be seen for skilled therapy services  Planned modality interventions: cryotherapy, dry needling, high voltage pulsed current (pain management), TENS, thermotherapy (hydrocollator packs), ultrasound and traction  Planned therapy interventions: ADL retraining, balance/weight-bearing training, body mechanics training, fine motor coordination training, flexibility, functional ROM exercises, home exercise program, joint mobilization, manual therapy, motor coordination training, neuromuscular re-education, postural training, soft tissue mobilization, spinal/joint mobilization, strengthening, stretching, therapeutic activities and IADL retraining  Frequency: 2x week  Duration in weeks: 12  Treatment plan discussed with: patient        Timed:         Manual Therapy:    15     mins  47719;     Therapeutic Exercise:    15     mins  43670;     Neuromuscular Mallorie:        mins  66852;    Therapeutic Activity:          mins  25127;     Gait Training:           mins  14174;     Ultrasound:          mins  52832;    Self-care  ____ mins 66118  Tests & Measures              mins  60877      Un-Timed:  Electrical Stimulation:         mins  34852 (MC );  Dry Needling          mins self-pay 19419  Traction      20    mins 13513  Low Eval          mins  34971  Mod Eval          mins  22724  High Eval                        15    mins  99754  Canal repositioning ___  mins  36429        Timed Treatment:   30   mins   Total Treatment:     65   mins    PT SIGNATURE: Lenka Crawford PT, DPT, cert. DN  [place] license # [id number]  Electronically signed by Lenka Crawford PT, 03/01/24, 7:48 AM EST    Initial Certification  Certification Period: 3/1/2024 through 5/29/2024  I certify that the therapy services are furnished while this patient is under my care.  The services outlined above are required by this patient, and will be reviewed every 90 days.     PHYSICIAN: Micah Amado DO    NPI: [id number]                                        DATE: ______________________________________________________________________________________________     Please sign and return via fax to [phone]. Thank you, [facility].

## 2024-03-08 ENCOUNTER — TREATMENT (OUTPATIENT)
Dept: PHYSICAL THERAPY | Facility: CLINIC | Age: 44
End: 2024-03-08
Payer: COMMERCIAL

## 2024-03-08 DIAGNOSIS — M67.912 DISORDER OF LEFT ROTATOR CUFF: Primary | ICD-10-CM

## 2024-03-08 DIAGNOSIS — M43.6 STIFFNESS OF CERVICAL SPINE: ICD-10-CM

## 2024-03-08 DIAGNOSIS — R20.0 NUMBNESS AND TINGLING IN LEFT ARM: ICD-10-CM

## 2024-03-08 DIAGNOSIS — M25.512 ACUTE PAIN OF LEFT SHOULDER: ICD-10-CM

## 2024-03-08 DIAGNOSIS — M75.52 BURSITIS OF LEFT SHOULDER: ICD-10-CM

## 2024-03-08 DIAGNOSIS — R20.2 NUMBNESS AND TINGLING IN LEFT ARM: ICD-10-CM

## 2024-03-08 DIAGNOSIS — M79.12 MYALGIA OF AUXILIARY MUSCLES, HEAD AND NECK: ICD-10-CM

## 2024-03-08 NOTE — PROGRESS NOTES
Physical Therapy Daily Treatment Note      Patient: Rashmi Diana   : 1980  Diagnosis/ICD-10 Code:  Disorder of left rotator cuff [M67.912]   Problems Addressed this Visit          Musculoskeletal and Injuries    Bursitis of left shoulder     Other Visit Diagnoses       Disorder of left rotator cuff    -  Primary    Stiffness of cervical spine        Myalgia of auxiliary muscles, head and neck        Acute pain of left shoulder        Numbness and tingling in left arm              Diagnoses         Codes Comments    Disorder of left rotator cuff    -  Primary ICD-10-CM: M67.912  ICD-9-CM: 726.10     Stiffness of cervical spine     ICD-10-CM: M43.6  ICD-9-CM: 723.5     Myalgia of auxiliary muscles, head and neck     ICD-10-CM: M79.12  ICD-9-CM: 729.1     Acute pain of left shoulder     ICD-10-CM: M25.512  ICD-9-CM: 719.41     Numbness and tingling in left arm     ICD-10-CM: R20.0, R20.2  ICD-9-CM: 782.0     Bursitis of left shoulder     ICD-10-CM: M75.52  ICD-9-CM: 726.10            Referring practitioner: No ref. provider found  Date of Initial Visit: Type: THERAPY  Noted: 3/1/2024  Today's Date: 3/8/2024    VISIT#: 2    Subjective Patient reports having difficulty and instability with groom hair and feeling weak in L shoulder with mild aching in front of shoulder.       Objective     See Exercise, Manual, and Modality Logs for complete treatment.      Assessment/Plan Patient with mild upward tilt of scapula, responded well to manual interventions with decreased symptoms and tolerated all progressed base scpaular strengthening well with only reports of muscle fatigue. Patient will continue to benefit from improved base scapular strengthening for improved scapular mechanics with active shoulder scaption movement.   Goals  Plan Goals: Plan Goals: STG: to be achieved in 3 wks  1. Pt will demonstrate at least 60 deg cervical R & L rotation for safe driving for work.  2. Pt will demonstrate L shoulder strength  improved to at least 4/5 for lifting items out of fridge.      LTG: to be achieved in 12 wks  1. Pt will report sleeping at least 7 hrs consecutively without waking d/t N/T.   2. Pt will improve QuickDash score from 20 (raw score); 20% impairment to no greater than 10% impairment to reflect improved activity tolerance and functional mobility.   3. Pt will improve L shoulder AROM to at least 150 deg elevation w/no pain for placing items on shelf at eye level e.g. hanging clothes up or items on eye level shelf.   4. Pt will demonstrate negative Left empty can test for hooking bra in back.  5. Pt will demonstrate L shoulder strength improved to 4+/5 for lifting heavy items e.g. wheel [place] for yard work    Progress per Plan of Care and Progress strengthening /stabilization /functional activity         Timed:         Manual Therapy:    15     mins  81979;     Therapeutic Exercise:    15     mins  69591;     Neuromuscular Mallorie:    10    mins  99070;    Therapeutic Activity:     15     mins  12867;     Gait Training:           mins  68205;     Ultrasound:         mins  85198;    Ionto                                   mins   11749  Self Care                    _____  mins   38544  Canalith Repos                   mins  66953    Un-Timed:  Electrical Stimulation:         mins  35041 ( );  Dry Needling          mins self-pay   Traction         mins 09985    Timed Treatment:   55   mins   Total Treatment:     55   mins    Bulmaro Monique PTA  [place] License [id number]  Physical Therapist Assistant

## 2024-03-13 ENCOUNTER — TREATMENT (OUTPATIENT)
Dept: PHYSICAL THERAPY | Facility: CLINIC | Age: 44
End: 2024-03-13
Payer: COMMERCIAL

## 2024-03-13 DIAGNOSIS — M79.12 MYALGIA OF AUXILIARY MUSCLES, HEAD AND NECK: ICD-10-CM

## 2024-03-13 DIAGNOSIS — M75.52 BURSITIS OF LEFT SHOULDER: ICD-10-CM

## 2024-03-13 DIAGNOSIS — M43.6 STIFFNESS OF CERVICAL SPINE: ICD-10-CM

## 2024-03-13 DIAGNOSIS — M25.512 ACUTE PAIN OF LEFT SHOULDER: ICD-10-CM

## 2024-03-13 DIAGNOSIS — M67.912 DISORDER OF LEFT ROTATOR CUFF: Primary | ICD-10-CM

## 2024-03-13 DIAGNOSIS — R20.2 NUMBNESS AND TINGLING IN LEFT ARM: ICD-10-CM

## 2024-03-13 DIAGNOSIS — R20.0 NUMBNESS AND TINGLING IN LEFT ARM: ICD-10-CM

## 2024-03-13 NOTE — PROGRESS NOTES
Physical Therapy Daily Treatment Note  [address], [address], [place]  [address]    Patient: Rashmi Diana   : 1980  Diagnosis/ICD-10 Code:  Disorder of left rotator cuff [M67.912]   Problems Addressed this Visit          Musculoskeletal and Injuries    Bursitis of left shoulder     Other Visit Diagnoses       Disorder of left rotator cuff    -  Primary    Stiffness of cervical spine        Myalgia of auxiliary muscles, head and neck        Acute pain of left shoulder        Numbness and tingling in left arm              Diagnoses         Codes Comments    Disorder of left rotator cuff    -  Primary ICD-10-CM: M67.912  ICD-9-CM: 726.10     Stiffness of cervical spine     ICD-10-CM: M43.6  ICD-9-CM: 723.5     Myalgia of auxiliary muscles, head and neck     ICD-10-CM: M79.12  ICD-9-CM: 729.1     Acute pain of left shoulder     ICD-10-CM: M25.512  ICD-9-CM: 719.41     Numbness and tingling in left arm     ICD-10-CM: R20.0, R20.2  ICD-9-CM: 782.0     Bursitis of left shoulder     ICD-10-CM: M75.52  ICD-9-CM: 726.10           Referring practitioner: Micah Amado DO  Date of Initial Visit: Type: THERAPY  Noted: 3/1/2024  Today's Date: 3/13/2024    VISIT#: 3    Subjective   Laxmi reports her L shoulder is feeling better already. She remains compliant with HEP.    Objective     See Exercise, Manual, and Modality Logs for complete treatment.     Assessment/Plan  Laxmi is reporting improved tolerance to endurant activities like driving w/LUE. She cont to get regular massages. Significantly improved soft tissue tone today compared to IE.   Progress per Plan of Care         Timed:         Manual Therapy:    15     mins  54146;     Therapeutic Exercise:    15     mins  47680;     Neuromuscular Mallorie:    9    mins  28359;    Therapeutic Activity:     15     mins  00631;     Gait Training:           mins  46357;     Ultrasound:          mins  60980;    Ionto                                   mins   01295  Self  Care                            mins   24213  Tests & Measures              mins   12321  Dutch stim                    mins   78661    Un-Timed:  Canalith Repos                   mins  84758  Electrical Stimulation:         mins  04911 ( );  Dry Needling          mins 59062/40182  Traction     15     mins 92856  Low Eval          Mins  80656  Mod Eval          Mins  89020  High Eval                            Mins  04524  Re-Eval                               mins  06250    Timed Treatment:   54   mins   Total Treatment:     69   mins    Lenka Crawford, PT, DPT, cert. DN  Physical Therapist  [place] Lic # [id number]

## 2024-03-22 ENCOUNTER — TREATMENT (OUTPATIENT)
Dept: PHYSICAL THERAPY | Facility: CLINIC | Age: 44
End: 2024-03-22
Payer: COMMERCIAL

## 2024-03-22 DIAGNOSIS — M43.6 STIFFNESS OF CERVICAL SPINE: ICD-10-CM

## 2024-03-22 DIAGNOSIS — R20.0 NUMBNESS AND TINGLING IN LEFT ARM: ICD-10-CM

## 2024-03-22 DIAGNOSIS — M79.12 MYALGIA OF AUXILIARY MUSCLES, HEAD AND NECK: ICD-10-CM

## 2024-03-22 DIAGNOSIS — R20.2 NUMBNESS AND TINGLING IN LEFT ARM: ICD-10-CM

## 2024-03-22 DIAGNOSIS — M75.52 BURSITIS OF LEFT SHOULDER: ICD-10-CM

## 2024-03-22 DIAGNOSIS — M67.912 DISORDER OF LEFT ROTATOR CUFF: Primary | ICD-10-CM

## 2024-03-22 DIAGNOSIS — M25.512 ACUTE PAIN OF LEFT SHOULDER: ICD-10-CM

## 2024-03-22 NOTE — PROGRESS NOTES
Physical Therapy Daily Treatment Note      Patient: Rashmi Diana   : 1980  Diagnosis/ICD-10 Code:  Disorder of left rotator cuff [M67.912]   Problems Addressed this Visit          Musculoskeletal and Injuries    Bursitis of left shoulder     Other Visit Diagnoses       Disorder of left rotator cuff    -  Primary    Stiffness of cervical spine        Myalgia of auxiliary muscles, head and neck        Acute pain of left shoulder        Numbness and tingling in left arm              Diagnoses         Codes Comments    Disorder of left rotator cuff    -  Primary ICD-10-CM: M67.912  ICD-9-CM: 726.10     Stiffness of cervical spine     ICD-10-CM: M43.6  ICD-9-CM: 723.5     Myalgia of auxiliary muscles, head and neck     ICD-10-CM: M79.12  ICD-9-CM: 729.1     Acute pain of left shoulder     ICD-10-CM: M25.512  ICD-9-CM: 719.41     Numbness and tingling in left arm     ICD-10-CM: R20.0, R20.2  ICD-9-CM: 782.0     Bursitis of left shoulder     ICD-10-CM: M75.52  ICD-9-CM: 726.10            Referring practitioner: Micah Amado DO  Date of Initial Visit: Type: THERAPY  Noted: 3/1/2024  Today's Date: 3/22/2024    VISIT#: 4    Subjective Patient reports feeling better and has noticed improved tolerance to grooming hair and is pleased with current progress.       Objective added wall clock slides    See Exercise, Manual, and Modality Logs for complete treatment.     Assessment/Plan Patient demonstrating improved scapular/postural awareness and is demonstrating improved active motion with decreased pain reported. Patient making gradual and meaningful gains towards functional goals at this time.   Goals  Plan Goals: Plan Goals: STG: to be achieved in 3 wks  1. Pt will demonstrate at least 60 deg cervical R & L rotation for safe driving for work.  2. Pt will demonstrate L shoulder strength improved to at least 4/5 for lifting items out of fridge.      LTG: to be achieved in 12 wks  1. Pt will report sleeping at least 7  hrs consecutively without waking d/t N/T.   2. Pt will improve QuickDash score from 20 (raw score); 20% impairment to no greater than 10% impairment to reflect improved activity tolerance and functional mobility.   3. Pt will improve L shoulder AROM to at least 150 deg elevation w/no pain for placing items on shelf at eye level e.g. hanging clothes up or items on eye level shelf.   4. Pt will demonstrate negative Left empty can test for hooking bra in back.  5. Pt will demonstrate L shoulder strength improved to 4+/5 for lifting heavy items e.g. wheel [place] for yard work    Progress per Plan of Care and Progress strengthening /stabilization /functional activity         Timed:         Manual Therapy:    15     mins  51215;     Therapeutic Exercise:    15     mins  35648;     Neuromuscular Mallorie:    10    mins  33972;    Therapeutic Activity:     15     mins  81609;     Gait Training:           mins  54064;     Ultrasound:          mins  82804;    Ionto                                   mins   91060  Self Care                    _____  mins   07094  Canalith Repos                   mins  78362    Un-Timed:  Electrical Stimulation:         mins  94887 ( );  Dry Needling          mins self-pay   Traction          mins 92599    Timed Treatment:   55   mins   Total Treatment:     55   mins    Bulmaro Monique PTA  [place] License [id number]  Physical Therapist Assistant

## 2024-04-05 ENCOUNTER — TREATMENT (OUTPATIENT)
Dept: PHYSICAL THERAPY | Facility: CLINIC | Age: 44
End: 2024-04-05
Payer: COMMERCIAL

## 2024-04-05 DIAGNOSIS — R20.0 NUMBNESS AND TINGLING IN LEFT ARM: ICD-10-CM

## 2024-04-05 DIAGNOSIS — M75.52 BURSITIS OF LEFT SHOULDER: ICD-10-CM

## 2024-04-05 DIAGNOSIS — M25.512 ACUTE PAIN OF LEFT SHOULDER: ICD-10-CM

## 2024-04-05 DIAGNOSIS — M79.12 MYALGIA OF AUXILIARY MUSCLES, HEAD AND NECK: ICD-10-CM

## 2024-04-05 DIAGNOSIS — R20.2 NUMBNESS AND TINGLING IN LEFT ARM: ICD-10-CM

## 2024-04-05 DIAGNOSIS — M43.6 STIFFNESS OF CERVICAL SPINE: ICD-10-CM

## 2024-04-05 DIAGNOSIS — M67.912 DISORDER OF LEFT ROTATOR CUFF: Primary | ICD-10-CM

## 2024-04-05 NOTE — PROGRESS NOTES
Physical Therapy Daily Treatment Note      Patient: Rashmi Diana   : 1980  Diagnosis/ICD-10 Code:  Disorder of left rotator cuff [M67.912]   Problems Addressed this Visit          Musculoskeletal and Injuries    Bursitis of left shoulder     Other Visit Diagnoses       Disorder of left rotator cuff    -  Primary    Stiffness of cervical spine        Myalgia of auxiliary muscles, head and neck        Acute pain of left shoulder        Numbness and tingling in left arm              Diagnoses         Codes Comments    Disorder of left rotator cuff    -  Primary ICD-10-CM: M67.912  ICD-9-CM: 726.10     Stiffness of cervical spine     ICD-10-CM: M43.6  ICD-9-CM: 723.5     Myalgia of auxiliary muscles, head and neck     ICD-10-CM: M79.12  ICD-9-CM: 729.1     Acute pain of left shoulder     ICD-10-CM: M25.512  ICD-9-CM: 719.41     Numbness and tingling in left arm     ICD-10-CM: R20.0, R20.2  ICD-9-CM: 782.0     Bursitis of left shoulder     ICD-10-CM: M75.52  ICD-9-CM: 726.10            Referring practitioner: Micah Amado DO  Date of Initial Visit: Type: THERAPY  Noted: 3/1/2024  Today's Date: 2024    VISIT#: 5    Subjective Patient reports feeling a little stiff in neck shoulder, but has been very active and is pleased with progress.       Objective     See Exercise, Manual, and Modality Logs for complete treatment.     Assessment/Plan Patient with mild UT tightness noted upon arrival, responded well to manual interventions with decreased symptoms reported. Patient demonstrating improved scapular/postural awareness while performing base scapular strengthening and is progressing well towards functional goals at this time.     Goals  Plan Goals: Plan Goals: STG: to be achieved in 3 wks  1. Pt will demonstrate at least 60 deg cervical R & L rotation for safe driving for work. MET  2. Pt will demonstrate L shoulder strength improved to at least 4/5 for lifting items out of fridge.  MET     LTG: to be  achieved in 12 wks  1. Pt will report sleeping at least 7 hrs consecutively without waking d/t N/T.    2. Pt will improve QuickDash score from 20 (raw score); 20% impairment to no greater than 10% impairment to reflect improved activity tolerance and functional mobility.   3. Pt will improve L shoulder AROM to at least 150 deg elevation w/no pain for placing items on shelf at eye level e.g. hanging clothes up or items on eye level shelf. MET  4. Pt will demonstrate negative Left empty can test for hooking bra in back. MET  5. Pt will demonstrate L shoulder strength improved to 4+/5 for lifting heavy items e.g. wheel [place] for yard work  Anticipate DC next Visit         Timed:         Manual Therapy:    15     mins  77753;     Therapeutic Exercise:    15     mins  19970;     Neuromuscular Mallorie:   5     mins  36705;    Therapeutic Activity:     10     mins  89107;     Gait Training:           mins  48706;     Ultrasound:          mins  50069;    Ionto                                   mins   18424  Self Care                    _____  mins   80726  Canalith Repos                   mins  43502    Un-Timed:  Electrical Stimulation:         mins  74912 ( );  Dry Needling          mins self-pay   Traction          mins 74436    Timed Treatment:   45   mins   Total Treatment:     45   mins    Bulmaro Monique PTA  [place] License [id number]  Physical Therapist Assistant

## 2024-04-11 ENCOUNTER — TELEPHONE (OUTPATIENT)
Dept: PHYSICAL THERAPY | Facility: CLINIC | Age: 44
End: 2024-04-11

## 2024-05-23 ENCOUNTER — OFFICE VISIT (OUTPATIENT)
Dept: SPORTS MEDICINE | Facility: CLINIC | Age: 44
End: 2024-05-23
Payer: COMMERCIAL

## 2024-05-23 VITALS — WEIGHT: 149 LBS | HEIGHT: 62 IN | BODY MASS INDEX: 27.42 KG/M2 | OXYGEN SATURATION: 97 % | HEART RATE: 76 BPM

## 2024-05-23 DIAGNOSIS — G89.29 CHRONIC LEFT SHOULDER PAIN: ICD-10-CM

## 2024-05-23 DIAGNOSIS — G89.29 CHRONIC NECK PAIN: ICD-10-CM

## 2024-05-23 DIAGNOSIS — M75.82 TENDINITIS OF LEFT ROTATOR CUFF: ICD-10-CM

## 2024-05-23 DIAGNOSIS — M54.2 CHRONIC NECK PAIN: ICD-10-CM

## 2024-05-23 DIAGNOSIS — M25.512 CHRONIC LEFT SHOULDER PAIN: ICD-10-CM

## 2024-05-23 DIAGNOSIS — M75.42 SUBACROMIAL IMPINGEMENT OF LEFT SHOULDER: Primary | ICD-10-CM

## 2024-05-23 PROCEDURE — 99213 OFFICE O/P EST LOW 20 MIN: CPT | Performed by: STUDENT IN AN ORGANIZED HEALTH CARE EDUCATION/TRAINING PROGRAM

## 2024-05-23 RX ORDER — ALBUTEROL SULFATE 90 UG/1
2 AEROSOL, METERED RESPIRATORY (INHALATION)
COMMUNITY
Start: 2024-04-29

## 2024-05-23 NOTE — PROGRESS NOTES
FOLLOW UP VISIT    Patient: Rashmi Diana  ?  YOB: 1980    MRN: [id number]  ?  Chief Complaint   Patient presents with    Left Shoulder - Follow-up      ?  HPI: Patient returning for follow-up of left shoulder pain as well as associated left-sided neck pain.  She has recently completed full physical therapy course including both shoulder and neck rehabilitation.  She has had significant benefit, and is overall doing remarkably well at this time with minimal neck or shoulder symptoms, no limitation in daily activity.  She states specifically neck stretching has helped to increase cervical range of motion and decrease symptoms.  She did have interval follow-up with neurosurgery, with MRI pending at this time.  Has not taken any medication regularly for her left shoulder.  Overall pleased with current progress, and is continuing home exercise program after discharge from physical therapy.      Allergies: No Known Allergies    Past Medical History:   Diagnosis Date    Anxiety     Headache     History of completed stroke 05/2021    Hypoglycemia     Migraine     cluster    Neck pain     Right shoulder pain     Rotator cuff injury 02/01/2020    right    Spastic colon      Past Surgical History:   Procedure Laterality Date    SHOULDER ARTHROSCOPY Right 12/18/2020    Procedure: SHOULDER ARTHROSCOPY with capsular release;  Surgeon: Dylan Valencia MD;  Location: [facility] OR;  Service: Orthopedics;  Laterality: Right;    SHOULDER ARTHROSCOPY W/ ROTATOR CUFF REPAIR Right 7/14/2020    Procedure: SHOULDER ARTHROSCOPY WITH ROTATOR CUFF REPAIR;  Surgeon: Dylan Valencia MD;  Location: [facility];  Service: Orthopedics;  Laterality: Right;    SHOULDER SURGERY Right 12/18/2020    scope    TONSILLECTOMY AND ADENOIDECTOMY      WISDOM TOOTH EXTRACTION       Social History     Occupational History    Not on file   Tobacco Use    Smoking status: Never    Smokeless tobacco: Never   Vaping Use     "Vaping status: Never Used   Substance and Sexual Activity    Alcohol use: Not Currently     Comment: Social    Drug use: Not Currently     Types: Marijuana    Sexual activity: Defer      Social History     Social History Narrative    Not on file     Family History   Problem Relation Age of Onset    Cancer Mother     Cancer Paternal Grandmother     Diabetes Paternal Grandfather        Review of Systems  Constitutional: Negative.  Negative for fever.   Musculoskeletal: Positive for joint pain  Skin: Negative.  Negative for rash and wound.    Neurological: Negative for numbness.     Vitals:    05/23/24 0827   Pulse: 76   SpO2: 97%   Weight: 67.6 kg (149 lb)   Height: 157.5 cm (62\")        Physical Exam  Constitutional: Patient is oriented to person, place, and time. Appears well-developed and well-nourished.   Head: Normocephalic and atraumatic.   Pulmonary/Chest: Effort normal.   Musculoskeletal:   See detailed exam below   Neurological: Alert and oriented to person, place, and time. No sensory deficit. Coordination normal.   Skin: Skin is warm and dry. Capillary refill takes less than 2 seconds. No rash noted. No erythema.     The left shoulder is without obvious signs of acute bony deformity, swelling, erythema or ecchymosis.  There is mild tenderness over bicipital groove and biceps tendon.  Periscapular and upper trapezius tenderness/spasm much improved.  Mild tenderness laterally over rotator cuff insertion and subacromial bursa.  There is no tenderness at the AC joint. There is no tenderness at the SC joint. Active range of motion is full, without pain.  Passive range of motion is full without pain.  Impingement signs are negative with Neer, Beck, and crossover tests. Instability tests are negative with anterior and posterior drawer, and sulcus test. Speeds and Yergason's tests are negative. Calumet's test is negative for pain. Tonja's test is negative for pain.  Rotator cuff strength is 5/5 with mild " discomfort..  Scapular function is normal.  The neck and opposite shoulder are otherwise normal and stable.       Diagnostics:  no diagnostic testing performed this visit      Assessment:  Diagnoses and all orders for this visit:    1. Subacromial impingement of left shoulder (Primary)    2. Tendinitis of left rotator cuff    3. Chronic left shoulder pain    4. Chronic neck pain            Plan    Patient returning for follow-up of chronic left shoulder pain as well as associated neck/paraspinal pain.  Has completed full course of formal physical therapy, with ongoing home exercise program at this time.  Had substantial improvement with physical therapy course, and at this time is only having mild intermittent symptoms that are well-controlled with activity modification and occasional over-the-counter medication.  Is overall very pleased with progress at this time, and is not limited in any specific activities due to her left shoulder.  She does have cervical MRI planned through neurosurgery as well as follow-up after MRI obtained.  Encouraged her to complete and follow-up with neurosurgery as previously discussed.  At this time we will follow-up with our office as needed if new or worsening symptoms.  Continue home exercise program from physical therapy.  Rest, ice, compression, and elevation (RICE) therapy  Tylenol arthritis as needed.  Follow-up as needed if new or worsening symptoms      Date of encounter: 5/23/2024  Micah Amado DO      Disclaimer: Please note that areas of this note were completed with computer voice recognition software.  Quite often unanticipated grammatical, syntax, homophones, and other interpretive errors are inadvertently transcribed by the computer software. Please excuse any errors that have escaped final proofreading.

## 2024-07-11 ENCOUNTER — DOCUMENTATION (OUTPATIENT)
Dept: PHYSICAL THERAPY | Facility: CLINIC | Age: 44
End: 2024-07-11
Payer: COMMERCIAL

## 2024-07-11 NOTE — PROGRESS NOTES
Discharge Summary  Discharge Summary from Physical Therapy Report                               [address] [place], [address], [place]  [address]    Dates  PT visit: 3/1/2024- 4/5/2024       Number of Visits: 5     Discharge Status of Patient: See Progress Note dated No PN done, pt only seen for 5 sessions        Date of Discharge 7/11/24        Lenka Crawford, PT, DPT, cert. DN  Physical Therapist  [place] Lic# [id number]

## 2024-08-22 NOTE — PROGRESS NOTES
"Subjective   History of Present Illness: Rashmi Diana is a 44 y.o. female is here today for follow-up.  Was initially seen and evaluated by myself on 2/28/2024 for history of neck pain left shoulder and upper arm pain.  It was thought that she had some intrinsic shoulder pathology but was also thought she had a component of cervical radiculopathy responsible for her symptoms.   She was sent for advanced imaging and is here for review.   Patient reports no acute neurologic complaints.  She has started some turmeric and it has resolved her shoulder pain.  She still has mild neck pain that that is more of a nuisance to her than anything.      History of Present Illness    The following portions of the patient's history were reviewed and updated as appropriate: allergies, current medications, past family history, past medical history, past social history, past surgical history, and problem list.    Review of Systems   Constitutional:  Positive for activity change.   HENT: Negative.     Respiratory: Negative.     Cardiovascular: Negative.    Gastrointestinal: Negative.    Endocrine: Negative.    Genitourinary: Negative.    Musculoskeletal:  Positive for arthralgias, myalgias and neck pain (bilateral/arms).   Skin: Negative.    Allergic/Immunologic: Negative.    Neurological:  Positive for weakness (bilateral arms).   Hematological: Negative.    Psychiatric/Behavioral:  Positive for sleep disturbance.    All other systems reviewed and are negative.      Objective     ./77 (BP Location: Left arm, Patient Position: Sitting)   Pulse 79   Ht 157.5 cm (62\")   Wt 66 kg (145 lb 9.6 oz)   LMP 11/27/2020   SpO2 96%   BMI 26.63 kg/m²    Body mass index is 26.63 kg/m².    Vitals:    08/28/24 1548   PainSc:   3          Physical Exam  Neurologic Exam      Assessment & Plan   Independent Review of Radiographic Studies:      I personally reviewed the images from the following studies.    MRI cervical spine " 7/2/2024    Calcified disc protrusion at C5-C6 and C6-C7 with some mild canal narrowing.  Appears to be an annular fissure also superimposed at the C6-C7 level.  Mild to moderate foraminal narrowing right over left at C5-C6 and mild to moderate foraminal narrowing right over left at C6-C7.    Cervical flexion-extension imaging 7/2/2024    No translational instability with flexion or extension.  Mild degenerative disc changes and facet arthropathy noted.    Medical Decision Making:      Rashmi Dianais a 44 y.o. female following up on her neck and left arm pain and review of imaging.  Patient is imaging demonstrates some mild disc bulges with no compressive etiology.  She does have a annular tear at C6-C7 most likely responsible for her initial symptoms.  There is no instability on flexion-extension imaging.  Her symptoms are bettering and no neurosurgical intervention required at this time.  Patient should continue with her home exercise program and may even resume some dry needling.  I encouraged to call the office if her symptoms progress or worsen.  Patient agrees to plan of care and wishes to proceed.  I encouraged to call the office with questions or concerns.          Diagnoses and all orders for this visit:    1. DDD (degenerative disc disease), cervical (Primary)      Return if symptoms worsen or fail to improve.    This patient was examined wearing appropriate personal protective equipment.     Rashmi Diana  reports that she has never smoked. She has never used smokeless tobacco.      Body mass index is 26.63 kg/m².      Patient's blood pressure was reviewed.  Recommendations for  a low-salt diet and exercise to maintain/improve BP in addition to taking any presribed medications.             Nitza Barron, ELÍAS  08/28/24  16:36 EDT

## 2024-08-28 ENCOUNTER — OFFICE VISIT (OUTPATIENT)
Dept: NEUROSURGERY | Facility: CLINIC | Age: 44
End: 2024-08-28
Payer: COMMERCIAL

## 2024-08-28 VITALS
WEIGHT: 145.6 LBS | HEIGHT: 62 IN | HEART RATE: 79 BPM | OXYGEN SATURATION: 96 % | SYSTOLIC BLOOD PRESSURE: 111 MMHG | BODY MASS INDEX: 26.79 KG/M2 | DIASTOLIC BLOOD PRESSURE: 77 MMHG

## 2024-08-28 DIAGNOSIS — M50.30 DDD (DEGENERATIVE DISC DISEASE), CERVICAL: Primary | ICD-10-CM

## 2024-08-28 PROCEDURE — 99213 OFFICE O/P EST LOW 20 MIN: CPT | Performed by: NURSE PRACTITIONER

## 2024-09-09 ENCOUNTER — TRANSCRIBE ORDERS (OUTPATIENT)
Dept: PHYSICAL THERAPY | Facility: CLINIC | Age: 44
End: 2024-09-09
Payer: COMMERCIAL

## 2024-09-09 DIAGNOSIS — R42 VERTIGO: Primary | ICD-10-CM

## 2024-09-12 ENCOUNTER — TREATMENT (OUTPATIENT)
Dept: PHYSICAL THERAPY | Facility: CLINIC | Age: 44
End: 2024-09-12
Payer: COMMERCIAL

## 2024-09-12 DIAGNOSIS — M62.9 MUSCULOSKELETAL DISORDER INVOLVING UPPER TRAPEZIUS MUSCLE: ICD-10-CM

## 2024-09-12 DIAGNOSIS — M54.12 RADICULOPATHY, CERVICAL: Primary | ICD-10-CM

## 2024-09-12 DIAGNOSIS — M50.30 DDD (DEGENERATIVE DISC DISEASE), CERVICAL: Primary | ICD-10-CM

## 2024-09-12 NOTE — PROGRESS NOTES
Physical Therapy Initial Evaluation and Plan of Care  [address] [place] [address] [place] [address]    Patient: Rashmi Diana   : 1980  Diagnosis/ICD-10 Code:  Radiculopathy, cervical [M54.12]  Referring practitioner: Self Referring  Date of Initial Visit: 2024  Today's Date: 2024  Patient seen for 1 sessions           Subjective Questionnaire: PT Functional Test: NDI = 26% impairment      Subjective Evaluation    History of Present Illness  Mechanism of injury: Pt reports she had MRI of her neck which showed degenerative changes and a tear. She states this worried and stressed and this tensed up her R side. Has picked up on doing her exercises at home. Pt with hx R RCR, had limited progress and had another arthroscopic surgery for capsular release in . States she never gained full mobility back and has noticed that she is starting to compensate. Pt wanting to do dry needling to address neck tightness. Pt works as a  and drives and sits at her computer a lot. Having a lot of tightness and having difficulty turning her head to the R. Has twitches at side of shoulder blade when laying in bed at night. Had 2 episodes of tingling in R index and middle finger yesterday. Having HA that starts at base of skull. Has used ice and heat that both give temporary relief. Takes ibuprofen prn.      Patient Occupation:  of life: excellent    Pain  Current pain ratin  Location: R side of neck, medial and lateral R scap pain.  Quality: discomfort, tight, pressure and dull ache  Relieving factors: heat and ice  Aggravating factors: sleeping and outstretched reach  Progression: worsening    Social Support  Lives with: spouse and young children    Hand dominance: right    Diagnostic Tests  MRI studies: abnormal (see chart)    Patient Goals  Patient goals for therapy: decreased pain, increased motion and increased strength           Objective          Palpation   Left    Hypertonic in the suboccipitals.     Right   Hypertonic in the cervical paraspinals, levator scapulae, suboccipitals and upper trapezius.   Muscle spasm in the middle trapezius, rhomboids and teres major. Tenderness of the levator scapulae, rhomboids and upper trapezius.     Active Range of Motion   Cervical/Thoracic Spine   Cervical    Flexion: WFL  Extension: 43 degrees with pain  Left rotation: 25 degrees   Right rotation: 50 degrees with pain    Additional Active Range of Motion Details  R shoulder AROM lacking ~15-20% compared to L in all directions.  Hiking of L shoulder with UT activation during flexion and abuction.    Strength/Myotome Testing   Cervical Spine     Left   Normal strength    Right Shoulder     Planes of Motion   Flexion: 4 (at 90 deg)   Abduction: 4 (at 90 deg)   External rotation at 0°: 4+   Internal rotation at 0°: 5     Right Elbow   Flexion: 5  Extension: 5    Right Wrist/Hand   Wrist extension: 5  Wrist flexion: 4    Additional Strength Details  Strength of R shoulder flex and abd >90 deg = 3+/5    Tests   Cervical     Right   Positive cervical distraction.           Assessment & Plan       Assessment  Impairments: abnormal muscle tone, abnormal or restricted ROM, activity intolerance, impaired physical strength, lacks appropriate home exercise program and pain with function   Functional limitations: carrying objects, lifting, sleeping, pulling, pushing, uncomfortable because of pain, moving in bed, reaching behind back, reaching overhead and unable to perform repetitive tasks   Assessment details: Pt is 43 yo female with R sided neck pain and stiffness. She presents with decreased cervical ROM and increased tone of R cervical musculature, UT, and periscapular muscles. Pt is having difficulty turning her head when driving. Difficulty looking up. Difficulty with HA. NDI indicates 26% impairment.    Patient presents with the impairments listed above and based on the objective findings and  the physical therapy evaluation, the patient's condition has the potential to improve in response to therapy.   The patient's condition and/or services required are at a level of complexity that necessitates the skill & supervision of a physical therapist.    Prognosis: good    Goals  Plan Goals: STG to be met in 3 wk:  - Pt to report 25% improvement in R UT symptoms.  - Pt to demonstrate full R shoulder flex AROM with minimal to no hiking of R shoulder.  - Pt to demonstrated improved posture with min verbal cues.  LTG to be met in 12 wk:  - Improve NDI to 10% or less impairment.  - Improve R cervical rotation to 50 deg for improved ease turning her head when driving.  - Increase R shoulder flex and abd strength above 90 deg to 4/5 or better for improved overhead use of R UE without compensation.  - Pt to report 75% decrease in HA for improved QOL.    Plan  Therapy options: will be seen for skilled therapy services  Planned modality interventions: thermotherapy (hydrocollator packs), electrical stimulation/Russian stimulation, traction and dry needling  Planned therapy interventions: body mechanics training, flexibility, home exercise program, functional ROM exercises, manual therapy, postural training, soft tissue mobilization, strengthening, stretching and therapeutic activities  Frequency: 1x week  Duration in weeks: 12  Treatment plan discussed with: patient        Timed:         Manual Therapy:         mins  01315;     Therapeutic Exercise:         mins  82115;     Neuromuscular Mallorie:        mins  48112;    Therapeutic Activity:          mins  91201;     Gait Training:           mins  22123;     Ultrasound:          mins  80208;    Ionto                                   mins   83218  Self - Care                          mins  14241    Un-Timed:  Electrical Stimulation:         mins  47512 ( );  Dry Needling     25     20561/20560  Traction          mins 11044  Can Repos          mins 95760  Low Eval      30     Mins  43745  Mod Eval          Mins  75041  High Eval                            Mins  92745      Timed Treatment:   0   mins   Total Treatment:     55   mins      PT SIGNATURE: Minnie Kathleen PT, CLT  [place] Lic # [id number]  Electronically signed by Minnie Kathleen PT, 09/12/24, 8:36 AM EDT    Initial Certification  Certification Period: 9/12/2024 thru 12/10/2024  I certify that the therapy services are furnished while this patient is under my care.  The services outlined above are required by this patient, and will be reviewed every 90 days.     PHYSICIAN: Referring, Self _____________________________________________________  NPI:                                       DATE:    Please sign and return via fax to [phone]. Thank you, [facility].

## 2024-09-17 ENCOUNTER — TREATMENT (OUTPATIENT)
Dept: PHYSICAL THERAPY | Facility: CLINIC | Age: 44
End: 2024-09-17
Payer: COMMERCIAL

## 2024-09-17 DIAGNOSIS — M54.12 RADICULOPATHY, CERVICAL: Primary | ICD-10-CM

## 2024-09-17 DIAGNOSIS — M62.9 MUSCULOSKELETAL DISORDER INVOLVING UPPER TRAPEZIUS MUSCLE: ICD-10-CM

## 2024-09-24 ENCOUNTER — TREATMENT (OUTPATIENT)
Dept: PHYSICAL THERAPY | Facility: CLINIC | Age: 44
End: 2024-09-24
Payer: COMMERCIAL

## 2024-09-24 DIAGNOSIS — M54.12 RADICULOPATHY, CERVICAL: Primary | ICD-10-CM

## 2024-09-24 DIAGNOSIS — M62.9 MUSCULOSKELETAL DISORDER INVOLVING UPPER TRAPEZIUS MUSCLE: ICD-10-CM

## 2024-09-24 PROCEDURE — 97140 MANUAL THERAPY 1/> REGIONS: CPT | Performed by: PHYSICAL THERAPIST

## 2024-09-24 PROCEDURE — 97012 MECHANICAL TRACTION THERAPY: CPT | Performed by: PHYSICAL THERAPIST

## 2024-09-24 PROCEDURE — 20561 NDL INSJ W/O NJX 3+ MUSC: CPT | Performed by: PHYSICAL THERAPIST

## 2024-09-24 PROCEDURE — 97110 THERAPEUTIC EXERCISES: CPT | Performed by: PHYSICAL THERAPIST

## 2024-10-02 ENCOUNTER — TREATMENT (OUTPATIENT)
Dept: PHYSICAL THERAPY | Facility: CLINIC | Age: 44
End: 2024-10-02
Payer: COMMERCIAL

## 2024-10-02 DIAGNOSIS — M54.12 RADICULOPATHY, CERVICAL: Primary | ICD-10-CM

## 2024-10-02 DIAGNOSIS — M62.9 MUSCULOSKELETAL DISORDER INVOLVING UPPER TRAPEZIUS MUSCLE: ICD-10-CM

## 2024-10-02 NOTE — PROGRESS NOTES
Physical Therapy Daily Treatment Note      Patient: Rashmi Diana   : 1980  Diagnosis/ICD-10 Code:  Radiculopathy, cervical [M54.12]   Problems Addressed this Visit    None  Visit Diagnoses       Radiculopathy, cervical    -  Primary    Musculoskeletal disorder involving upper trapezius muscle              Diagnoses         Codes Comments    Radiculopathy, cervical    -  Primary ICD-10-CM: M54.12  ICD-9-CM: 723.4     Musculoskeletal disorder involving upper trapezius muscle     ICD-10-CM: M62.9  ICD-9-CM: 723.9           Referring practitioner: Self Referring  Date of Initial Visit: Type: THERAPY  Noted: 2024  Today's Date: 10/2/2024    VISIT#: 4    Subjective : Pt states R UE radicular symptoms are improving but still has numbness in R thumb, index, and middle fingers at times. She is able to shake arm and it gets better. Does not want to do traction today, does not feel like it helped much.    Objective : Had pt lay over neck cloud and she reported that it felt good. Pt plans to order one for home use.  Instructed pt to perform resisted B shoulder ER when sitting in her car between meeting. Pt demonstrated good understanding.    See Exercise, Manual, and Modality Logs for complete treatment.     Assessment/Plan : Pt responded well to dry needling and manual techniques with decreased R UT tension. Pt will benefit from use of neck cloud at home to manage neck pain and tightness.     Goals  Plan Goals: STG to be met in 3 wk:  - Pt to report 25% improvement in R UT symptoms. - Progressing  - Pt to demonstrate full R shoulder flex AROM with minimal to no hiking of R shoulder.  - Pt to demonstrated improved posture with min verbal cues.  LTG to be met in 12 wk:  - Improve NDI to 10% or less impairment.  - Improve R cervical rotation to 50 deg for improved ease turning her head when driving.  - Increase R shoulder flex and abd strength above 90 deg to 4/5 or better for improved overhead use of R UE  without compensation.  - Pt to report 75% decrease in HA for improved QOL.    Progress per Plan of Care and Progress strengthening /stabilization /functional activity      Timed:         Manual Therapy:    15     mins  04611;     Therapeutic Exercise:    10     mins  25043;     Neuromuscular Mallorie:        mins  00169;    Therapeutic Activity:          mins  21012;     Gait Training:           mins  47761;     Ultrasound:          mins  30835;    Ionto                                   mins   17938  Self Care                            mins   91635    Un-Timed:  Electrical Stimulation:         mins  67007 ( );  Dry Needling     15     mins 20561/20560  Traction          mins 84027  Canalith Repos                   mins  37375  Low Eval          Mins  89576  Mod Eval          Mins  09244  High Eval                            Mins  03178  Re-Eval                               mins  53503    Timed Treatment:   25   mins   Total Treatment:     40   mins    Minnie Kathleen, PT, CLT, Cert DN  Physical Therapist  [place] Lic # [id number]

## 2024-10-08 ENCOUNTER — TREATMENT (OUTPATIENT)
Dept: PHYSICAL THERAPY | Facility: CLINIC | Age: 44
End: 2024-10-08
Payer: COMMERCIAL

## 2024-10-08 DIAGNOSIS — M54.12 RADICULOPATHY, CERVICAL: Primary | ICD-10-CM

## 2024-10-08 DIAGNOSIS — M62.9 MUSCULOSKELETAL DISORDER INVOLVING UPPER TRAPEZIUS MUSCLE: ICD-10-CM

## 2024-10-08 NOTE — PROGRESS NOTES
Physical Therapy Daily Treatment Note      Patient: Rashmi Diana   : 1980  Diagnosis/ICD-10 Code:  Radiculopathy, cervical [M54.12]   Problems Addressed this Visit    None  Visit Diagnoses       Radiculopathy, cervical    -  Primary    Musculoskeletal disorder involving upper trapezius muscle              Diagnoses         Codes Comments    Radiculopathy, cervical    -  Primary ICD-10-CM: M54.12  ICD-9-CM: 723.4     Musculoskeletal disorder involving upper trapezius muscle     ICD-10-CM: M62.9  ICD-9-CM: 723.9           Referring practitioner: Self Referring  Date of Initial Visit: Type: THERAPY  Noted: 2024  Today's Date: 10/8/2024    VISIT#: 5    Subjective : Pt reports she is feeling better. States her N/T is almost gone. Is having a lot of popping in neck but it is not painful. States she got a neck cloud and has been using it at home. R deltoid pain is improving and now is mostly at front of shoulder.    Objective : Dry needling performed with written consent to the following areas: B suboccipitals, B cervical and upper thoracic paraspinals, B UT, R medial scap border, R levator scap insertions prox and distal, R deltoid (post, med, and ant), R SCM and proximal UT, R teres, and R deep radial.    See Exercise, Manual, and Modality Logs for complete treatment.     Assessment/Plan : Decreased neck and R UE pain. Pt responding well to dry needling and manual techniques. She has been utilizing neck device at home that promote natural cervical lordosis. Plan to continue with dry needling.    Progress per Plan of Care         Timed:         Manual Therapy:    15     mins  62956;     Therapeutic Exercise:         mins  69676;     Neuromuscular Mallorie:        mins  05723;    Therapeutic Activity:          mins  58369;     Gait Training:           mins  02449;     Ultrasound:          mins  25460;    Ionto                                   mins   28887  Self Care                            mins    74952    Un-Timed:  Electrical Stimulation:         mins  07735 ( );  Dry Needling     15     mins 20561/18512  Traction          mins 49017  Canalith Repos                   mins  29174  Low Eval          Mins  46365  Mod Eval          Mins  37522  High Eval                            Mins  61405  Re-Eval                               mins  29955    Timed Treatment:   15   mins   Total Treatment:     30   mins    Minnie Kathleen PT, CLT, Cert DN  Physical Therapist  [place] Lic # [id number]

## (undated) DEVICE — EXPRESSEW III SUTURE NEEDLE FOR USE WITH EXPRESSEW II OR III SUTURE PASSER: Brand: EXPRESSEW

## (undated) DEVICE — SOL IRRIG SOD CHL 0.9PCT 3000ML

## (undated) DEVICE — GLV SURG SIGNATURE ESSENTIAL PF LTX SZ8.5

## (undated) DEVICE — 3M™ STERI-STRIP™ REINFORCED ADHESIVE SKIN CLOSURES, R1547, 1/2 IN X 4 IN (12 MM X 100 MM), 6 STRIPS/ENVELOPE: Brand: 3M™ STERI-STRIP™

## (undated) DEVICE — TBG ARTHSCP PT W CONN/REDUC 8FT

## (undated) DEVICE — ABL ASP APOLLO RF XL 90D

## (undated) DEVICE — ADHS LIQ MASTISOL 2/3ML

## (undated) DEVICE — SUT 1/0 27 PDS II VIO MONO CT Z353H

## (undated) DEVICE — PK SHLDR ARTHROSCOPY 50

## (undated) DEVICE — PAD,ABDOMINAL,5"X9",STERILE,LF,1/PK: Brand: MEDLINE INDUSTRIES, INC.

## (undated) DEVICE — SPNG GZ AVANT 6PLY 4X4IN STRL PK/2

## (undated) DEVICE — GLV SURG SENSICARE PI ORTHO SZ8 LF STRL

## (undated) DEVICE — GLV SURG SIGNATURE ESSENTIAL PF LTX SZ8

## (undated) DEVICE — TBG ARTHSCP DUALWAVE

## (undated) DEVICE — BLD SHAVER EXCALIBUR CRV 4MM

## (undated) DEVICE — CANN TRPL DAM 7X7MM

## (undated) DEVICE — PLT RETENT AC PLS

## (undated) DEVICE — TBG ARTHSCP PUMP W CONN/REDUC 8FT

## (undated) DEVICE — DRSNG SURESITE WNDW 4X4.5

## (undated) DEVICE — PK PROC TURNOVER

## (undated) DEVICE — TUBING, SUCTION, 1/4" X 12', STRAIGHT: Brand: MEDLINE

## (undated) DEVICE — SUTURELASSO CRV 25D RT

## (undated) DEVICE — SKIN AFFIX SURG ADHESIVE 72/CS 0.55ML: Brand: MEDLINE

## (undated) DEVICE — GOWN,SLEEVE,STERILE,W/CSR WRAP,1/P: Brand: MEDLINE

## (undated) DEVICE — CANN PASSPORT BUTN 8MM 3CM

## (undated) DEVICE — SLV SCD CALF HEMOFORCE DVT THERP REPROC MD

## (undated) DEVICE — GLV SURG DERMASSURE GRN LF PF 8.5

## (undated) DEVICE — KT SURG TURNOVER 050

## (undated) DEVICE — CUFF SCD HEMOFORCE SEQ CALF STD MD

## (undated) DEVICE — DRSNG TELFA PAD NONADH STR 1S 3X8IN